# Patient Record
Sex: FEMALE | Race: BLACK OR AFRICAN AMERICAN | NOT HISPANIC OR LATINO | URBAN - METROPOLITAN AREA
[De-identification: names, ages, dates, MRNs, and addresses within clinical notes are randomized per-mention and may not be internally consistent; named-entity substitution may affect disease eponyms.]

---

## 2017-02-15 ENCOUNTER — EMERGENCY (EMERGENCY)
Facility: HOSPITAL | Age: 82
LOS: 0 days | Discharge: HOME | End: 2017-02-15
Attending: EMERGENCY MEDICINE
Payer: MEDICARE

## 2017-02-15 VITALS
OXYGEN SATURATION: 100 % | DIASTOLIC BLOOD PRESSURE: 74 MMHG | TEMPERATURE: 98 F | SYSTOLIC BLOOD PRESSURE: 120 MMHG | HEIGHT: 66 IN | HEART RATE: 53 BPM | WEIGHT: 126.1 LBS | RESPIRATION RATE: 16 BRPM

## 2017-02-15 VITALS
HEART RATE: 53 BPM | TEMPERATURE: 99 F | OXYGEN SATURATION: 96 % | SYSTOLIC BLOOD PRESSURE: 103 MMHG | RESPIRATION RATE: 17 BRPM | DIASTOLIC BLOOD PRESSURE: 60 MMHG

## 2017-02-15 DIAGNOSIS — M54.5 LOW BACK PAIN: ICD-10-CM

## 2017-02-15 DIAGNOSIS — Z79.82 LONG TERM (CURRENT) USE OF ASPIRIN: ICD-10-CM

## 2017-02-15 DIAGNOSIS — S70.01XA CONTUSION OF RIGHT HIP, INITIAL ENCOUNTER: ICD-10-CM

## 2017-02-15 DIAGNOSIS — G89.29 OTHER CHRONIC PAIN: ICD-10-CM
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
## 2017-02-15 DIAGNOSIS — Z96.659 PRESENCE OF UNSPECIFIED ARTIFICIAL KNEE JOINT: Chronic | ICD-10-CM

## 2017-02-15 DIAGNOSIS — Z95.0 PRESENCE OF CARDIAC PACEMAKER: Chronic | ICD-10-CM

## 2017-02-15 DIAGNOSIS — B02.9 ZOSTER WITHOUT COMPLICATIONS: ICD-10-CM

## 2017-02-15 DIAGNOSIS — E78.00 PURE HYPERCHOLESTEROLEMIA, UNSPECIFIED: ICD-10-CM

## 2017-02-15 DIAGNOSIS — M15.9 POLYOSTEOARTHRITIS, UNSPECIFIED: ICD-10-CM
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
## 2017-02-15 DIAGNOSIS — I10 ESSENTIAL (PRIMARY) HYPERTENSION: ICD-10-CM

## 2017-02-15 DIAGNOSIS — E03.9 HYPOTHYROIDISM, UNSPECIFIED: ICD-10-CM

## 2017-02-15 DIAGNOSIS — I48.91 UNSPECIFIED ATRIAL FIBRILLATION: ICD-10-CM

## 2017-02-15 LAB
ALBUMIN SERPL ELPH-MCNC: 3.7 G/DL — SIGNIFICANT CHANGE UP (ref 3.3–5)
ALP SERPL-CCNC: 65 U/L — SIGNIFICANT CHANGE UP (ref 40–120)
ALT FLD-CCNC: 25 U/L — SIGNIFICANT CHANGE UP (ref 12–78)
ANION GAP SERPL CALC-SCNC: 10 MMOL/L — SIGNIFICANT CHANGE UP (ref 5–17)
APTT BLD: 33.8 SEC — SIGNIFICANT CHANGE UP (ref 27.5–37.4)
AST SERPL-CCNC: 31 U/L — SIGNIFICANT CHANGE UP (ref 15–37)
BASOPHILS # BLD AUTO: 0 K/UL — SIGNIFICANT CHANGE UP (ref 0–0.2)
BASOPHILS NFR BLD AUTO: 0.3 % — SIGNIFICANT CHANGE UP (ref 0–2)
BILIRUB SERPL-MCNC: 2.1 MG/DL — HIGH (ref 0.2–1.2)
BUN SERPL-MCNC: 13 MG/DL — SIGNIFICANT CHANGE UP (ref 7–23)
CALCIUM SERPL-MCNC: 8.6 MG/DL — SIGNIFICANT CHANGE UP (ref 8.5–10.1)
CHLORIDE SERPL-SCNC: 105 MMOL/L — SIGNIFICANT CHANGE UP (ref 96–108)
CO2 SERPL-SCNC: 27 MMOL/L — SIGNIFICANT CHANGE UP (ref 22–31)
CREAT SERPL-MCNC: 0.56 MG/DL — SIGNIFICANT CHANGE UP (ref 0.5–1.3)
EOSINOPHIL # BLD AUTO: 0 K/UL — SIGNIFICANT CHANGE UP (ref 0–0.5)
EOSINOPHIL NFR BLD AUTO: 0.1 % — SIGNIFICANT CHANGE UP (ref 0–6)
GLUCOSE SERPL-MCNC: 85 MG/DL — SIGNIFICANT CHANGE UP (ref 70–99)
HCT VFR BLD CALC: 38.6 % — SIGNIFICANT CHANGE UP (ref 34.5–45)
HGB BLD-MCNC: 13.7 G/DL — SIGNIFICANT CHANGE UP (ref 11.5–15.5)
INR BLD: 1.13 RATIO — SIGNIFICANT CHANGE UP (ref 0.88–1.16)
LYMPHOCYTES # BLD AUTO: 0.8 K/UL — LOW (ref 1–3.3)
LYMPHOCYTES # BLD AUTO: 10 % — LOW (ref 13–44)
MCHC RBC-ENTMCNC: 33.4 PG — SIGNIFICANT CHANGE UP (ref 27–34)
MCHC RBC-ENTMCNC: 35.6 GM/DL — SIGNIFICANT CHANGE UP (ref 32–36)
MCV RBC AUTO: 94 FL — SIGNIFICANT CHANGE UP (ref 80–100)
MONOCYTES # BLD AUTO: 0.5 K/UL — SIGNIFICANT CHANGE UP (ref 0–0.9)
MONOCYTES NFR BLD AUTO: 5.8 % — SIGNIFICANT CHANGE UP (ref 2–14)
NEUTROPHILS # BLD AUTO: 6.8 K/UL — SIGNIFICANT CHANGE UP (ref 1.8–7.4)
NEUTROPHILS NFR BLD AUTO: 83.8 % — HIGH (ref 43–77)
PLATELET # BLD AUTO: 129 K/UL — LOW (ref 150–400)
POTASSIUM SERPL-MCNC: 3.7 MMOL/L — SIGNIFICANT CHANGE UP (ref 3.5–5.3)
POTASSIUM SERPL-SCNC: 3.7 MMOL/L — SIGNIFICANT CHANGE UP (ref 3.5–5.3)
PROT SERPL-MCNC: 7.2 GM/DL — SIGNIFICANT CHANGE UP (ref 6–8.3)
PROTHROM AB SERPL-ACNC: 12.7 SEC — SIGNIFICANT CHANGE UP (ref 10–13.1)
RBC # BLD: 4.11 M/UL — SIGNIFICANT CHANGE UP (ref 3.8–5.2)
RBC # FLD: 12.7 % — SIGNIFICANT CHANGE UP (ref 11–15)
SODIUM SERPL-SCNC: 142 MMOL/L — SIGNIFICANT CHANGE UP (ref 135–145)
WBC # BLD: 8.1 K/UL — SIGNIFICANT CHANGE UP (ref 3.8–10.5)
WBC # FLD AUTO: 8.1 K/UL — SIGNIFICANT CHANGE UP (ref 3.8–10.5)

## 2017-02-15 PROCEDURE — 73502 X-RAY EXAM HIP UNI 2-3 VIEWS: CPT | Mod: 26,RT

## 2017-02-15 PROCEDURE — 99284 EMERGENCY DEPT VISIT MOD MDM: CPT

## 2017-02-15 PROCEDURE — 72100 X-RAY EXAM L-S SPINE 2/3 VWS: CPT | Mod: 26

## 2017-02-15 RX ORDER — IBUPROFEN 200 MG
1 TABLET ORAL
Qty: 9 | Refills: 0 | OUTPATIENT
Start: 2017-02-15 | End: 2017-02-18

## 2017-02-15 NOTE — ED PROVIDER NOTE - PROGRESS NOTE DETAILS
pt is alert and oriented x 3 smiling, pt's daughter is notified about all test reports and sts she is coming to pick pt. Pt is able to bear weight with assists.

## 2017-02-15 NOTE — ED PROVIDER NOTE - CONSTITUTIONAL, MLM
normal... Well appearing, well nourished, awake, alert, oriented to person, place, time/situation and in no apparent distress. Speaking in clear full sentences appears very comfortable while sitting up in the stretcher in a bright light room

## 2017-02-15 NOTE — ED PROVIDER NOTE - OBJECTIVE STATEMENT
87 years old female by ems c/o right lower back pain right buttock pain 6/10 radiated to her right hip after she bend fore berg and left a pop. Pt sts she did not fall. Pt denies headache, dizziness, loc, neck pain, blurred visions, light sensitivities, focal/distal weakness or numbness, cough, sob, chest pain, nausea, vomiting, fever, chills, abd pain, dysuria or irregular bowel movements.

## 2017-02-15 NOTE — ED PROVIDER NOTE - PMH
Atrial fibrillation    Chronic Back Pain    Generalized Osteoarthritis    Herpes zoster without complication  2015  HTN (hypertension)    Hypercholesteremia    Hypothyroidism    Lumbar Vertebral Fracture

## 2017-03-06 ENCOUNTER — APPOINTMENT (OUTPATIENT)
Dept: ELECTROPHYSIOLOGY | Facility: CLINIC | Age: 82
End: 2017-03-06

## 2017-05-16 ENCOUNTER — EMERGENCY (EMERGENCY)
Facility: HOSPITAL | Age: 82
LOS: 0 days | Discharge: HOME | End: 2017-05-16
Attending: EMERGENCY MEDICINE
Payer: MEDICARE

## 2017-05-16 VITALS
HEART RATE: 60 BPM | RESPIRATION RATE: 18 BRPM | OXYGEN SATURATION: 98 % | SYSTOLIC BLOOD PRESSURE: 116 MMHG | TEMPERATURE: 98 F | DIASTOLIC BLOOD PRESSURE: 62 MMHG

## 2017-05-16 VITALS
HEART RATE: 54 BPM | WEIGHT: 130.07 LBS | OXYGEN SATURATION: 96 % | SYSTOLIC BLOOD PRESSURE: 100 MMHG | TEMPERATURE: 98 F | HEIGHT: 66 IN | DIASTOLIC BLOOD PRESSURE: 56 MMHG | RESPIRATION RATE: 17 BRPM

## 2017-05-16 DIAGNOSIS — Z95.0 PRESENCE OF CARDIAC PACEMAKER: Chronic | ICD-10-CM

## 2017-05-16 DIAGNOSIS — Z96.659 PRESENCE OF UNSPECIFIED ARTIFICIAL KNEE JOINT: Chronic | ICD-10-CM

## 2017-05-16 LAB
ALBUMIN SERPL ELPH-MCNC: 3.7 G/DL — SIGNIFICANT CHANGE UP (ref 3.3–5)
ALP SERPL-CCNC: 68 U/L — SIGNIFICANT CHANGE UP (ref 40–120)
ALT FLD-CCNC: 21 U/L — SIGNIFICANT CHANGE UP (ref 12–78)
ANION GAP SERPL CALC-SCNC: 11 MMOL/L — SIGNIFICANT CHANGE UP (ref 5–17)
APPEARANCE UR: CLEAR — SIGNIFICANT CHANGE UP
AST SERPL-CCNC: 27 U/L — SIGNIFICANT CHANGE UP (ref 15–37)
BACTERIA # UR AUTO: ABNORMAL
BASOPHILS # BLD AUTO: 0 K/UL — SIGNIFICANT CHANGE UP (ref 0–0.2)
BASOPHILS NFR BLD AUTO: 0.8 % — SIGNIFICANT CHANGE UP (ref 0–2)
BILIRUB SERPL-MCNC: 1.6 MG/DL — HIGH (ref 0.2–1.2)
BILIRUB UR-MCNC: NEGATIVE — SIGNIFICANT CHANGE UP
BUN SERPL-MCNC: 11 MG/DL — SIGNIFICANT CHANGE UP (ref 7–23)
CALCIUM SERPL-MCNC: 9.2 MG/DL — SIGNIFICANT CHANGE UP (ref 8.5–10.1)
CHLORIDE SERPL-SCNC: 101 MMOL/L — SIGNIFICANT CHANGE UP (ref 96–108)
CO2 SERPL-SCNC: 27 MMOL/L — SIGNIFICANT CHANGE UP (ref 22–31)
COLOR SPEC: YELLOW — SIGNIFICANT CHANGE UP
CREAT SERPL-MCNC: 0.69 MG/DL — SIGNIFICANT CHANGE UP (ref 0.5–1.3)
DIFF PNL FLD: NEGATIVE — SIGNIFICANT CHANGE UP
EOSINOPHIL # BLD AUTO: 0 K/UL — SIGNIFICANT CHANGE UP (ref 0–0.5)
EOSINOPHIL NFR BLD AUTO: 0.1 % — SIGNIFICANT CHANGE UP (ref 0–6)
EPI CELLS # UR: SIGNIFICANT CHANGE UP
GLUCOSE SERPL-MCNC: 91 MG/DL — SIGNIFICANT CHANGE UP (ref 70–99)
GLUCOSE UR QL: NEGATIVE MG/DL — SIGNIFICANT CHANGE UP
HCT VFR BLD CALC: 44.9 % — SIGNIFICANT CHANGE UP (ref 34.5–45)
HGB BLD-MCNC: 15 G/DL — SIGNIFICANT CHANGE UP (ref 11.5–15.5)
KETONES UR-MCNC: NEGATIVE — SIGNIFICANT CHANGE UP
LACTATE SERPL-SCNC: 1.5 MMOL/L — SIGNIFICANT CHANGE UP (ref 0.7–2)
LEUKOCYTE ESTERASE UR-ACNC: NEGATIVE — SIGNIFICANT CHANGE UP
LYMPHOCYTES # BLD AUTO: 0.8 K/UL — LOW (ref 1–3.3)
LYMPHOCYTES # BLD AUTO: 13.9 % — SIGNIFICANT CHANGE UP (ref 13–44)
MCHC RBC-ENTMCNC: 32.3 PG — SIGNIFICANT CHANGE UP (ref 27–34)
MCHC RBC-ENTMCNC: 33.5 GM/DL — SIGNIFICANT CHANGE UP (ref 32–36)
MCV RBC AUTO: 96.3 FL — SIGNIFICANT CHANGE UP (ref 80–100)
MONOCYTES # BLD AUTO: 0.3 K/UL — SIGNIFICANT CHANGE UP (ref 0–0.9)
MONOCYTES NFR BLD AUTO: 5.2 % — SIGNIFICANT CHANGE UP (ref 2–14)
NEUTROPHILS # BLD AUTO: 4.8 K/UL — SIGNIFICANT CHANGE UP (ref 1.8–7.4)
NEUTROPHILS NFR BLD AUTO: 80.1 % — HIGH (ref 43–77)
NITRITE UR-MCNC: NEGATIVE — SIGNIFICANT CHANGE UP
PH UR: 6.5 — SIGNIFICANT CHANGE UP (ref 5–8)
PLATELET # BLD AUTO: 127 K/UL — LOW (ref 150–400)
POTASSIUM SERPL-MCNC: 4.5 MMOL/L — SIGNIFICANT CHANGE UP (ref 3.5–5.3)
POTASSIUM SERPL-SCNC: 4.5 MMOL/L — SIGNIFICANT CHANGE UP (ref 3.5–5.3)
PROT SERPL-MCNC: 7.7 GM/DL — SIGNIFICANT CHANGE UP (ref 6–8.3)
PROT UR-MCNC: 15 MG/DL
RBC # BLD: 4.66 M/UL — SIGNIFICANT CHANGE UP (ref 3.8–5.2)
RBC # FLD: 12.9 % — SIGNIFICANT CHANGE UP (ref 11–15)
RBC CASTS # UR COMP ASSIST: SIGNIFICANT CHANGE UP /HPF (ref 0–4)
SODIUM SERPL-SCNC: 139 MMOL/L — SIGNIFICANT CHANGE UP (ref 135–145)
SP GR SPEC: 1.01 — SIGNIFICANT CHANGE UP (ref 1.01–1.02)
UROBILINOGEN FLD QL: NEGATIVE MG/DL — SIGNIFICANT CHANGE UP
WBC # BLD: 6 K/UL — SIGNIFICANT CHANGE UP (ref 3.8–10.5)
WBC # FLD AUTO: 6 K/UL — SIGNIFICANT CHANGE UP (ref 3.8–10.5)
WBC UR QL: SIGNIFICANT CHANGE UP

## 2017-05-16 PROCEDURE — 99284 EMERGENCY DEPT VISIT MOD MDM: CPT

## 2017-05-16 PROCEDURE — 74176 CT ABD & PELVIS W/O CONTRAST: CPT | Mod: 26

## 2017-05-16 RX ORDER — ACETAMINOPHEN 500 MG
2 TABLET ORAL
Qty: 40 | Refills: 0 | OUTPATIENT
Start: 2017-05-16 | End: 2017-05-21

## 2017-05-16 RX ORDER — ONDANSETRON 8 MG/1
4 TABLET, FILM COATED ORAL ONCE
Qty: 0 | Refills: 0 | Status: COMPLETED | OUTPATIENT
Start: 2017-05-16 | End: 2017-05-16

## 2017-05-16 RX ORDER — MORPHINE SULFATE 50 MG/1
2 CAPSULE, EXTENDED RELEASE ORAL ONCE
Qty: 0 | Refills: 0 | Status: DISCONTINUED | OUTPATIENT
Start: 2017-05-16 | End: 2017-05-16

## 2017-05-16 RX ORDER — TRAMADOL HYDROCHLORIDE 50 MG/1
1 TABLET ORAL
Qty: 12 | Refills: 0 | OUTPATIENT
Start: 2017-05-16 | End: 2017-05-19

## 2017-05-16 RX ORDER — METHOCARBAMOL 500 MG/1
1 TABLET, FILM COATED ORAL
Qty: 15 | Refills: 0 | OUTPATIENT
Start: 2017-05-16 | End: 2017-05-21

## 2017-05-16 RX ADMIN — MORPHINE SULFATE 2 MILLIGRAM(S): 50 CAPSULE, EXTENDED RELEASE ORAL at 18:06

## 2017-05-16 RX ADMIN — ONDANSETRON 4 MILLIGRAM(S): 8 TABLET, FILM COATED ORAL at 18:06

## 2017-05-16 RX ADMIN — MORPHINE SULFATE 2 MILLIGRAM(S): 50 CAPSULE, EXTENDED RELEASE ORAL at 21:29

## 2017-05-16 NOTE — ED PROVIDER NOTE - MEDICAL DECISION MAKING DETAILS
pt with left flank pain without noted kidney stone, likely pain on left flank related to chronic back pain to DC with follow up with PMD in 2-3 days.

## 2017-05-16 NOTE — ED PROVIDER NOTE - OBJECTIVE STATEMENT
87 year old female with PMH of Hypothyroid, HTN, afib, frequent UTIs presenting to ED due to left sided flank pain since Sunday - 2 days ago, states has had UTI in past but this pain is worse than usual UTI pain. Denies any fever/chills, pain is on and off

## 2017-05-16 NOTE — ED PROVIDER NOTE - PSH
H/O cardiac pacemaker  [employer] placed 12/10  S/P Colonoscopic Polypectomy  15 years ago per pt  S/P TKR (total knee replacement)  Right TKR in 5/16

## 2017-05-16 NOTE — ED ADULT NURSE NOTE - OBJECTIVE STATEMENT
Pt c/o left flank pain for a few days with nausea. Pt c/o frequent UTI's, states pain being experience is different

## 2017-05-17 DIAGNOSIS — Z79.1 LONG TERM (CURRENT) USE OF NON-STEROIDAL ANTI-INFLAMMATORIES (NSAID): ICD-10-CM

## 2017-05-17 DIAGNOSIS — E78.00 PURE HYPERCHOLESTEROLEMIA, UNSPECIFIED: ICD-10-CM

## 2017-05-17 DIAGNOSIS — M19.90 UNSPECIFIED OSTEOARTHRITIS, UNSPECIFIED SITE: ICD-10-CM

## 2017-05-17 DIAGNOSIS — Z88.8 ALLERGY STATUS TO OTHER DRUGS, MEDICAMENTS AND BIOLOGICAL SUBSTANCES STATUS: ICD-10-CM

## 2017-05-17 DIAGNOSIS — S39.011A STRAIN OF MUSCLE, FASCIA AND TENDON OF ABDOMEN, INITIAL ENCOUNTER: ICD-10-CM

## 2017-05-17 DIAGNOSIS — E03.9 HYPOTHYROIDISM, UNSPECIFIED: ICD-10-CM

## 2017-05-17 DIAGNOSIS — B02.9 ZOSTER WITHOUT COMPLICATIONS: ICD-10-CM

## 2017-05-17 DIAGNOSIS — R10.9 UNSPECIFIED ABDOMINAL PAIN: ICD-10-CM

## 2017-05-17 DIAGNOSIS — I48.91 UNSPECIFIED ATRIAL FIBRILLATION: ICD-10-CM

## 2017-05-17 DIAGNOSIS — I10 ESSENTIAL (PRIMARY) HYPERTENSION: ICD-10-CM

## 2017-05-17 DIAGNOSIS — Z79.82 LONG TERM (CURRENT) USE OF ASPIRIN: ICD-10-CM

## 2017-05-17 DIAGNOSIS — M54.9 DORSALGIA, UNSPECIFIED: ICD-10-CM

## 2017-05-17 LAB
CULTURE RESULTS: NO GROWTH — SIGNIFICANT CHANGE UP
SPECIMEN SOURCE: SIGNIFICANT CHANGE UP

## 2017-05-20 ENCOUNTER — EMERGENCY (EMERGENCY)
Facility: HOSPITAL | Age: 82
LOS: 0 days | Discharge: HOME | End: 2017-05-20
Attending: EMERGENCY MEDICINE
Payer: MEDICARE

## 2017-05-20 VITALS
SYSTOLIC BLOOD PRESSURE: 110 MMHG | DIASTOLIC BLOOD PRESSURE: 73 MMHG | RESPIRATION RATE: 16 BRPM | HEART RATE: 52 BPM | WEIGHT: 130.07 LBS | HEIGHT: 65 IN | OXYGEN SATURATION: 98 %

## 2017-05-20 DIAGNOSIS — I48.91 UNSPECIFIED ATRIAL FIBRILLATION: ICD-10-CM

## 2017-05-20 DIAGNOSIS — Z79.82 LONG TERM (CURRENT) USE OF ASPIRIN: ICD-10-CM

## 2017-05-20 DIAGNOSIS — S32.000D WEDGE COMPRESSION FRACTURE OF UNSPECIFIED LUMBAR VERTEBRA, SUBSEQUENT ENCOUNTER FOR FRACTURE WITH ROUTINE HEALING: ICD-10-CM

## 2017-05-20 DIAGNOSIS — Z95.0 PRESENCE OF CARDIAC PACEMAKER: ICD-10-CM

## 2017-05-20 DIAGNOSIS — E03.9 HYPOTHYROIDISM, UNSPECIFIED: ICD-10-CM

## 2017-05-20 DIAGNOSIS — M15.9 POLYOSTEOARTHRITIS, UNSPECIFIED: ICD-10-CM

## 2017-05-20 DIAGNOSIS — Z95.0 PRESENCE OF CARDIAC PACEMAKER: Chronic | ICD-10-CM

## 2017-05-20 DIAGNOSIS — M54.5 LOW BACK PAIN: ICD-10-CM

## 2017-05-20 DIAGNOSIS — Z96.659 PRESENCE OF UNSPECIFIED ARTIFICIAL KNEE JOINT: Chronic | ICD-10-CM

## 2017-05-20 DIAGNOSIS — Z96.651 PRESENCE OF RIGHT ARTIFICIAL KNEE JOINT: ICD-10-CM

## 2017-05-20 DIAGNOSIS — B02.9 ZOSTER WITHOUT COMPLICATIONS: ICD-10-CM

## 2017-05-20 DIAGNOSIS — Z79.1 LONG TERM (CURRENT) USE OF NON-STEROIDAL ANTI-INFLAMMATORIES (NSAID): ICD-10-CM

## 2017-05-20 LAB
ALBUMIN SERPL ELPH-MCNC: 3.6 G/DL — SIGNIFICANT CHANGE UP (ref 3.3–5)
ALP SERPL-CCNC: 61 U/L — SIGNIFICANT CHANGE UP (ref 40–120)
ALT FLD-CCNC: 16 U/L — SIGNIFICANT CHANGE UP (ref 12–78)
ANION GAP SERPL CALC-SCNC: 6 MMOL/L — SIGNIFICANT CHANGE UP (ref 5–17)
APPEARANCE UR: CLEAR — SIGNIFICANT CHANGE UP
AST SERPL-CCNC: 23 U/L — SIGNIFICANT CHANGE UP (ref 15–37)
BACTERIA # UR AUTO: ABNORMAL
BASOPHILS # BLD AUTO: 0.1 K/UL — SIGNIFICANT CHANGE UP (ref 0–0.2)
BASOPHILS NFR BLD AUTO: 0.9 % — SIGNIFICANT CHANGE UP (ref 0–2)
BILIRUB SERPL-MCNC: 1.4 MG/DL — HIGH (ref 0.2–1.2)
BILIRUB UR-MCNC: ABNORMAL
BUN SERPL-MCNC: 17 MG/DL — SIGNIFICANT CHANGE UP (ref 7–23)
CALCIUM SERPL-MCNC: 8.7 MG/DL — SIGNIFICANT CHANGE UP (ref 8.5–10.1)
CHLORIDE SERPL-SCNC: 103 MMOL/L — SIGNIFICANT CHANGE UP (ref 96–108)
CO2 SERPL-SCNC: 31 MMOL/L — SIGNIFICANT CHANGE UP (ref 22–31)
COLOR SPEC: YELLOW — SIGNIFICANT CHANGE UP
CREAT SERPL-MCNC: 0.72 MG/DL — SIGNIFICANT CHANGE UP (ref 0.5–1.3)
CRP SERPL-MCNC: <0.2 MG/DL — SIGNIFICANT CHANGE UP (ref 0–0.4)
DIFF PNL FLD: ABNORMAL
EOSINOPHIL # BLD AUTO: 0 K/UL — SIGNIFICANT CHANGE UP (ref 0–0.5)
EOSINOPHIL NFR BLD AUTO: 0.1 % — SIGNIFICANT CHANGE UP (ref 0–6)
EPI CELLS # UR: SIGNIFICANT CHANGE UP
ERYTHROCYTE [SEDIMENTATION RATE] IN BLOOD: 10 MM/HR — SIGNIFICANT CHANGE UP (ref 0–20)
GLUCOSE SERPL-MCNC: 112 MG/DL — HIGH (ref 70–99)
GLUCOSE UR QL: NEGATIVE MG/DL — SIGNIFICANT CHANGE UP
HCT VFR BLD CALC: 40.5 % — SIGNIFICANT CHANGE UP (ref 34.5–45)
HGB BLD-MCNC: 13.8 G/DL — SIGNIFICANT CHANGE UP (ref 11.5–15.5)
KETONES UR-MCNC: ABNORMAL
LEUKOCYTE ESTERASE UR-ACNC: ABNORMAL
LYMPHOCYTES # BLD AUTO: 0.8 K/UL — LOW (ref 1–3.3)
LYMPHOCYTES # BLD AUTO: 12.7 % — LOW (ref 13–44)
MCHC RBC-ENTMCNC: 32.7 PG — SIGNIFICANT CHANGE UP (ref 27–34)
MCHC RBC-ENTMCNC: 34 GM/DL — SIGNIFICANT CHANGE UP (ref 32–36)
MCV RBC AUTO: 96.2 FL — SIGNIFICANT CHANGE UP (ref 80–100)
MONOCYTES # BLD AUTO: 0.3 K/UL — SIGNIFICANT CHANGE UP (ref 0–0.9)
MONOCYTES NFR BLD AUTO: 5.2 % — SIGNIFICANT CHANGE UP (ref 2–14)
NEUTROPHILS # BLD AUTO: 4.9 K/UL — SIGNIFICANT CHANGE UP (ref 1.8–7.4)
NEUTROPHILS NFR BLD AUTO: 81.1 % — HIGH (ref 43–77)
NITRITE UR-MCNC: NEGATIVE — SIGNIFICANT CHANGE UP
PH UR: 6.5 — SIGNIFICANT CHANGE UP (ref 5–8)
PLATELET # BLD AUTO: 151 K/UL — SIGNIFICANT CHANGE UP (ref 150–400)
POTASSIUM SERPL-MCNC: 3.8 MMOL/L — SIGNIFICANT CHANGE UP (ref 3.5–5.3)
POTASSIUM SERPL-SCNC: 3.8 MMOL/L — SIGNIFICANT CHANGE UP (ref 3.5–5.3)
PROT SERPL-MCNC: 7.7 GM/DL — SIGNIFICANT CHANGE UP (ref 6–8.3)
PROT UR-MCNC: 100 MG/DL
RBC # BLD: 4.21 M/UL — SIGNIFICANT CHANGE UP (ref 3.8–5.2)
RBC # FLD: 12.9 % — SIGNIFICANT CHANGE UP (ref 11–15)
RBC CASTS # UR COMP ASSIST: ABNORMAL /HPF (ref 0–4)
SODIUM SERPL-SCNC: 140 MMOL/L — SIGNIFICANT CHANGE UP (ref 135–145)
SP GR SPEC: 1.01 — SIGNIFICANT CHANGE UP (ref 1.01–1.02)
UROBILINOGEN FLD QL: 4 MG/DL
WBC # BLD: 6.1 K/UL — SIGNIFICANT CHANGE UP (ref 3.8–10.5)
WBC # FLD AUTO: 6.1 K/UL — SIGNIFICANT CHANGE UP (ref 3.8–10.5)
WBC UR QL: ABNORMAL

## 2017-05-20 PROCEDURE — 99284 EMERGENCY DEPT VISIT MOD MDM: CPT

## 2017-05-20 PROCEDURE — 72110 X-RAY EXAM L-2 SPINE 4/>VWS: CPT | Mod: 26

## 2017-05-20 RX ORDER — DOCUSATE SODIUM 100 MG
1 CAPSULE ORAL
Qty: 3 | Refills: 0 | OUTPATIENT
Start: 2017-05-20 | End: 2017-05-23

## 2017-05-20 RX ORDER — OXYCODONE HYDROCHLORIDE 5 MG/1
1 TABLET ORAL
Qty: 12 | Refills: 0 | OUTPATIENT
Start: 2017-05-20 | End: 2017-05-23

## 2017-05-20 NOTE — ED PROVIDER NOTE - CARE PLAN
Principal Discharge DX:	Midline low back pain without sciatica, unspecified chronicity  Secondary Diagnosis:	Compression fracture of lumbosacral spine, with routine healing, subsequent encounter

## 2017-05-20 NOTE — ED PROVIDER NOTE - OBJECTIVE STATEMENT
86yo female with back pain x 1 week without recent trauma or falls; no urinary or abd complaints; no numbness/weakness/tingling to ext.  Pt states that she was seen at [facility] 1 week ago for similar symptoms with unknown etiology.  Pt did have a CT at that time with L5 endplate fx. Pt reports knowledge of vertebral fx x 25 years but has never had pain due to the fx.  Pt denies incontinence; no numbness/weakness/tingling to ext; no increased difficulty in ambulating with cane from baseline.  Pt states that she took her Tramadol and states pain is improved

## 2017-05-20 NOTE — ED PROVIDER NOTE - PSH
H/O cardiac pacemaker  [employer] placed 12/10  S/P Colonoscopic Polypectomy  15 years ago per pt  S/P TKR (total knee replacement)  Right TKR in 5/16

## 2017-05-20 NOTE — ED PROVIDER NOTE - CHPI ED SYMPTOMS NEG
no constipation/no anorexia/no motor function loss/no neck tenderness/no numbness/no bladder dysfunction/no tingling/no fatigue/no difficulty bearing weight/no bowel dysfunction

## 2017-05-20 NOTE — ED PROVIDER NOTE - PROGRESS NOTE DETAILS
Pt resting comfortably without complaints of worsening pain.  Pt on prior evaluation states she tooke her home pain meds and was improving pt c/o back pain and inability to get off of the stretcher without pain; Percocet 5/325mg given Pt reported improvement with pain meds; is able to get out of the bed without assistance.  Rx sent for pain meds.  Will discharge Pt resting comfortably without complaints of worsening pain.  Pt on prior evaluation states she took her home pain meds and was improving

## 2017-05-20 NOTE — ED PROVIDER NOTE - MEDICAL DECISION MAKING DETAILS
88yo female with frequent evaluations for back pain with multiple lumbar end plate and compression fractures with pain to the back toady; no acute findings on xray.  Pt had labs and CT 1 week ago on evaluations for similar symptoms

## 2017-06-15 ENCOUNTER — APPOINTMENT (OUTPATIENT)
Dept: ELECTROPHYSIOLOGY | Facility: CLINIC | Age: 82
End: 2017-06-15

## 2017-09-18 ENCOUNTER — APPOINTMENT (OUTPATIENT)
Dept: ELECTROPHYSIOLOGY | Facility: CLINIC | Age: 82
End: 2017-09-18
Payer: MEDICARE

## 2017-09-18 VITALS — DIASTOLIC BLOOD PRESSURE: 81 MMHG | SYSTOLIC BLOOD PRESSURE: 129 MMHG | HEART RATE: 60 BPM

## 2017-09-18 PROCEDURE — 93283 PRGRMG EVAL IMPLANTABLE DFB: CPT

## 2017-11-27 ENCOUNTER — OUTPATIENT (OUTPATIENT)
Dept: OUTPATIENT SERVICES | Facility: HOSPITAL | Age: 82
LOS: 1 days | Discharge: HOME | End: 2017-11-27

## 2017-11-27 DIAGNOSIS — E83.111 HEMOCHROMATOSIS DUE TO REPEATED RED BLOOD CELL TRANSFUSIONS: ICD-10-CM

## 2017-11-27 DIAGNOSIS — Z95.0 PRESENCE OF CARDIAC PACEMAKER: Chronic | ICD-10-CM

## 2017-11-27 DIAGNOSIS — Z96.659 PRESENCE OF UNSPECIFIED ARTIFICIAL KNEE JOINT: Chronic | ICD-10-CM

## 2017-12-01 ENCOUNTER — RESULT REVIEW (OUTPATIENT)
Age: 82
End: 2017-12-01

## 2017-12-01 ENCOUNTER — APPOINTMENT (OUTPATIENT)
Age: 82
End: 2017-12-01
Payer: MEDICARE

## 2017-12-01 VITALS
OXYGEN SATURATION: 98 % | TEMPERATURE: 98.1 F | DIASTOLIC BLOOD PRESSURE: 72 MMHG | HEART RATE: 115 BPM | RESPIRATION RATE: 17 BRPM | SYSTOLIC BLOOD PRESSURE: 101 MMHG | BODY MASS INDEX: 19.76 KG/M2 | WEIGHT: 118.61 LBS | HEIGHT: 64.96 IN

## 2017-12-01 DIAGNOSIS — D69.6 THROMBOCYTOPENIA, UNSPECIFIED: ICD-10-CM

## 2017-12-01 DIAGNOSIS — K59.00 CONSTIPATION, UNSPECIFIED: ICD-10-CM

## 2017-12-01 DIAGNOSIS — D75.89 OTHER SPECIFIED DISEASES OF BLOOD AND BLOOD-FORMING ORGANS: ICD-10-CM

## 2017-12-01 DIAGNOSIS — E83.19 OTHER DISORDERS OF IRON METABOLISM: ICD-10-CM

## 2017-12-01 DIAGNOSIS — Z63.4 DISAPPEARANCE AND DEATH OF FAMILY MEMBER: ICD-10-CM

## 2017-12-01 DIAGNOSIS — R14.0 ABDOMINAL DISTENSION (GASEOUS): ICD-10-CM

## 2017-12-01 LAB
BASOPHILS # BLD AUTO: 0 K/UL — SIGNIFICANT CHANGE UP (ref 0–0.2)
BASOPHILS NFR BLD AUTO: 0.4 % — SIGNIFICANT CHANGE UP (ref 0–2)
EOSINOPHIL # BLD AUTO: 0 K/UL — SIGNIFICANT CHANGE UP (ref 0–0.5)
EOSINOPHIL NFR BLD AUTO: 0.4 % — SIGNIFICANT CHANGE UP (ref 0–6)
HCT VFR BLD CALC: 41.6 % — SIGNIFICANT CHANGE UP (ref 34.5–45)
HGB BLD-MCNC: 14 G/DL — SIGNIFICANT CHANGE UP (ref 11.5–15.5)
LYMPHOCYTES # BLD AUTO: 1.1 K/UL — SIGNIFICANT CHANGE UP (ref 1–3.3)
LYMPHOCYTES # BLD AUTO: 22.2 % — SIGNIFICANT CHANGE UP (ref 13–44)
MCHC RBC-ENTMCNC: 33.6 G/DL — SIGNIFICANT CHANGE UP (ref 32–36)
MCHC RBC-ENTMCNC: 34.3 PG — HIGH (ref 27–34)
MCV RBC AUTO: 102 FL — HIGH (ref 80–100)
MONOCYTES # BLD AUTO: 0.3 K/UL — SIGNIFICANT CHANGE UP (ref 0–0.9)
MONOCYTES NFR BLD AUTO: 6.3 % — SIGNIFICANT CHANGE UP (ref 2–14)
NEUTROPHILS # BLD AUTO: 3.5 K/UL — SIGNIFICANT CHANGE UP (ref 1.8–7.4)
NEUTROPHILS NFR BLD AUTO: 70.8 % — SIGNIFICANT CHANGE UP (ref 43–77)
PLATELET # BLD AUTO: 96 K/UL — LOW (ref 150–400)
RBC # BLD: 4.07 M/UL — SIGNIFICANT CHANGE UP (ref 3.8–5.2)
RBC # FLD: 13 % — SIGNIFICANT CHANGE UP (ref 10.3–14.5)
WBC # BLD: 5 K/UL — SIGNIFICANT CHANGE UP (ref 3.8–10.5)
WBC # FLD AUTO: 5 K/UL — SIGNIFICANT CHANGE UP (ref 3.8–10.5)

## 2017-12-01 PROCEDURE — 99205 OFFICE O/P NEW HI 60 MIN: CPT

## 2017-12-01 SDOH — SOCIAL STABILITY - SOCIAL INSECURITY: DISSAPEARANCE AND DEATH OF FAMILY MEMBER: Z63.4

## 2017-12-03 LAB
FERRITIN SERPL-MCNC: 24 NG/ML
IRON SATN MFR SERPL: 36 %
IRON SERPL-MCNC: 109 UG/DL
TIBC SERPL-MCNC: 301 UG/DL
TRANSFERRIN SERPL-MCNC: 248 MG/DL
UIBC SERPL-MCNC: 192 UG/DL

## 2017-12-04 DIAGNOSIS — E83.19 OTHER DISORDERS OF IRON METABOLISM: ICD-10-CM

## 2017-12-12 ENCOUNTER — EMERGENCY (EMERGENCY)
Facility: HOSPITAL | Age: 82
LOS: 0 days | Discharge: HOME | End: 2017-12-12
Attending: EMERGENCY MEDICINE
Payer: MEDICARE

## 2017-12-12 ENCOUNTER — INPATIENT (INPATIENT)
Facility: HOSPITAL | Age: 82
LOS: 2 days | Discharge: HOME | End: 2017-12-15
Attending: INTERNAL MEDICINE | Admitting: INTERNAL MEDICINE
Payer: MEDICARE

## 2017-12-12 VITALS
HEIGHT: 65 IN | DIASTOLIC BLOOD PRESSURE: 67 MMHG | SYSTOLIC BLOOD PRESSURE: 155 MMHG | HEART RATE: 60 BPM | WEIGHT: 115.96 LBS | TEMPERATURE: 98 F | RESPIRATION RATE: 21 BRPM | OXYGEN SATURATION: 95 %

## 2017-12-12 VITALS
DIASTOLIC BLOOD PRESSURE: 68 MMHG | TEMPERATURE: 99 F | HEART RATE: 60 BPM | OXYGEN SATURATION: 98 % | SYSTOLIC BLOOD PRESSURE: 103 MMHG | RESPIRATION RATE: 18 BRPM

## 2017-12-12 VITALS
SYSTOLIC BLOOD PRESSURE: 124 MMHG | TEMPERATURE: 98 F | DIASTOLIC BLOOD PRESSURE: 80 MMHG | HEART RATE: 63 BPM | HEIGHT: 65 IN | OXYGEN SATURATION: 97 % | WEIGHT: 115.96 LBS | RESPIRATION RATE: 15 BRPM

## 2017-12-12 DIAGNOSIS — Z96.659 PRESENCE OF UNSPECIFIED ARTIFICIAL KNEE JOINT: Chronic | ICD-10-CM

## 2017-12-12 DIAGNOSIS — E03.9 HYPOTHYROIDISM, UNSPECIFIED: ICD-10-CM

## 2017-12-12 DIAGNOSIS — E78.00 PURE HYPERCHOLESTEROLEMIA, UNSPECIFIED: ICD-10-CM

## 2017-12-12 DIAGNOSIS — M15.9 POLYOSTEOARTHRITIS, UNSPECIFIED: ICD-10-CM

## 2017-12-12 DIAGNOSIS — Z96.651 PRESENCE OF RIGHT ARTIFICIAL KNEE JOINT: ICD-10-CM

## 2017-12-12 DIAGNOSIS — R05 COUGH: ICD-10-CM

## 2017-12-12 DIAGNOSIS — I48.91 UNSPECIFIED ATRIAL FIBRILLATION: ICD-10-CM

## 2017-12-12 DIAGNOSIS — R10.9 UNSPECIFIED ABDOMINAL PAIN: ICD-10-CM

## 2017-12-12 DIAGNOSIS — M54.9 DORSALGIA, UNSPECIFIED: ICD-10-CM

## 2017-12-12 DIAGNOSIS — S32.008S: ICD-10-CM

## 2017-12-12 DIAGNOSIS — Z95.0 PRESENCE OF CARDIAC PACEMAKER: Chronic | ICD-10-CM

## 2017-12-12 DIAGNOSIS — I10 ESSENTIAL (PRIMARY) HYPERTENSION: ICD-10-CM

## 2017-12-12 DIAGNOSIS — Z79.82 LONG TERM (CURRENT) USE OF ASPIRIN: ICD-10-CM

## 2017-12-12 DIAGNOSIS — Z95.0 PRESENCE OF CARDIAC PACEMAKER: ICD-10-CM

## 2017-12-12 DIAGNOSIS — Z79.1 LONG TERM (CURRENT) USE OF NON-STEROIDAL ANTI-INFLAMMATORIES (NSAID): ICD-10-CM

## 2017-12-12 DIAGNOSIS — G89.29 OTHER CHRONIC PAIN: ICD-10-CM

## 2017-12-12 DIAGNOSIS — B02.9 ZOSTER WITHOUT COMPLICATIONS: ICD-10-CM

## 2017-12-12 LAB
ALBUMIN SERPL ELPH-MCNC: 3.3 G/DL — SIGNIFICANT CHANGE UP (ref 3.3–5)
ALP SERPL-CCNC: 47 U/L — SIGNIFICANT CHANGE UP (ref 40–120)
ALT FLD-CCNC: 20 U/L — SIGNIFICANT CHANGE UP (ref 12–78)
ANION GAP SERPL CALC-SCNC: 10 MMOL/L — SIGNIFICANT CHANGE UP (ref 5–17)
APPEARANCE UR: CLEAR — SIGNIFICANT CHANGE UP
APTT BLD: 34 SEC — SIGNIFICANT CHANGE UP (ref 27.5–37.4)
AST SERPL-CCNC: 28 U/L — SIGNIFICANT CHANGE UP (ref 15–37)
BASOPHILS # BLD AUTO: 0.1 K/UL — SIGNIFICANT CHANGE UP (ref 0–0.2)
BASOPHILS NFR BLD AUTO: 1 % — SIGNIFICANT CHANGE UP (ref 0–2)
BILIRUB SERPL-MCNC: 1.5 MG/DL — HIGH (ref 0.2–1.2)
BILIRUB UR-MCNC: NEGATIVE — SIGNIFICANT CHANGE UP
BUN SERPL-MCNC: 11 MG/DL — SIGNIFICANT CHANGE UP (ref 7–23)
CALCIUM SERPL-MCNC: 8.6 MG/DL — SIGNIFICANT CHANGE UP (ref 8.5–10.1)
CHLORIDE SERPL-SCNC: 103 MMOL/L — SIGNIFICANT CHANGE UP (ref 96–108)
CK MB BLD-MCNC: 1.6 % — SIGNIFICANT CHANGE UP (ref 0–3.5)
CK MB BLD-MCNC: 2 % — SIGNIFICANT CHANGE UP (ref 0–3.5)
CK MB CFR SERPL CALC: 1.5 NG/ML — SIGNIFICANT CHANGE UP (ref 0.5–3.6)
CK MB CFR SERPL CALC: 1.6 NG/ML — SIGNIFICANT CHANGE UP (ref 0.5–3.6)
CK SERPL-CCNC: 79 U/L — SIGNIFICANT CHANGE UP (ref 26–192)
CK SERPL-CCNC: 91 U/L — SIGNIFICANT CHANGE UP (ref 26–192)
CO2 SERPL-SCNC: 27 MMOL/L — SIGNIFICANT CHANGE UP (ref 22–31)
COLOR SPEC: YELLOW — SIGNIFICANT CHANGE UP
CREAT SERPL-MCNC: 0.66 MG/DL — SIGNIFICANT CHANGE UP (ref 0.5–1.3)
DIFF PNL FLD: NEGATIVE — SIGNIFICANT CHANGE UP
EOSINOPHIL # BLD AUTO: 0 K/UL — SIGNIFICANT CHANGE UP (ref 0–0.5)
EOSINOPHIL NFR BLD AUTO: 0.1 % — SIGNIFICANT CHANGE UP (ref 0–6)
GLUCOSE SERPL-MCNC: 97 MG/DL — SIGNIFICANT CHANGE UP (ref 70–99)
GLUCOSE UR QL: NEGATIVE MG/DL — SIGNIFICANT CHANGE UP
HCT VFR BLD CALC: 40.1 % — SIGNIFICANT CHANGE UP (ref 34.5–45)
HGB BLD-MCNC: 13.6 G/DL — SIGNIFICANT CHANGE UP (ref 11.5–15.5)
INR BLD: 1.17 RATIO — HIGH (ref 0.88–1.16)
KETONES UR-MCNC: NEGATIVE — SIGNIFICANT CHANGE UP
LACTATE SERPL-SCNC: 1.1 MMOL/L — SIGNIFICANT CHANGE UP (ref 0.7–2)
LEUKOCYTE ESTERASE UR-ACNC: NEGATIVE — SIGNIFICANT CHANGE UP
LIDOCAIN IGE QN: 111 U/L — SIGNIFICANT CHANGE UP (ref 73–393)
LYMPHOCYTES # BLD AUTO: 1 K/UL — SIGNIFICANT CHANGE UP (ref 1–3.3)
LYMPHOCYTES # BLD AUTO: 18 % — SIGNIFICANT CHANGE UP (ref 13–44)
MCHC RBC-ENTMCNC: 34 GM/DL — SIGNIFICANT CHANGE UP (ref 32–36)
MCHC RBC-ENTMCNC: 34 PG — SIGNIFICANT CHANGE UP (ref 27–34)
MCV RBC AUTO: 100.2 FL — HIGH (ref 80–100)
MONOCYTES # BLD AUTO: 0.4 K/UL — SIGNIFICANT CHANGE UP (ref 0–0.9)
MONOCYTES NFR BLD AUTO: 6.8 % — SIGNIFICANT CHANGE UP (ref 2–14)
NEUTROPHILS # BLD AUTO: 4.3 K/UL — SIGNIFICANT CHANGE UP (ref 1.8–7.4)
NEUTROPHILS NFR BLD AUTO: 74.1 % — SIGNIFICANT CHANGE UP (ref 43–77)
NITRITE UR-MCNC: NEGATIVE — SIGNIFICANT CHANGE UP
PH UR: 7 — SIGNIFICANT CHANGE UP (ref 5–8)
PLATELET # BLD AUTO: 124 K/UL — LOW (ref 150–400)
POTASSIUM SERPL-MCNC: 4.6 MMOL/L — SIGNIFICANT CHANGE UP (ref 3.5–5.3)
POTASSIUM SERPL-SCNC: 4.6 MMOL/L — SIGNIFICANT CHANGE UP (ref 3.5–5.3)
PROT SERPL-MCNC: 7.1 GM/DL — SIGNIFICANT CHANGE UP (ref 6–8.3)
PROT UR-MCNC: NEGATIVE MG/DL — SIGNIFICANT CHANGE UP
PROTHROM AB SERPL-ACNC: 12.8 SEC — HIGH (ref 9.8–12.7)
RBC # BLD: 4.01 M/UL — SIGNIFICANT CHANGE UP (ref 3.8–5.2)
RBC # FLD: 12.6 % — SIGNIFICANT CHANGE UP (ref 11–15)
SODIUM SERPL-SCNC: 140 MMOL/L — SIGNIFICANT CHANGE UP (ref 135–145)
SP GR SPEC: 1 — LOW (ref 1.01–1.02)
TROPONIN I SERPL-MCNC: <.015 NG/ML — SIGNIFICANT CHANGE UP (ref 0.01–0.04)
TROPONIN I SERPL-MCNC: <.015 NG/ML — SIGNIFICANT CHANGE UP (ref 0.01–0.04)
UROBILINOGEN FLD QL: NEGATIVE MG/DL — SIGNIFICANT CHANGE UP
WBC # BLD: 5.8 K/UL — SIGNIFICANT CHANGE UP (ref 3.8–10.5)
WBC # FLD AUTO: 5.8 K/UL — SIGNIFICANT CHANGE UP (ref 3.8–10.5)

## 2017-12-12 PROCEDURE — 99285 EMERGENCY DEPT VISIT HI MDM: CPT

## 2017-12-12 PROCEDURE — 74176 CT ABD & PELVIS W/O CONTRAST: CPT | Mod: 26

## 2017-12-12 PROCEDURE — 71010: CPT | Mod: 26

## 2017-12-12 PROCEDURE — 99285 EMERGENCY DEPT VISIT HI MDM: CPT | Mod: 25

## 2017-12-12 RX ORDER — CYCLOBENZAPRINE HYDROCHLORIDE 10 MG/1
10 TABLET, FILM COATED ORAL ONCE
Qty: 0 | Refills: 0 | Status: COMPLETED | OUTPATIENT
Start: 2017-12-12 | End: 2017-12-12

## 2017-12-12 RX ORDER — METOPROLOL TARTRATE 50 MG
25 TABLET ORAL DAILY
Qty: 0 | Refills: 0 | Status: DISCONTINUED | OUTPATIENT
Start: 2017-12-12 | End: 2017-12-15

## 2017-12-12 RX ORDER — LEVOTHYROXINE SODIUM 125 MCG
50 TABLET ORAL DAILY
Qty: 0 | Refills: 0 | Status: DISCONTINUED | OUTPATIENT
Start: 2017-12-12 | End: 2017-12-15

## 2017-12-12 RX ORDER — ACETAMINOPHEN 500 MG
650 TABLET ORAL EVERY 6 HOURS
Qty: 0 | Refills: 0 | Status: DISCONTINUED | OUTPATIENT
Start: 2017-12-12 | End: 2017-12-15

## 2017-12-12 RX ORDER — OXYCODONE AND ACETAMINOPHEN 5; 325 MG/1; MG/1
1 TABLET ORAL EVERY 6 HOURS
Qty: 0 | Refills: 0 | Status: DISCONTINUED | OUTPATIENT
Start: 2017-12-12 | End: 2017-12-12

## 2017-12-12 RX ORDER — ACETAMINOPHEN 500 MG
500 TABLET ORAL EVERY 6 HOURS
Qty: 0 | Refills: 0 | Status: DISCONTINUED | OUTPATIENT
Start: 2017-12-12 | End: 2017-12-12

## 2017-12-12 RX ORDER — SIMVASTATIN 20 MG/1
20 TABLET, FILM COATED ORAL AT BEDTIME
Qty: 0 | Refills: 0 | Status: DISCONTINUED | OUTPATIENT
Start: 2017-12-12 | End: 2017-12-12

## 2017-12-12 RX ORDER — ENOXAPARIN SODIUM 100 MG/ML
40 INJECTION SUBCUTANEOUS EVERY 24 HOURS
Qty: 0 | Refills: 0 | Status: DISCONTINUED | OUTPATIENT
Start: 2017-12-12 | End: 2017-12-15

## 2017-12-12 RX ORDER — OXYCODONE AND ACETAMINOPHEN 5; 325 MG/1; MG/1
1 TABLET ORAL ONCE
Qty: 0 | Refills: 0 | Status: DISCONTINUED | OUTPATIENT
Start: 2017-12-12 | End: 2017-12-12

## 2017-12-12 RX ORDER — MORPHINE SULFATE 50 MG/1
2 CAPSULE, EXTENDED RELEASE ORAL EVERY 4 HOURS
Qty: 0 | Refills: 0 | Status: DISCONTINUED | OUTPATIENT
Start: 2017-12-12 | End: 2017-12-14

## 2017-12-12 RX ORDER — MORPHINE SULFATE 50 MG/1
2 CAPSULE, EXTENDED RELEASE ORAL ONCE
Qty: 0 | Refills: 0 | Status: DISCONTINUED | OUTPATIENT
Start: 2017-12-12 | End: 2017-12-12

## 2017-12-12 RX ORDER — SODIUM CHLORIDE 9 MG/ML
3 INJECTION INTRAMUSCULAR; INTRAVENOUS; SUBCUTANEOUS ONCE
Qty: 0 | Refills: 0 | Status: COMPLETED | OUTPATIENT
Start: 2017-12-12 | End: 2017-12-12

## 2017-12-12 RX ORDER — ASPIRIN/CALCIUM CARB/MAGNESIUM 324 MG
81 TABLET ORAL DAILY
Qty: 0 | Refills: 0 | Status: DISCONTINUED | OUTPATIENT
Start: 2017-12-12 | End: 2017-12-15

## 2017-12-12 RX ORDER — SIMVASTATIN 20 MG/1
20 TABLET, FILM COATED ORAL AT BEDTIME
Qty: 0 | Refills: 0 | Status: DISCONTINUED | OUTPATIENT
Start: 2017-12-13 | End: 2017-12-15

## 2017-12-12 RX ORDER — DOCUSATE SODIUM 100 MG
100 CAPSULE ORAL
Qty: 0 | Refills: 0 | Status: DISCONTINUED | OUTPATIENT
Start: 2017-12-12 | End: 2017-12-15

## 2017-12-12 RX ORDER — MORPHINE SULFATE 50 MG/1
4 CAPSULE, EXTENDED RELEASE ORAL ONCE
Qty: 0 | Refills: 0 | Status: DISCONTINUED | OUTPATIENT
Start: 2017-12-12 | End: 2017-12-12

## 2017-12-12 RX ORDER — AMIODARONE HYDROCHLORIDE 400 MG/1
200 TABLET ORAL DAILY
Qty: 0 | Refills: 0 | Status: DISCONTINUED | OUTPATIENT
Start: 2017-12-12 | End: 2017-12-15

## 2017-12-12 RX ORDER — SODIUM CHLORIDE 9 MG/ML
1000 INJECTION INTRAMUSCULAR; INTRAVENOUS; SUBCUTANEOUS ONCE
Qty: 0 | Refills: 0 | Status: COMPLETED | OUTPATIENT
Start: 2017-12-12 | End: 2017-12-12

## 2017-12-12 RX ORDER — SODIUM CHLORIDE 9 MG/ML
500 INJECTION INTRAMUSCULAR; INTRAVENOUS; SUBCUTANEOUS ONCE
Qty: 0 | Refills: 0 | Status: COMPLETED | OUTPATIENT
Start: 2017-12-12 | End: 2017-12-12

## 2017-12-12 RX ORDER — PANTOPRAZOLE SODIUM 20 MG/1
40 TABLET, DELAYED RELEASE ORAL
Qty: 0 | Refills: 0 | Status: DISCONTINUED | OUTPATIENT
Start: 2017-12-12 | End: 2017-12-15

## 2017-12-12 RX ORDER — METHOCARBAMOL 500 MG/1
750 TABLET, FILM COATED ORAL THREE TIMES A DAY
Qty: 0 | Refills: 0 | Status: DISCONTINUED | OUTPATIENT
Start: 2017-12-12 | End: 2017-12-15

## 2017-12-12 RX ADMIN — CYCLOBENZAPRINE HYDROCHLORIDE 10 MILLIGRAM(S): 10 TABLET, FILM COATED ORAL at 16:10

## 2017-12-12 RX ADMIN — MORPHINE SULFATE 4 MILLIGRAM(S): 50 CAPSULE, EXTENDED RELEASE ORAL at 21:35

## 2017-12-12 RX ADMIN — SODIUM CHLORIDE 1000 MILLILITER(S): 9 INJECTION INTRAMUSCULAR; INTRAVENOUS; SUBCUTANEOUS at 06:07

## 2017-12-12 RX ADMIN — MORPHINE SULFATE 2 MILLIGRAM(S): 50 CAPSULE, EXTENDED RELEASE ORAL at 07:34

## 2017-12-12 RX ADMIN — ENOXAPARIN SODIUM 40 MILLIGRAM(S): 100 INJECTION SUBCUTANEOUS at 21:49

## 2017-12-12 RX ADMIN — SODIUM CHLORIDE 3 MILLILITER(S): 9 INJECTION INTRAMUSCULAR; INTRAVENOUS; SUBCUTANEOUS at 06:07

## 2017-12-12 RX ADMIN — SIMVASTATIN 20 MILLIGRAM(S): 20 TABLET, FILM COATED ORAL at 21:49

## 2017-12-12 RX ADMIN — OXYCODONE AND ACETAMINOPHEN 1 TABLET(S): 5; 325 TABLET ORAL at 16:10

## 2017-12-12 RX ADMIN — SODIUM CHLORIDE 9999 MILLILITER(S): 9 INJECTION INTRAMUSCULAR; INTRAVENOUS; SUBCUTANEOUS at 08:44

## 2017-12-12 RX ADMIN — OXYCODONE AND ACETAMINOPHEN 1 TABLET(S): 5; 325 TABLET ORAL at 17:10

## 2017-12-12 RX ADMIN — MORPHINE SULFATE 2 MILLIGRAM(S): 50 CAPSULE, EXTENDED RELEASE ORAL at 06:07

## 2017-12-12 NOTE — ED ADULT TRIAGE NOTE - CHIEF COMPLAINT QUOTE
Back pains ongoing seen today in the emergency department. Treated for the pain and discharge. When she got home the pain returned

## 2017-12-12 NOTE — ED PROVIDER NOTE - PHYSICAL EXAMINATION
Gen: Alert, Well appearing. NAD    Head: NC, AT, PERRL, EOMI, normal lids/conjunctiva   ENT: Bilateral TM WNL, normal hearing, patent oropharynx without erythema/exudate, uvula midline  Neck: supple, no tenderness/meningismus/JVD   Pulm: Bilateral clear BS, normal resp effort, no wheeze/stridor/retractions  CV: RRR, no M/R/G, +dist pulses   Abd: soft, NT/ND, +BS, no guarding/rebound tenderness  Mskel: + left flank/lat dorsi tender with even superficial touch. no edema/erythema/cyanosis   Skin: no rash   Neuro: AAOx3, no sensory/motor deficits, CN 2-12 intact

## 2017-12-12 NOTE — ED ADULT TRIAGE NOTE - CHIEF COMPLAINT QUOTE
BIBA,  c/o mid-low back pain.  Hx herinated discs and arthritis.  per EMS, pt was ambulatory on scene

## 2017-12-12 NOTE — ED PROVIDER NOTE - PSH
H/O cardiac pacemaker  [employer] placed 12/10  S/P Colonoscopic Polypectomy  15 years ago per pt  S/P TKR (total knee replacement)  Right TKR in 5/16

## 2017-12-12 NOTE — H&P ADULT - PSH
H/O cardiac pacemaker  [employer] placed 12/10  S/P Colonoscopic Polypectomy  15 years ago per pt  S/P TKR (total knee replacement)  Right TKR in 5/16

## 2017-12-12 NOTE — ED ADULT NURSE REASSESSMENT NOTE - NS ED NURSE REASSESS COMMENT FT1
pt received at the bedside.  Left forearm IV, 1 liter NS infusing. Urine sample was taken, pending results. Pt denies any pain. Full range of motion noted. Plan of care explained, family member contacted. Side rails up.

## 2017-12-12 NOTE — ED PROVIDER NOTE - OBJECTIVE STATEMENT
87yo female with pmh afib, HTN, chronic back pain, presents with left flank pain. Pt seen earlier this morning (labs, CE, CT, UA) and dc. Pt did feel well, but when she went home pain came back . Pt did not take her tramadol because she was afraid it would interact with what she had here. Pt has h/o similar pain and was last admitted for it last year.     ROS: No fever/chills. No photophobia/eye pain/changes in vision, No ear pain/sore throat/dysphagia, No chest pain/palpitations. No SOB/cough/stridor. No abdominal pain, N/V/D, no black/bloody bm. No dysuria/frequency/discharge, No headache. No Dizziness.  No rash.  No numbness/tingling/weakness.

## 2017-12-12 NOTE — ED ADULT NURSE NOTE - OBJECTIVE STATEMENT
Patient presents in ED complaining of backpain radiating from lumbar region to coccyx x 2 days. She has HX of herniated discs and arthritis.

## 2017-12-12 NOTE — ED PROVIDER NOTE - PHYSICAL EXAMINATION
General:     NAD, well-nourished, well-appearing  Head:     NC/AT, EOMI, oral mucosa moist  Neck:     trachea midline  Lungs:     CTA b/l, no w/r/r  CVS:     S1S2, RRR, no m/g/r  Abd:     +BS, s/nt/nd, no organomegaly, mild left flank tenderness  Ext:    2+ radial and pedal pulses, no c/c/e  Neuro: AAOx3, no sensory/motor deficits

## 2017-12-12 NOTE — ED PROVIDER NOTE - OBJECTIVE STATEMENT
88yoF; with pmh signif for chronic back pain; now p/w back pain--left flank pain, intermittent, worse with movement. denies n/v/d. denies dysuria, hematuria, frequency, urgency.  denies cp/sob/palp.   PMH: chronic back pain  SOCIAL: No EtOH/tobacco/illicit substance use  ROS: denies fever, chills, sweats; denies visual changes or eye pain or discharge; denies sore throat, rhinorrhea, tinnitus, ear pain, hearing changes; denies abd pain, n/v/d; denies cp/palp; denies sob/cough/sputum production; denies neck pain, muscle aches; denies dysuria, hematuria, frequency, urgency; denies headache; denies numbness/tingling/weakness; denies changes in neurologic status/function; denies rashes  +back pain

## 2017-12-12 NOTE — ED ADULT NURSE NOTE - PSH
H/O cardiac pacemaker  [employer] placed 12/10  S/P Colonoscopic Polypectomy  15 years ago per pt  S/P TKR (total knee replacement)  Right TKR in 5/16

## 2017-12-12 NOTE — ED ADULT NURSE NOTE - PSH
H/O cardiac pacemaker  [employer] placed 12/10  S/P Colonoscopic Polypectomy  15 years ago per pt  S/P TKR (total knee replacement)  Right TKR in 5/16

## 2017-12-12 NOTE — H&P ADULT - NSHPLABSRESULTS_GEN_ALL_CORE
13.6   5.8   )-----------( 124      ( 12 Dec 2017 06:11 )             40.1         140  |  103  |  11  ----------------------------<  97  4.6   |  27  |  0.66    Ca    8.6      12 Dec 2017 06:11    TPro  7.1  /  Alb  3.3  /  TBili  1.5<H>  /  DBili  x   /  AST  28  /  ALT  20  /  AlkPhos  47  12    PT/INR - ( 12 Dec 2017 06:11 )   PT: 12.8 sec;   INR: 1.17 ratio         PTT - ( 12 Dec 2017 06:11 )  PTT:34.0 sec  Urinalysis Basic - ( 12 Dec 2017 07:40 )    Color: Yellow / Appearance: Clear / S.005 / pH: x  Gluc: x / Ketone: Negative  / Bili: Negative / Urobili: Negative mg/dL   Blood: x / Protein: Negative mg/dL / Nitrite: Negative   Leuk Esterase: Negative / RBC: x / WBC x   Sq Epi: x / Non Sq Epi: x / Bacteria: x

## 2017-12-12 NOTE — ED PROVIDER NOTE - PROGRESS NOTE DETAILS
endorsed by dr. esposito pending reassessment and CE for weakness. PT feeeling much better. CE neg. EKG without acute pathology.  Pt has tramadol at home for when she has same pain, but has only taken tylenol so far. Discussed results and outcome of testing with the patient.  Patient given copy of available results. Patient advised to please follow up with their PMD within the next 24 hours and return to the Emergency Department for worsening symptoms or any other concerns.

## 2017-12-12 NOTE — ED ADULT NURSE REASSESSMENT NOTE - NS ED NURSE REASSESS COMMENT FT1
Patient c/o mid back pain, house MD paged
sent to xray
Recived patient AAOX3, patient states I had mid back pain x4 years, pain increases when sitting for long periods of time 4/10 on pain scale

## 2017-12-13 RX ADMIN — Medication 81 MILLIGRAM(S): at 13:32

## 2017-12-13 RX ADMIN — MORPHINE SULFATE 2 MILLIGRAM(S): 50 CAPSULE, EXTENDED RELEASE ORAL at 02:41

## 2017-12-13 RX ADMIN — MORPHINE SULFATE 2 MILLIGRAM(S): 50 CAPSULE, EXTENDED RELEASE ORAL at 14:30

## 2017-12-13 RX ADMIN — ENOXAPARIN SODIUM 40 MILLIGRAM(S): 100 INJECTION SUBCUTANEOUS at 22:00

## 2017-12-13 RX ADMIN — SIMVASTATIN 20 MILLIGRAM(S): 20 TABLET, FILM COATED ORAL at 22:01

## 2017-12-13 RX ADMIN — MORPHINE SULFATE 2 MILLIGRAM(S): 50 CAPSULE, EXTENDED RELEASE ORAL at 13:34

## 2017-12-13 RX ADMIN — MORPHINE SULFATE 2 MILLIGRAM(S): 50 CAPSULE, EXTENDED RELEASE ORAL at 02:24

## 2017-12-13 RX ADMIN — METHOCARBAMOL 750 MILLIGRAM(S): 500 TABLET, FILM COATED ORAL at 22:00

## 2017-12-13 RX ADMIN — METHOCARBAMOL 750 MILLIGRAM(S): 500 TABLET, FILM COATED ORAL at 13:34

## 2017-12-13 RX ADMIN — MORPHINE SULFATE 2 MILLIGRAM(S): 50 CAPSULE, EXTENDED RELEASE ORAL at 22:09

## 2017-12-13 RX ADMIN — MORPHINE SULFATE 2 MILLIGRAM(S): 50 CAPSULE, EXTENDED RELEASE ORAL at 21:56

## 2017-12-13 RX ADMIN — METHOCARBAMOL 750 MILLIGRAM(S): 500 TABLET, FILM COATED ORAL at 01:27

## 2017-12-13 NOTE — PROGRESS NOTE ADULT - SUBJECTIVE AND OBJECTIVE BOX
Patient is a 88y old  Female who presents with a chief complaint of low back (12 Dec 2017 21:31)      INTERVAL HPI/OVERNIGHT EVENTS:  pt is having intractable pain but little better today  MEDICATIONS  (STANDING):  amiodarone    Tablet 200 milliGRAM(s) Oral daily  aspirin enteric coated 81 milliGRAM(s) Oral daily  docusate sodium 100 milliGRAM(s) Oral two times a day  enoxaparin Injectable 40 milliGRAM(s) SubCutaneous every 24 hours  levothyroxine 50 MICROGram(s) Oral daily  methocarbamol 750 milliGRAM(s) Oral three times a day  metoprolol succinate ER 25 milliGRAM(s) Oral daily  pantoprazole    Tablet 40 milliGRAM(s) Oral before breakfast  simvastatin 20 milliGRAM(s) Oral at bedtime    MEDICATIONS  (PRN):  acetaminophen   Tablet. 650 milliGRAM(s) Oral every 6 hours PRN Moderate Pain (4 - 6)  morphine  - Injectable 2 milliGRAM(s) IV Push every 4 hours PRN Severe Pain (7 - 10)      Allergies    lidocaine (Urticaria)    Intolerances        REVIEW OF SYSTEMS:  CONSTITUTIONAL: No fever, weight loss, or fatigue  EYES: No eye pain, visual disturbances, or discharge  ENMT:  No difficulty hearing, tinnitus, vertigo; No sinus or throat pain  NECK: No pain or stiffness  BREASTS: No pain, masses, or nipple discharge  RESPIRATORY: No cough, wheezing, chills or hemoptysis; No shortness of breath  CARDIOVASCULAR: No chest pain, palpitations, dizziness, or leg swelling  GASTROINTESTINAL: No abdominal or epigastric pain. No nausea, vomiting, or hematemesis; No diarrhea or constipation. No melena or hematochezia.  GENITOURINARY: No dysuria, frequency, hematuria, or incontinence  NEUROLOGICAL: No headaches, memory loss, loss of strength, numbness, or tremors  SKIN: No itching, burning, rashes, or lesions   LYMPH NODES: No enlarged glands  ENDOCRINE: No heat or cold intolerance; No hair loss  MUSCULOSKELETAL: No joint pain or swelling; No muscle, back, or extremity pain  PSYCHIATRIC: No depression, anxiety, mood swings, or difficulty sleeping  HEME/LYMPH: No easy bruising, or bleeding gums  ALLERGY AND IMMUNOLOGIC: No hives or eczema    Vital Signs Last 24 Hrs  T(C): 36.1 (13 Dec 2017 10:47), Max: 36.8 (12 Dec 2017 19:12)  T(F): 97 (13 Dec 2017 10:47), Max: 98.2 (12 Dec 2017 19:12)  HR: 89 (13 Dec 2017 10:47) (50 - 89)  BP: 100/80 (13 Dec 2017 10:47) (100/62 - 146/76)  BP(mean): --  RR: 18 (13 Dec 2017 10:47) (15 - 18)  SpO2: 97% (13 Dec 2017 10:47) (95% - 97%)    PHYSICAL EXAM:  GENERAL: NAD, well-groomed, well-developed  HEAD:  Atraumatic, Normocephalic  EYES: EOMI, PERRLA, conjunctiva and sclera clear  ENMT: No tonsillar erythema, exudates, or enlargement; Moist mucous membranes, Good dentition, No lesions  NECK: Supple, No JVD, Normal thyroid  NERVOUS SYSTEM:  Alert & Oriented X3, Good concentration; Motor Strength 5/5 B/L upper and lower extremities; DTRs 2+ intact and symmetric  CHEST/LUNG: Clear to percussion bilaterally; No rales, rhonchi, wheezing, or rubs  HEART: Regular rate and rhythm; No murmurs, rubs, or gallops  ABDOMEN: Soft, Nontender, Nondistended; Bowel sounds present  EXTREMITIES:  2+ Peripheral Pulses, No clubbing, cyanosis, or edema  LYMPH: No lymphadenopathy noted  SKIN: No rashes or lesions    LABS:                        13.6   5.8   )-----------( 124      ( 12 Dec 2017 06:11 )             40.1     12-    140  |  103  |  11  ----------------------------<  97  4.6   |  27  |  0.66    Ca    8.6      12 Dec 2017 06:11    TPro  7.1  /  Alb  3.3  /  TBili  1.5<H>  /  DBili  x   /  AST  28  /  ALT  20  /  AlkPhos  47  12-12    PT/INR - ( 12 Dec 2017 06:11 )   PT: 12.8 sec;   INR: 1.17 ratio         PTT - ( 12 Dec 2017 06:11 )  PTT:34.0 sec  Urinalysis Basic - ( 12 Dec 2017 07:40 )    Color: Yellow / Appearance: Clear / S.005 / pH: x  Gluc: x / Ketone: Negative  / Bili: Negative / Urobili: Negative mg/dL   Blood: x / Protein: Negative mg/dL / Nitrite: Negative   Leuk Esterase: Negative / RBC: x / WBC x   Sq Epi: x / Non Sq Epi: x / Bacteria: x      CAPILLARY BLOOD GLUCOSE        CULTURES:    HEMOGLOBIN A1C:    CHOLESTEROL:        RADIOLOGY & ADDITIONAL TESTS:

## 2017-12-14 RX ORDER — TRAMADOL HYDROCHLORIDE 50 MG/1
50 TABLET ORAL ONCE
Qty: 0 | Refills: 0 | Status: DISCONTINUED | OUTPATIENT
Start: 2017-12-14 | End: 2017-12-14

## 2017-12-14 RX ORDER — TRAMADOL HYDROCHLORIDE 50 MG/1
50 TABLET ORAL EVERY 6 HOURS
Qty: 0 | Refills: 0 | Status: DISCONTINUED | OUTPATIENT
Start: 2017-12-14 | End: 2017-12-15

## 2017-12-14 RX ADMIN — TRAMADOL HYDROCHLORIDE 50 MILLIGRAM(S): 50 TABLET ORAL at 03:26

## 2017-12-14 RX ADMIN — TRAMADOL HYDROCHLORIDE 50 MILLIGRAM(S): 50 TABLET ORAL at 15:00

## 2017-12-14 RX ADMIN — Medication 100 MILLIGRAM(S): at 06:25

## 2017-12-14 RX ADMIN — METHOCARBAMOL 750 MILLIGRAM(S): 500 TABLET, FILM COATED ORAL at 21:54

## 2017-12-14 RX ADMIN — METHOCARBAMOL 750 MILLIGRAM(S): 500 TABLET, FILM COATED ORAL at 14:17

## 2017-12-14 RX ADMIN — Medication 81 MILLIGRAM(S): at 11:51

## 2017-12-14 RX ADMIN — TRAMADOL HYDROCHLORIDE 50 MILLIGRAM(S): 50 TABLET ORAL at 04:26

## 2017-12-14 RX ADMIN — AMIODARONE HYDROCHLORIDE 200 MILLIGRAM(S): 400 TABLET ORAL at 11:51

## 2017-12-14 RX ADMIN — PANTOPRAZOLE SODIUM 40 MILLIGRAM(S): 20 TABLET, DELAYED RELEASE ORAL at 06:24

## 2017-12-14 RX ADMIN — TRAMADOL HYDROCHLORIDE 50 MILLIGRAM(S): 50 TABLET ORAL at 14:17

## 2017-12-14 RX ADMIN — Medication 100 MILLIGRAM(S): at 17:47

## 2017-12-14 RX ADMIN — ENOXAPARIN SODIUM 40 MILLIGRAM(S): 100 INJECTION SUBCUTANEOUS at 21:55

## 2017-12-14 RX ADMIN — Medication 50 MICROGRAM(S): at 06:24

## 2017-12-14 RX ADMIN — METHOCARBAMOL 750 MILLIGRAM(S): 500 TABLET, FILM COATED ORAL at 06:24

## 2017-12-14 RX ADMIN — SIMVASTATIN 20 MILLIGRAM(S): 20 TABLET, FILM COATED ORAL at 21:53

## 2017-12-14 RX ADMIN — TRAMADOL HYDROCHLORIDE 50 MILLIGRAM(S): 50 TABLET ORAL at 23:33

## 2017-12-14 NOTE — DIETITIAN INITIAL EVALUATION ADULT. - ENERGY NEEDS
Height (cm): 165.1 (12-12), 165.1 (12-12)  Weight (kg): 45.6 (12-12), 52.6 (12-12)  BMI (kg/m2): 16.7 (12-12), 19.3 (12-12)  IBW: 57kg (125#)  % IBW: 95%

## 2017-12-14 NOTE — DIETITIAN INITIAL EVALUATION ADULT. - NS AS NUTRI INTERV MEDICAL AND FOOD SUPPLEMENTS
Recommend Ensure Enlive 3x/day (provides 1050kcal & 60g protein)/Commercial beverage Commercial beverage/Recommend Ensure Enlive 2x/day (provides 700kcal & 40g protein)

## 2017-12-14 NOTE — DIETITIAN INITIAL EVALUATION ADULT. - OTHER INFO
Pt seen for BMI<18. Pt lives at home, has a HHA 4x/week who helps shop & cook, also has a supportive son who lives close by. Pt reports persistent loss of appetite in past 2 years; reports #. Pt instructed to take Ensure Enlive supplement at home if discharged Pt seen for BMI<18. Pt lives at home, has a HHA 4x/week who helps shop & cook, also has a supportive son who lives close by. Pt reports persistent loss of appetite in past 2 years; Pt denies chewing/swallowing problems, denies n/v, constipation, diarrhea. Pt instructed to take Ensure Enlive supplement at home when discharged

## 2017-12-14 NOTE — PROGRESS NOTE ADULT - SUBJECTIVE AND OBJECTIVE BOX
Patient is a 88y old  Female who presents with a chief complaint of low back (12 Dec 2017 21:31)      INTERVAL HPI/OVERNIGHT EVENTS:  pt is still having back pain  MEDICATIONS  (STANDING):  amiodarone    Tablet 200 milliGRAM(s) Oral daily  aspirin enteric coated 81 milliGRAM(s) Oral daily  docusate sodium 100 milliGRAM(s) Oral two times a day  enoxaparin Injectable 40 milliGRAM(s) SubCutaneous every 24 hours  levothyroxine 50 MICROGram(s) Oral daily  methocarbamol 750 milliGRAM(s) Oral three times a day  metoprolol succinate ER 25 milliGRAM(s) Oral daily  pantoprazole    Tablet 40 milliGRAM(s) Oral before breakfast  simvastatin 20 milliGRAM(s) Oral at bedtime    MEDICATIONS  (PRN):  acetaminophen   Tablet. 650 milliGRAM(s) Oral every 6 hours PRN Moderate Pain (4 - 6)  morphine  - Injectable 2 milliGRAM(s) IV Push every 4 hours PRN Severe Pain (7 - 10)      Allergies    lidocaine (Urticaria)    Intolerances        REVIEW OF SYSTEMS:  CONSTITUTIONAL: No fever, weight loss, or fatigue  EYES: No eye pain, visual disturbances, or discharge  ENMT:  No difficulty hearing, tinnitus, vertigo; No sinus or throat pain  NECK: No pain or stiffness  BREASTS: No pain, masses, or nipple discharge  RESPIRATORY: No cough, wheezing, chills or hemoptysis; No shortness of breath  CARDIOVASCULAR: No chest pain, palpitations, dizziness, or leg swelling  GASTROINTESTINAL: No abdominal or epigastric pain. No nausea, vomiting, or hematemesis; No diarrhea or constipation. No melena or hematochezia.  GENITOURINARY: No dysuria, frequency, hematuria, or incontinence  NEUROLOGICAL: No headaches, memory loss, loss of strength, numbness, or tremors  SKIN: No itching, burning, rashes, or lesions   LYMPH NODES: No enlarged glands  ENDOCRINE: No heat or cold intolerance; No hair loss  MUSCULOSKELETAL: No joint pain or swelling; No muscle, back, or extremity pain  PSYCHIATRIC: No depression, anxiety, mood swings, or difficulty sleeping  HEME/LYMPH: No easy bruising, or bleeding gums  ALLERGY AND IMMUNOLOGIC: No hives or eczema    Vital Signs Last 24 Hrs  T(C): 36.7 (14 Dec 2017 05:28), Max: 36.8 (13 Dec 2017 16:49)  T(F): 98 (14 Dec 2017 05:28), Max: 98.2 (13 Dec 2017 16:49)  HR: 54 (14 Dec 2017 05:28) (54 - 89)  BP: 118/68 (14 Dec 2017 05:28) (96/57 - 118/68)  BP(mean): --  RR: 18 (14 Dec 2017 05:28) (17 - 18)  SpO2: 95% (14 Dec 2017 05:28) (94% - 97%)    PHYSICAL EXAM:  GENERAL: NAD, well-groomed, well-developed  HEAD:  Atraumatic, Normocephalic  EYES: EOMI, PERRLA, conjunctiva and sclera clear  ENMT: No tonsillar erythema, exudates, or enlargement; Moist mucous membranes, Good dentition, No lesions  NECK: Supple, No JVD, Normal thyroid  NERVOUS SYSTEM:  Alert & Oriented X3, Good concentration; Motor Strength 5/5 B/L upper and lower extremities; DTRs 2+ intact and symmetric  CHEST/LUNG: Clear to percussion bilaterally; No rales, rhonchi, wheezing, or rubs  HEART: Regular rate and rhythm; No murmurs, rubs, or gallops  ABDOMEN: Soft, Nontender, Nondistended; Bowel sounds present  EXTREMITIES:  2+ Peripheral Pulses, No clubbing, cyanosis, or edema  LYMPH: No lymphadenopathy noted  SKIN: No rashes or lesions    LABS:              CAPILLARY BLOOD GLUCOSE        CULTURES:    HEMOGLOBIN A1C:    CHOLESTEROL:        RADIOLOGY & ADDITIONAL TESTS:

## 2017-12-14 NOTE — DIETITIAN INITIAL EVALUATION ADULT. - PHYSICAL APPEARANCE
BMI=19.7; no edema noted; Nutrition focused physical exam conducted ; found signs of malnutrition [ ]absent [ x]present.  Subcutaneous fat loss: [severe ] Orbital fat pads region, [WDL ]Buccal fat region, [ moderate]Triceps region,  [moderate ]Ribs region.  Muscle wasting: [severe ]Temples region, [severe ]Clavicle region, [severe ]Shoulder region, [ unable]Scapula region, [moderate ]Interosseous region,  [unable ]thigh region, [moderate ]Calf region/underweight

## 2017-12-14 NOTE — CHART NOTE - NSCHARTNOTEFT_GEN_A_CORE
Upon Nutritional Assessment by the Registered Dietitian your patient was determined to meet criteria / has evidence of the following diagnosis/diagnoses:          [ ]  Mild Protein Calorie Malnutrition        [ ]  Moderate Protein Calorie Malnutrition        [X ] Severe Protein Calorie Malnutrition        [ ] Unspecified Protein Calorie Malnutrition        [ ] Underweight / BMI <19        [ ] Morbid Obesity / BMI > 40      Findings as based on:  •  Comprehensive nutrition assessment and consultation  •  Calorie counts (nutrient intake analysis)  •  Food acceptance and intake status from observations by staff  •  Follow up  •  Patient education  •  Intervention secondary to interdisciplinary rounds  •   concerns      Treatment:    The following diet has been recommended:  Regular diet c Ensure Enlive x 3/day (provides 1050 kcal, 60 g protein) for additional nutrition support      PROVIDER Section:     By signing this assessment you are acknowledging and agree with the diagnosis/diagnoses assigned by the Registered Dietitian    Comments:

## 2017-12-15 ENCOUNTER — TRANSCRIPTION ENCOUNTER (OUTPATIENT)
Age: 82
End: 2017-12-15

## 2017-12-15 VITALS
HEART RATE: 51 BPM | DIASTOLIC BLOOD PRESSURE: 74 MMHG | OXYGEN SATURATION: 97 % | RESPIRATION RATE: 17 BRPM | SYSTOLIC BLOOD PRESSURE: 125 MMHG | TEMPERATURE: 98 F

## 2017-12-15 RX ORDER — TRAMADOL HYDROCHLORIDE 50 MG/1
1 TABLET ORAL
Qty: 30 | Refills: 0
Start: 2017-12-15 | End: 2017-12-24

## 2017-12-15 RX ORDER — METHOCARBAMOL 500 MG/1
1 TABLET, FILM COATED ORAL
Qty: 30 | Refills: 0 | OUTPATIENT
Start: 2017-12-15 | End: 2017-12-24

## 2017-12-15 RX ADMIN — Medication 100 MILLIGRAM(S): at 05:51

## 2017-12-15 RX ADMIN — TRAMADOL HYDROCHLORIDE 50 MILLIGRAM(S): 50 TABLET ORAL at 07:30

## 2017-12-15 RX ADMIN — PANTOPRAZOLE SODIUM 40 MILLIGRAM(S): 20 TABLET, DELAYED RELEASE ORAL at 05:52

## 2017-12-15 RX ADMIN — TRAMADOL HYDROCHLORIDE 50 MILLIGRAM(S): 50 TABLET ORAL at 17:04

## 2017-12-15 RX ADMIN — METHOCARBAMOL 750 MILLIGRAM(S): 500 TABLET, FILM COATED ORAL at 05:51

## 2017-12-15 RX ADMIN — METHOCARBAMOL 750 MILLIGRAM(S): 500 TABLET, FILM COATED ORAL at 13:56

## 2017-12-15 RX ADMIN — TRAMADOL HYDROCHLORIDE 50 MILLIGRAM(S): 50 TABLET ORAL at 00:05

## 2017-12-15 RX ADMIN — Medication 81 MILLIGRAM(S): at 11:52

## 2017-12-15 RX ADMIN — Medication 100 MILLIGRAM(S): at 17:05

## 2017-12-15 RX ADMIN — AMIODARONE HYDROCHLORIDE 200 MILLIGRAM(S): 400 TABLET ORAL at 05:51

## 2017-12-15 RX ADMIN — Medication 25 MILLIGRAM(S): at 05:51

## 2017-12-15 RX ADMIN — Medication 50 MICROGRAM(S): at 05:51

## 2017-12-15 RX ADMIN — TRAMADOL HYDROCHLORIDE 50 MILLIGRAM(S): 50 TABLET ORAL at 06:31

## 2017-12-15 NOTE — PHYSICAL THERAPY INITIAL EVALUATION ADULT - ADDITIONAL COMMENTS
Patient lives in a private house with 7 steps to enter with B handrails which are close enough to use simultaneously. Patient lives with her daughter and has private aides come to her home daily, but patient does not remember how many hours per day.

## 2017-12-15 NOTE — DISCHARGE NOTE ADULT - PATIENT PORTAL LINK FT
“You can access the [facility] Patient Portal, offered by [facility], by registering with the following website: [email or web address]”

## 2017-12-15 NOTE — DISCHARGE NOTE ADULT - CARE PLAN
Principal Discharge DX:	Other closed fracture of lumbar vertebra, sequela  Goal:	pain control  Instructions for follow-up, activity and diet:	follow up with pmd in 1week  Secondary Diagnosis:	Hypothyroidism, unspecified type  Secondary Diagnosis:	Hypertension, unspecified type  Secondary Diagnosis:	Intractable back pain

## 2017-12-15 NOTE — DISCHARGE NOTE ADULT - MEDICATION SUMMARY - MEDICATIONS TO TAKE
I will START or STAY ON the medications listed below when I get home from the hospital:    aspirin 81 mg oral tablet  -- 1 tab(s) by mouth once a day  -- Indication: For Heart     mg oral tablet  -- 1 tab(s) by mouth every 8 hours as needed for pain  -- Do not take this drug if you are pregnant.  It is very important that you take or use this exactly as directed.  Do not skip doses or discontinue unless directed by your doctor.  May cause drowsiness or dizziness.  Obtain medical advice before taking any non-prescription drugs as some may affect the action of this medication.  Take with food or milk.    -- Indication: For Intractable back pain    traMADol 50 mg oral tablet  -- 1 tab(s) by mouth every 8 hours, As Needed  - for moderate pain MDD:3  -- Indication: For Other closed fracture of lumbar vertebra, sequela    amiodarone 200 mg oral tablet  -- 1 tab(s) by mouth once a day  -- Indication: For Heart    simvastatin 20 mg oral tablet  -- 1 tab(s) by mouth once a day (at bedtime)  -- Indication: For HLD    Metoprolol Succinate ER 25 mg oral tablet, extended release  -- 1 tab(s) by mouth once a day  -- Indication: For Hypertension, unspecified type    Colace sodium 100 mg oral capsule  -- 1 cap(s) by mouth 2 times a day -for constipation  -- Medication should be taken with plenty of water.    -- Indication: For constipation    methocarbamol 750 mg oral tablet  -- 1 tab(s) by mouth 3 times a day  -- May cause drowsiness.  Alcohol may intensify this effect.  Use care when operating dangerous machinery.    -- Indication: For Intractable back pain    esomeprazole 40 mg oral delayed release capsule  -- 1 cap(s) by mouth once a day  -- It is very important that you take or use this exactly as directed.  Do not skip doses or discontinue unless directed by your doctor.  Obtain medical advice before taking any non-prescription drugs as some may affect the action of this medication.  Swallow whole.  Do not crush.  Take medication on an empty stomach 1 hour before or 2 to 3 hours after a meal unless otherwise directed by your doctor.    -- Indication: For gerd    levothyroxine 50 mcg (0.05 mg) oral tablet  -- 1 tab(s) by mouth once a day  -- Indication: For Hypothyroidism, unspecified type

## 2017-12-15 NOTE — PHYSICAL THERAPY INITIAL EVALUATION ADULT - MODIFIED CLINICAL TEST OF SENSORY INTEGRATION IN BALANCE TEST
Barthel Index: Feeding Score _10/10__, Bathing Score __5/5_, Grooming Score _5/5__, Dressing Score _10/10__, Bowels Score _10/10__, Bladder Score _10/10__, Toilet Score _10/10__, Transfers Score _15/15__, Mobility Score _15/15__, Stairs Score _10/10__,     Total Score _100/100__

## 2017-12-15 NOTE — PHYSICAL THERAPY INITIAL EVALUATION ADULT - PERTINENT HX OF CURRENT PROBLEM, REHAB EVAL
Patient admitted to [facility] due to complaints of lower back pain limiting patient's functional mobility.

## 2017-12-20 DIAGNOSIS — Z95.0 PRESENCE OF CARDIAC PACEMAKER: ICD-10-CM

## 2017-12-20 DIAGNOSIS — I48.91 UNSPECIFIED ATRIAL FIBRILLATION: ICD-10-CM
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
## 2017-12-20 DIAGNOSIS — E78.5 HYPERLIPIDEMIA, UNSPECIFIED: ICD-10-CM

## 2017-12-20 DIAGNOSIS — E03.9 HYPOTHYROIDISM, UNSPECIFIED: ICD-10-CM

## 2017-12-20 DIAGNOSIS — S32.009S UNSPECIFIED FRACTURE OF UNSPECIFIED LUMBAR VERTEBRA, SEQUELA: ICD-10-CM

## 2017-12-20 DIAGNOSIS — Z96.651 PRESENCE OF RIGHT ARTIFICIAL KNEE JOINT: ICD-10-CM

## 2017-12-20 DIAGNOSIS — M54.5 LOW BACK PAIN: ICD-10-CM

## 2017-12-20 DIAGNOSIS — E43 UNSPECIFIED SEVERE PROTEIN-CALORIE MALNUTRITION: ICD-10-CM

## 2017-12-20 DIAGNOSIS — M15.9 POLYOSTEOARTHRITIS, UNSPECIFIED: ICD-10-CM

## 2017-12-20 DIAGNOSIS — Z88.8 ALLERGY STATUS TO OTHER DRUGS, MEDICAMENTS AND BIOLOGICAL SUBSTANCES STATUS: ICD-10-CM

## 2017-12-21 ENCOUNTER — EMERGENCY (EMERGENCY)
Facility: HOSPITAL | Age: 82
LOS: 0 days | Discharge: HOME | End: 2017-12-21
Attending: EMERGENCY MEDICINE
Payer: MEDICARE

## 2017-12-21 VITALS
RESPIRATION RATE: 16 BRPM | HEART RATE: 70 BPM | TEMPERATURE: 98 F | HEIGHT: 65 IN | WEIGHT: 108.91 LBS | SYSTOLIC BLOOD PRESSURE: 96 MMHG | OXYGEN SATURATION: 94 % | DIASTOLIC BLOOD PRESSURE: 63 MMHG

## 2017-12-21 VITALS
HEART RATE: 80 BPM | RESPIRATION RATE: 18 BRPM | SYSTOLIC BLOOD PRESSURE: 124 MMHG | DIASTOLIC BLOOD PRESSURE: 72 MMHG | TEMPERATURE: 99 F | OXYGEN SATURATION: 99 %

## 2017-12-21 DIAGNOSIS — Z96.659 PRESENCE OF UNSPECIFIED ARTIFICIAL KNEE JOINT: Chronic | ICD-10-CM

## 2017-12-21 DIAGNOSIS — Z95.0 PRESENCE OF CARDIAC PACEMAKER: Chronic | ICD-10-CM

## 2017-12-21 LAB
ALBUMIN SERPL ELPH-MCNC: 3.5 G/DL — SIGNIFICANT CHANGE UP (ref 3.3–5)
ALP SERPL-CCNC: 51 U/L — SIGNIFICANT CHANGE UP (ref 40–120)
ALT FLD-CCNC: 30 U/L — SIGNIFICANT CHANGE UP (ref 12–78)
ANION GAP SERPL CALC-SCNC: 8 MMOL/L — SIGNIFICANT CHANGE UP (ref 5–17)
AST SERPL-CCNC: 29 U/L — SIGNIFICANT CHANGE UP (ref 15–37)
BASOPHILS # BLD AUTO: 0 K/UL — SIGNIFICANT CHANGE UP (ref 0–0.2)
BASOPHILS NFR BLD AUTO: 0.8 % — SIGNIFICANT CHANGE UP (ref 0–2)
BILIRUB SERPL-MCNC: 2.4 MG/DL — HIGH (ref 0.2–1.2)
BUN SERPL-MCNC: 15 MG/DL — SIGNIFICANT CHANGE UP (ref 7–23)
CALCIUM SERPL-MCNC: 8.8 MG/DL — SIGNIFICANT CHANGE UP (ref 8.5–10.1)
CHLORIDE SERPL-SCNC: 102 MMOL/L — SIGNIFICANT CHANGE UP (ref 96–108)
CO2 SERPL-SCNC: 28 MMOL/L — SIGNIFICANT CHANGE UP (ref 22–31)
CREAT SERPL-MCNC: 0.82 MG/DL — SIGNIFICANT CHANGE UP (ref 0.5–1.3)
EOSINOPHIL # BLD AUTO: 0 K/UL — SIGNIFICANT CHANGE UP (ref 0–0.5)
EOSINOPHIL NFR BLD AUTO: 0.1 % — SIGNIFICANT CHANGE UP (ref 0–6)
GLUCOSE SERPL-MCNC: 164 MG/DL — HIGH (ref 70–99)
HCT VFR BLD CALC: 42.1 % — SIGNIFICANT CHANGE UP (ref 34.5–45)
HGB BLD-MCNC: 14.3 G/DL — SIGNIFICANT CHANGE UP (ref 11.5–15.5)
LYMPHOCYTES # BLD AUTO: 0.4 K/UL — LOW (ref 1–3.3)
LYMPHOCYTES # BLD AUTO: 8.2 % — LOW (ref 13–44)
MCHC RBC-ENTMCNC: 33.8 PG — SIGNIFICANT CHANGE UP (ref 27–34)
MCHC RBC-ENTMCNC: 34 GM/DL — SIGNIFICANT CHANGE UP (ref 32–36)
MCV RBC AUTO: 99.4 FL — SIGNIFICANT CHANGE UP (ref 80–100)
MONOCYTES # BLD AUTO: 0.2 K/UL — SIGNIFICANT CHANGE UP (ref 0–0.9)
MONOCYTES NFR BLD AUTO: 4.2 % — SIGNIFICANT CHANGE UP (ref 2–14)
NEUTROPHILS # BLD AUTO: 4.3 K/UL — SIGNIFICANT CHANGE UP (ref 1.8–7.4)
NEUTROPHILS NFR BLD AUTO: 86.7 % — HIGH (ref 43–77)
PLATELET # BLD AUTO: 149 K/UL — LOW (ref 150–400)
POTASSIUM SERPL-MCNC: 3.7 MMOL/L — SIGNIFICANT CHANGE UP (ref 3.5–5.3)
POTASSIUM SERPL-SCNC: 3.7 MMOL/L — SIGNIFICANT CHANGE UP (ref 3.5–5.3)
PROT SERPL-MCNC: 7.3 GM/DL — SIGNIFICANT CHANGE UP (ref 6–8.3)
RBC # BLD: 4.23 M/UL — SIGNIFICANT CHANGE UP (ref 3.8–5.2)
RBC # FLD: 13 % — SIGNIFICANT CHANGE UP (ref 11–15)
SODIUM SERPL-SCNC: 138 MMOL/L — SIGNIFICANT CHANGE UP (ref 135–145)
WBC # BLD: 5 K/UL — SIGNIFICANT CHANGE UP (ref 3.8–10.5)
WBC # FLD AUTO: 5 K/UL — SIGNIFICANT CHANGE UP (ref 3.8–10.5)

## 2017-12-21 PROCEDURE — 99284 EMERGENCY DEPT VISIT MOD MDM: CPT

## 2017-12-21 PROCEDURE — 72074 X-RAY EXAM THORAC SPINE4/>VW: CPT | Mod: 26

## 2017-12-21 PROCEDURE — 72100 X-RAY EXAM L-S SPINE 2/3 VWS: CPT | Mod: 26

## 2017-12-21 RX ORDER — ACETAMINOPHEN 500 MG
650 TABLET ORAL ONCE
Qty: 0 | Refills: 0 | Status: COMPLETED | OUTPATIENT
Start: 2017-12-21 | End: 2017-12-21

## 2017-12-21 RX ORDER — OXYCODONE AND ACETAMINOPHEN 5; 325 MG/1; MG/1
1 TABLET ORAL ONCE
Qty: 0 | Refills: 0 | Status: DISCONTINUED | OUTPATIENT
Start: 2017-12-21 | End: 2017-12-21

## 2017-12-21 RX ORDER — KETOROLAC TROMETHAMINE 30 MG/ML
15 SYRINGE (ML) INJECTION ONCE
Qty: 0 | Refills: 0 | Status: DISCONTINUED | OUTPATIENT
Start: 2017-12-21 | End: 2017-12-21

## 2017-12-21 RX ORDER — SODIUM CHLORIDE 9 MG/ML
250 INJECTION INTRAMUSCULAR; INTRAVENOUS; SUBCUTANEOUS ONCE
Qty: 0 | Refills: 0 | Status: COMPLETED | OUTPATIENT
Start: 2017-12-21 | End: 2017-12-21

## 2017-12-21 RX ADMIN — OXYCODONE AND ACETAMINOPHEN 1 TABLET(S): 5; 325 TABLET ORAL at 14:58

## 2017-12-21 RX ADMIN — Medication 15 MILLIGRAM(S): at 14:00

## 2017-12-21 RX ADMIN — Medication 650 MILLIGRAM(S): at 10:13

## 2017-12-21 RX ADMIN — Medication 650 MILLIGRAM(S): at 11:10

## 2017-12-21 RX ADMIN — Medication 15 MILLIGRAM(S): at 13:22

## 2017-12-21 RX ADMIN — SODIUM CHLORIDE 750 MILLILITER(S): 9 INJECTION INTRAMUSCULAR; INTRAVENOUS; SUBCUTANEOUS at 11:10

## 2017-12-21 NOTE — ED PROVIDER NOTE - OBJECTIVE STATEMENT
89 y/o female hx t12 fracture with chronic back pain on tramadol (recent admission for pain control), afib (no a/c ?paroxysmal), htn, hld, c/o her typical back pain. Doesn't always need tramadol, took one 1 hour PTA but still with pain. no retention/incontinence, no paresthesias or weakness. Pt ambulates with cane.    ROS: No fever/chills. No eye pain/changes in vision, No ear pain/sore throat/dysphagia, No chest pain/palpitations. No SOB/cough/. No abdominal pain, N/V/D, no black/bloody bm. No dysuria/frequency/discharge, No headache. No Dizziness.    No rashes or breaks in skin. No numbness/tingling/weakness.

## 2017-12-21 NOTE — ED PROVIDER NOTE - PROGRESS NOTE DETAILS
Faviola: pt started to feel better before any pain meds (tramadol from home likely kicking in), given some tylenol, feels significant improvement in pain. feels comfortable going home, no red flags. will continue tramadol with tylenol prn. f/u pcp. Faviola: pt initial bp 96/62, on prior charts usually ~100/60 so normal, repeat near discharge was ~82/55, asymptomatic, only 50kg total so likely about normal, will give 250ml, reassess. likely still to d/c. pt feeling well, bp 98/68, no symptoms, back pain improved, spoke to pt's daughter in law, she or her  will  pt, they live with her, stable for d/c. Faviola: pt feeling well, bp 98/68, no symptoms, back pain improved, spoke to pt's daughter in law, she or her  will  pt, they live with her, stable for d/c. Faviola: pt awaiting , pain started to come back, will give percocet. Faviola: pt awaiting , pain started to come back, will give percocet. pt stating that her pain is the same but she is having higher pain/ttp in thoracic region, will get xray as well. if fracture likely nothing to do. Faviola: pt with multiple likely chronic/subacute compression fractures, no neuro symptoms, pain improved, bp normal and no symptoms. stable for d/c with continued pain control and f/u pcp.

## 2017-12-21 NOTE — ED PROVIDER NOTE - PSH
H/O cardiac pacemaker  [employer] placed 12/10  S/P Colonoscopic Polypectomy  15 years ago per pt  S/P TKR (total knee replacement)  Right TKR in 5/16

## 2017-12-21 NOTE — ED PROVIDER NOTE - MEDICAL DECISION MAKING DETAILS
likely chronic lower back pain, no acute trauma/changes. labs, pain control, reassess. chronic lower back pain, no acute trauma/changes. neuro intact. no urinary symptoms. labs, pain control, reassess. likely f/u pcp for continued pain management, already on tramadol and robaxin at home

## 2017-12-21 NOTE — ED ADULT NURSE REASSESSMENT NOTE - NS ED NURSE REASSESS COMMENT FT1
pt awake, alert no c/o at this time, bp 82/57, hr- 60, md made aware, ns 250ml started, will reassess

## 2017-12-21 NOTE — ED ADULT NURSE NOTE - NS ED NURSE DC INFO COMPLEXITY
Simple: Patient demonstrates quick and easy understanding/Verbalized Understanding Verbalized Understanding/Patient asked questions/Simple: Patient demonstrates quick and easy understanding

## 2017-12-21 NOTE — ED ADULT NURSE NOTE - PSH
H/O cardiac pacemaker  [employer] placed 12/10  S/P Colonoscopic Polypectomy  15 years ago per pt  S/P TKR (total knee replacement)  Right TKR in 5/16

## 2017-12-22 DIAGNOSIS — Z95.0 PRESENCE OF CARDIAC PACEMAKER: ICD-10-CM

## 2017-12-22 DIAGNOSIS — E03.9 HYPOTHYROIDISM, UNSPECIFIED: ICD-10-CM

## 2017-12-22 DIAGNOSIS — M15.9 POLYOSTEOARTHRITIS, UNSPECIFIED: ICD-10-CM

## 2017-12-22 DIAGNOSIS — M54.9 DORSALGIA, UNSPECIFIED: ICD-10-CM

## 2017-12-22 DIAGNOSIS — Z79.1 LONG TERM (CURRENT) USE OF NON-STEROIDAL ANTI-INFLAMMATORIES (NSAID): ICD-10-CM

## 2017-12-22 DIAGNOSIS — Z96.651 PRESENCE OF RIGHT ARTIFICIAL KNEE JOINT: ICD-10-CM

## 2017-12-22 DIAGNOSIS — Z88.9 ALLERGY STATUS TO UNSPECIFIED DRUGS, MEDICAMENTS AND BIOLOGICAL SUBSTANCES: ICD-10-CM

## 2017-12-22 DIAGNOSIS — I48.91 UNSPECIFIED ATRIAL FIBRILLATION: ICD-10-CM

## 2017-12-22 DIAGNOSIS — Z79.82 LONG TERM (CURRENT) USE OF ASPIRIN: ICD-10-CM

## 2017-12-22 DIAGNOSIS — I10 ESSENTIAL (PRIMARY) HYPERTENSION: ICD-10-CM

## 2017-12-22 DIAGNOSIS — B02.9 ZOSTER WITHOUT COMPLICATIONS: ICD-10-CM

## 2017-12-22 DIAGNOSIS — E78.00 PURE HYPERCHOLESTEROLEMIA, UNSPECIFIED: ICD-10-CM

## 2017-12-25 ENCOUNTER — INPATIENT (INPATIENT)
Facility: HOSPITAL | Age: 82
LOS: 2 days | Discharge: HOME HEALTH | End: 2017-12-28
Attending: INTERNAL MEDICINE | Admitting: INTERNAL MEDICINE
Payer: MEDICARE

## 2017-12-25 VITALS
HEART RATE: 111 BPM | RESPIRATION RATE: 20 BRPM | DIASTOLIC BLOOD PRESSURE: 94 MMHG | TEMPERATURE: 98 F | SYSTOLIC BLOOD PRESSURE: 145 MMHG | WEIGHT: 108.91 LBS | HEIGHT: 65 IN | OXYGEN SATURATION: 97 %

## 2017-12-25 DIAGNOSIS — E03.9 HYPOTHYROIDISM, UNSPECIFIED: ICD-10-CM

## 2017-12-25 DIAGNOSIS — M54.42 LUMBAGO WITH SCIATICA, LEFT SIDE: ICD-10-CM

## 2017-12-25 DIAGNOSIS — Z96.659 PRESENCE OF UNSPECIFIED ARTIFICIAL KNEE JOINT: Chronic | ICD-10-CM

## 2017-12-25 DIAGNOSIS — I10 ESSENTIAL (PRIMARY) HYPERTENSION: ICD-10-CM

## 2017-12-25 DIAGNOSIS — Z95.0 PRESENCE OF CARDIAC PACEMAKER: Chronic | ICD-10-CM

## 2017-12-25 DIAGNOSIS — S22.000G WEDGE COMPRESSION FRACTURE OF UNSPECIFIED THORACIC VERTEBRA, SUBSEQUENT ENCOUNTER FOR FRACTURE WITH DELAYED HEALING: ICD-10-CM

## 2017-12-25 LAB
ALBUMIN SERPL ELPH-MCNC: 3.1 G/DL — LOW (ref 3.3–5)
ALP SERPL-CCNC: 40 U/L — SIGNIFICANT CHANGE UP (ref 40–120)
ALT FLD-CCNC: 23 U/L — SIGNIFICANT CHANGE UP (ref 12–78)
ANION GAP SERPL CALC-SCNC: 9 MMOL/L — SIGNIFICANT CHANGE UP (ref 5–17)
AST SERPL-CCNC: 24 U/L — SIGNIFICANT CHANGE UP (ref 15–37)
BASOPHILS # BLD AUTO: 0 K/UL — SIGNIFICANT CHANGE UP (ref 0–0.2)
BASOPHILS NFR BLD AUTO: 0.7 % — SIGNIFICANT CHANGE UP (ref 0–2)
BILIRUB SERPL-MCNC: 1.7 MG/DL — HIGH (ref 0.2–1.2)
BUN SERPL-MCNC: 12 MG/DL — SIGNIFICANT CHANGE UP (ref 7–23)
CALCIUM SERPL-MCNC: 8.1 MG/DL — LOW (ref 8.5–10.1)
CHLORIDE SERPL-SCNC: 104 MMOL/L — SIGNIFICANT CHANGE UP (ref 96–108)
CO2 SERPL-SCNC: 27 MMOL/L — SIGNIFICANT CHANGE UP (ref 22–31)
CREAT SERPL-MCNC: 0.48 MG/DL — LOW (ref 0.5–1.3)
EOSINOPHIL # BLD AUTO: 0 K/UL — SIGNIFICANT CHANGE UP (ref 0–0.5)
EOSINOPHIL NFR BLD AUTO: 0.1 % — SIGNIFICANT CHANGE UP (ref 0–6)
GLUCOSE SERPL-MCNC: 90 MG/DL — SIGNIFICANT CHANGE UP (ref 70–99)
HCT VFR BLD CALC: 40.5 % — SIGNIFICANT CHANGE UP (ref 34.5–45)
HGB BLD-MCNC: 13.7 G/DL — SIGNIFICANT CHANGE UP (ref 11.5–15.5)
LYMPHOCYTES # BLD AUTO: 0.9 K/UL — LOW (ref 1–3.3)
LYMPHOCYTES # BLD AUTO: 13.4 % — SIGNIFICANT CHANGE UP (ref 13–44)
MCHC RBC-ENTMCNC: 33.9 GM/DL — SIGNIFICANT CHANGE UP (ref 32–36)
MCHC RBC-ENTMCNC: 34.1 PG — HIGH (ref 27–34)
MCV RBC AUTO: 100.6 FL — HIGH (ref 80–100)
MONOCYTES # BLD AUTO: 0.3 K/UL — SIGNIFICANT CHANGE UP (ref 0–0.9)
MONOCYTES NFR BLD AUTO: 4.7 % — SIGNIFICANT CHANGE UP (ref 2–14)
NEUTROPHILS # BLD AUTO: 5.6 K/UL — SIGNIFICANT CHANGE UP (ref 1.8–7.4)
NEUTROPHILS NFR BLD AUTO: 81 % — HIGH (ref 43–77)
PLATELET # BLD AUTO: 140 K/UL — LOW (ref 150–400)
POTASSIUM SERPL-MCNC: 3.9 MMOL/L — SIGNIFICANT CHANGE UP (ref 3.5–5.3)
POTASSIUM SERPL-SCNC: 3.9 MMOL/L — SIGNIFICANT CHANGE UP (ref 3.5–5.3)
PROT SERPL-MCNC: 6.1 GM/DL — SIGNIFICANT CHANGE UP (ref 6–8.3)
RBC # BLD: 4.02 M/UL — SIGNIFICANT CHANGE UP (ref 3.8–5.2)
RBC # FLD: 13.3 % — SIGNIFICANT CHANGE UP (ref 11–15)
SODIUM SERPL-SCNC: 140 MMOL/L — SIGNIFICANT CHANGE UP (ref 135–145)
WBC # BLD: 6.9 K/UL — SIGNIFICANT CHANGE UP (ref 3.8–10.5)
WBC # FLD AUTO: 6.9 K/UL — SIGNIFICANT CHANGE UP (ref 3.8–10.5)

## 2017-12-25 PROCEDURE — 70450 CT HEAD/BRAIN W/O DYE: CPT | Mod: 26

## 2017-12-25 PROCEDURE — 72131 CT LUMBAR SPINE W/O DYE: CPT | Mod: 26

## 2017-12-25 PROCEDURE — 76377 3D RENDER W/INTRP POSTPROCES: CPT | Mod: 26

## 2017-12-25 PROCEDURE — 72125 CT NECK SPINE W/O DYE: CPT | Mod: 26

## 2017-12-25 PROCEDURE — 99285 EMERGENCY DEPT VISIT HI MDM: CPT

## 2017-12-25 PROCEDURE — 72170 X-RAY EXAM OF PELVIS: CPT | Mod: 26

## 2017-12-25 RX ORDER — MORPHINE SULFATE 50 MG/1
4 CAPSULE, EXTENDED RELEASE ORAL ONCE
Qty: 0 | Refills: 0 | Status: DISCONTINUED | OUTPATIENT
Start: 2017-12-25 | End: 2017-12-25

## 2017-12-25 RX ORDER — METOPROLOL TARTRATE 50 MG
25 TABLET ORAL DAILY
Qty: 0 | Refills: 0 | Status: DISCONTINUED | OUTPATIENT
Start: 2017-12-25 | End: 2017-12-28

## 2017-12-25 RX ORDER — SIMVASTATIN 20 MG/1
20 TABLET, FILM COATED ORAL AT BEDTIME
Qty: 0 | Refills: 0 | Status: DISCONTINUED | OUTPATIENT
Start: 2017-12-25 | End: 2017-12-28

## 2017-12-25 RX ORDER — DOCUSATE SODIUM 100 MG
100 CAPSULE ORAL
Qty: 0 | Refills: 0 | Status: DISCONTINUED | OUTPATIENT
Start: 2017-12-25 | End: 2017-12-28

## 2017-12-25 RX ORDER — CYCLOBENZAPRINE HYDROCHLORIDE 10 MG/1
10 TABLET, FILM COATED ORAL ONCE
Qty: 0 | Refills: 0 | Status: COMPLETED | OUTPATIENT
Start: 2017-12-25 | End: 2017-12-25

## 2017-12-25 RX ORDER — DIAZEPAM 5 MG
5 TABLET ORAL ONCE
Qty: 0 | Refills: 0 | Status: DISCONTINUED | OUTPATIENT
Start: 2017-12-25 | End: 2017-12-25

## 2017-12-25 RX ORDER — ASPIRIN/CALCIUM CARB/MAGNESIUM 324 MG
81 TABLET ORAL DAILY
Qty: 0 | Refills: 0 | Status: DISCONTINUED | OUTPATIENT
Start: 2017-12-25 | End: 2017-12-28

## 2017-12-25 RX ORDER — OXYCODONE AND ACETAMINOPHEN 5; 325 MG/1; MG/1
1 TABLET ORAL ONCE
Qty: 0 | Refills: 0 | Status: DISCONTINUED | OUTPATIENT
Start: 2017-12-25 | End: 2017-12-25

## 2017-12-25 RX ORDER — CYCLOBENZAPRINE HYDROCHLORIDE 10 MG/1
10 TABLET, FILM COATED ORAL THREE TIMES A DAY
Qty: 0 | Refills: 0 | Status: DISCONTINUED | OUTPATIENT
Start: 2017-12-25 | End: 2017-12-28

## 2017-12-25 RX ORDER — MORPHINE SULFATE 50 MG/1
2 CAPSULE, EXTENDED RELEASE ORAL EVERY 4 HOURS
Qty: 0 | Refills: 0 | Status: DISCONTINUED | OUTPATIENT
Start: 2017-12-25 | End: 2017-12-27

## 2017-12-25 RX ORDER — AMIODARONE HYDROCHLORIDE 400 MG/1
200 TABLET ORAL DAILY
Qty: 0 | Refills: 0 | Status: DISCONTINUED | OUTPATIENT
Start: 2017-12-25 | End: 2017-12-28

## 2017-12-25 RX ORDER — KETOROLAC TROMETHAMINE 30 MG/ML
15 SYRINGE (ML) INJECTION
Qty: 0 | Refills: 0 | Status: DISCONTINUED | OUTPATIENT
Start: 2017-12-25 | End: 2017-12-28

## 2017-12-25 RX ORDER — LEVOTHYROXINE SODIUM 125 MCG
50 TABLET ORAL DAILY
Qty: 0 | Refills: 0 | Status: DISCONTINUED | OUTPATIENT
Start: 2017-12-25 | End: 2017-12-28

## 2017-12-25 RX ORDER — PANTOPRAZOLE SODIUM 20 MG/1
40 TABLET, DELAYED RELEASE ORAL
Qty: 0 | Refills: 0 | Status: DISCONTINUED | OUTPATIENT
Start: 2017-12-25 | End: 2017-12-28

## 2017-12-25 RX ADMIN — OXYCODONE AND ACETAMINOPHEN 1 TABLET(S): 5; 325 TABLET ORAL at 03:09

## 2017-12-25 RX ADMIN — AMIODARONE HYDROCHLORIDE 200 MILLIGRAM(S): 400 TABLET ORAL at 14:23

## 2017-12-25 RX ADMIN — CYCLOBENZAPRINE HYDROCHLORIDE 10 MILLIGRAM(S): 10 TABLET, FILM COATED ORAL at 14:23

## 2017-12-25 RX ADMIN — OXYCODONE AND ACETAMINOPHEN 1 TABLET(S): 5; 325 TABLET ORAL at 11:23

## 2017-12-25 RX ADMIN — MORPHINE SULFATE 4 MILLIGRAM(S): 50 CAPSULE, EXTENDED RELEASE ORAL at 05:21

## 2017-12-25 RX ADMIN — SIMVASTATIN 20 MILLIGRAM(S): 20 TABLET, FILM COATED ORAL at 22:41

## 2017-12-25 RX ADMIN — OXYCODONE AND ACETAMINOPHEN 1 TABLET(S): 5; 325 TABLET ORAL at 10:23

## 2017-12-25 RX ADMIN — MORPHINE SULFATE 4 MILLIGRAM(S): 50 CAPSULE, EXTENDED RELEASE ORAL at 04:04

## 2017-12-25 RX ADMIN — CYCLOBENZAPRINE HYDROCHLORIDE 10 MILLIGRAM(S): 10 TABLET, FILM COATED ORAL at 01:27

## 2017-12-25 RX ADMIN — CYCLOBENZAPRINE HYDROCHLORIDE 10 MILLIGRAM(S): 10 TABLET, FILM COATED ORAL at 22:41

## 2017-12-25 RX ADMIN — Medication 81 MILLIGRAM(S): at 14:24

## 2017-12-25 RX ADMIN — Medication 25 MILLIGRAM(S): at 14:23

## 2017-12-25 RX ADMIN — Medication 100 MILLIGRAM(S): at 17:30

## 2017-12-25 RX ADMIN — Medication 15 MILLIGRAM(S): at 17:30

## 2017-12-25 RX ADMIN — MORPHINE SULFATE 2 MILLIGRAM(S): 50 CAPSULE, EXTENDED RELEASE ORAL at 16:49

## 2017-12-25 RX ADMIN — Medication 15 MILLIGRAM(S): at 17:45

## 2017-12-25 RX ADMIN — OXYCODONE AND ACETAMINOPHEN 1 TABLET(S): 5; 325 TABLET ORAL at 01:27

## 2017-12-25 RX ADMIN — MORPHINE SULFATE 2 MILLIGRAM(S): 50 CAPSULE, EXTENDED RELEASE ORAL at 08:45

## 2017-12-25 RX ADMIN — MORPHINE SULFATE 2 MILLIGRAM(S): 50 CAPSULE, EXTENDED RELEASE ORAL at 08:59

## 2017-12-25 RX ADMIN — MORPHINE SULFATE 4 MILLIGRAM(S): 50 CAPSULE, EXTENDED RELEASE ORAL at 05:58

## 2017-12-25 NOTE — ED ADULT NURSE NOTE - PSH
H/O cardiac pacemaker  [employer] placed 12/10  S/P Colonoscopic Polypectomy  15 years ago per pt  S/P TKR (total knee replacement)  Right TKR in 5/16

## 2017-12-25 NOTE — H&P ADULT - NSHPLABSRESULTS_GEN_ALL_CORE
13.7   6.9   )-----------( 140      ( 25 Dec 2017 04:13 )             40.5     12-25    140  |  104  |  12  ----------------------------<  90  3.9   |  27  |  0.48<L>    Ca    8.1<L>      25 Dec 2017 04:35    TPro  6.1  /  Alb  3.1<L>  /  TBili  1.7<H>  /  DBili  x   /  AST  24  /  ALT  23  /  AlkPhos  40  12-25

## 2017-12-25 NOTE — ED PROVIDER NOTE - PSH
H/O cardiac pacemaker  [employer] placed 12/10  S/P Colonoscopic Polypectomy  15 years ago per pt  S/P TKR (total knee replacement)  Right TKR in 5/16

## 2017-12-25 NOTE — ED ADULT NURSE NOTE - OBJECTIVE STATEMENT
Assumed care of patient at this time with complaint of fall at home today, pt sts she didn't fall to the floor, denies LOC or hitting head, pt on aspirin

## 2017-12-25 NOTE — H&P ADULT - HISTORY OF PRESENT ILLNESS
Pt is a 89 yo lady with a pmhx of HTN, HL, Afib, chronic back pain on tramadol who presents to the ED with back pain after fall.

## 2017-12-25 NOTE — H&P ADULT - PSH
H/O cardiac pacemaker  [employer] placed 12/10  S/P Colonoscopic Polypectomy  15 years ago per pt  S/P TKR (total knee replacement)  Right TKR in 5/16

## 2017-12-25 NOTE — ED PROVIDER NOTE - OBJECTIVE STATEMENT
Pt is a 87 yo lady with a pmhx of HTN, HL, Afib, chronic back pain on tramadol who presents to the ED with back pain after fall. She says she fell out of a chair and hit her head, no loc. Has diffuse back pain, no loss of sensation, no weakness. Has had prior admissions for pain control in the past. No hip pain, no shortened leg.

## 2017-12-26 ENCOUNTER — TRANSCRIPTION ENCOUNTER (OUTPATIENT)
Age: 82
End: 2017-12-26

## 2017-12-26 RX ADMIN — SIMVASTATIN 20 MILLIGRAM(S): 20 TABLET, FILM COATED ORAL at 21:02

## 2017-12-26 RX ADMIN — MORPHINE SULFATE 2 MILLIGRAM(S): 50 CAPSULE, EXTENDED RELEASE ORAL at 11:47

## 2017-12-26 RX ADMIN — AMIODARONE HYDROCHLORIDE 200 MILLIGRAM(S): 400 TABLET ORAL at 05:08

## 2017-12-26 RX ADMIN — Medication 15 MILLIGRAM(S): at 17:27

## 2017-12-26 RX ADMIN — MORPHINE SULFATE 2 MILLIGRAM(S): 50 CAPSULE, EXTENDED RELEASE ORAL at 12:08

## 2017-12-26 RX ADMIN — Medication 100 MILLIGRAM(S): at 17:09

## 2017-12-26 RX ADMIN — Medication 15 MILLIGRAM(S): at 06:08

## 2017-12-26 RX ADMIN — CYCLOBENZAPRINE HYDROCHLORIDE 10 MILLIGRAM(S): 10 TABLET, FILM COATED ORAL at 05:07

## 2017-12-26 RX ADMIN — Medication 15 MILLIGRAM(S): at 17:09

## 2017-12-26 RX ADMIN — Medication 81 MILLIGRAM(S): at 11:09

## 2017-12-26 RX ADMIN — CYCLOBENZAPRINE HYDROCHLORIDE 10 MILLIGRAM(S): 10 TABLET, FILM COATED ORAL at 21:02

## 2017-12-26 RX ADMIN — PANTOPRAZOLE SODIUM 40 MILLIGRAM(S): 20 TABLET, DELAYED RELEASE ORAL at 07:43

## 2017-12-26 RX ADMIN — Medication 25 MILLIGRAM(S): at 05:07

## 2017-12-26 RX ADMIN — Medication 15 MILLIGRAM(S): at 05:08

## 2017-12-26 RX ADMIN — CYCLOBENZAPRINE HYDROCHLORIDE 10 MILLIGRAM(S): 10 TABLET, FILM COATED ORAL at 13:16

## 2017-12-26 RX ADMIN — Medication 50 MICROGRAM(S): at 05:08

## 2017-12-26 NOTE — PHYSICAL THERAPY INITIAL EVALUATION ADULT - MODIFIED CLINICAL TEST OF SENSORY INTEGRATION IN BALANCE TEST
Barthel Index: Feeding Score _10/10__, Bathing Score _0/5__, Grooming Score _0/5__, Dressing Score _5/10__, Bowels Score __10/10_, Bladder Score _10/10__, Toilet Score _5/10__, Transfers Score _10/15__, Mobility Score __0/15_, Stairs Score _0/10__,     Total Score ___50/100

## 2017-12-26 NOTE — DISCHARGE NOTE ADULT - HOSPITAL COURSE
Pt is a 87 yo lady with a pmhx of HTN, HL, Afib, chronic back pain on tramadol who presents to the ED with back pain after fall.    Pt stable for discharge to [facility] w/ outpt follow up.

## 2017-12-26 NOTE — DISCHARGE NOTE ADULT - CARE PLAN
Principal Discharge DX:	Chronic bilateral low back pain with bilateral sciatica  Goal:	Continue pain management  Instructions for follow-up, activity and diet:	For CATY  Secondary Diagnosis:	Essential hypertension  Instructions for follow-up, activity and diet:	Continue home blood pressure medications  Follow up with PCP  Low-sodium diet  AHA Recipes - [email or web address]  Secondary Diagnosis:	Closed compression fracture of thoracic vertebra with delayed healing, subsequent encounter  Instructions for follow-up, activity and diet:	For CATY  Secondary Diagnosis:	Hypothyroidism, unspecified type  Instructions for follow-up, activity and diet:	Continue home meds

## 2017-12-26 NOTE — PHYSICAL THERAPY INITIAL EVALUATION ADULT - IMPAIRMENTS FOUND, PT EVAL
muscle strength/poor safety awareness/aerobic capacity/endurance/bed mobility, transfers/gait, locomotion, and balance

## 2017-12-26 NOTE — PHYSICAL THERAPY INITIAL EVALUATION ADULT - PERTINENT HX OF CURRENT PROBLEM, REHAB EVAL
Pt is an 89yo F admitted s/p fall with back pain. Imaging revealed + diffuse osteopenia & multi-level compression fracture deformities of the L/S. All other imaging negative.

## 2017-12-26 NOTE — DISCHARGE NOTE ADULT - CARE PROVIDER_API CALL
Guido Sanderson (DO), Internal Medicine  [address] [place] [address]  Phone: [phone]  Fax: [phone]

## 2017-12-26 NOTE — PROGRESS NOTE ADULT - SUBJECTIVE AND OBJECTIVE BOX
INTERVAL HPI/OVERNIGHT EVENTS:  admitted yesterday with back pain following a fall    Vital Signs Last 24 Hrs  T(C): 36.6 (26 Dec 2017 17:19), Max: 36.6 (26 Dec 2017 05:00)  T(F): 97.8 (26 Dec 2017 17:19), Max: 97.9 (26 Dec 2017 05:00)  HR: 63 (26 Dec 2017 17:19) (60 - 63)  BP: 112/75 (26 Dec 2017 17:19) (88/57 - 118/54)  BP(mean): --  RR: 17 (26 Dec 2017 17:19) (16 - 18)  SpO2: 98% (26 Dec 2017 17:19) (94% - 98%)        PHYSICAL EXAM:  GEN:         Awake, responsive and comfortable.  HEENT:    Normal.    RESP:             clear  CVS:             Regular rate and rhythm.   ABD:         Soft, non-tender, non-distended;   :             No costovertebral angle tenderness  EXTR:            No clubbing, cyanosis or edema  CNS:              Intact sensory and motor function.        MEDICATIONS  (STANDING):  amiodarone    Tablet 200 milliGRAM(s) Oral daily  aspirin enteric coated 81 milliGRAM(s) Oral daily  cyclobenzaprine 10 milliGRAM(s) Oral three times a day  docusate sodium 100 milliGRAM(s) Oral two times a day  ketorolac   Injectable 15 milliGRAM(s) IV Push two times a day  levothyroxine 50 MICROGram(s) Oral daily  metoprolol succinate ER 25 milliGRAM(s) Oral daily  pantoprazole    Tablet 40 milliGRAM(s) Oral before breakfast  simvastatin 20 milliGRAM(s) Oral at bedtime    MEDICATIONS  (PRN):  morphine  - Injectable 2 milliGRAM(s) IV Push every 4 hours PRN Moderate Pain (4 - 6)        LABS:                        13.7   6.9   )-----------( 140      ( 25 Dec 2017 04:13 )             40.5     12-25    140  |  104  |  12  ----------------------------<  90  3.9   |  27  |  0.48<L>    Ca    8.1<L>      25 Dec 2017 04:35    TPro  6.1  /  Alb  3.1<L>  /  TBili  1.7<H>  /  DBili  x   /  AST  24  /  ALT  23  /  AlkPhos  40  12-25              RADIOLOGY & ADDITIONAL STUDIES: radiology studies noted    ASSESSMENT AND PLAN: discharge planning

## 2017-12-26 NOTE — PHYSICAL THERAPY INITIAL EVALUATION ADULT - TRANSFER SAFETY CONCERNS NOTED: SIT/STAND, REHAB EVAL
decreased safety awareness/decreased sequencing ability/decreased weight-shifting ability/decreased step length

## 2017-12-26 NOTE — DISCHARGE NOTE ADULT - PATIENT PORTAL LINK FT
“You can access the [employer] Patient Portal, offered by [facility], by registering with the following website: [email or web address]”

## 2017-12-26 NOTE — DISCHARGE NOTE ADULT - PLAN OF CARE
Continue pain management For CATY Continue home blood pressure medications  Follow up with PCP  Low-sodium diet  AHA Recipes - [email or web address] Continue home meds

## 2017-12-26 NOTE — DISCHARGE NOTE ADULT - MEDICATION SUMMARY - MEDICATIONS TO TAKE
I will START or STAY ON the medications listed below when I get home from the hospital:     mg oral tablet  -- 1 tab(s) by mouth every 8 hours as needed for pain  -- Do not take this drug if you are pregnant.  It is very important that you take or use this exactly as directed.  Do not skip doses or discontinue unless directed by your doctor.  May cause drowsiness or dizziness.  Obtain medical advice before taking any non-prescription drugs as some may affect the action of this medication.  Take with food or milk.    -- Indication: For Pain    traMADol 50 mg oral tablet  -- 1 tab(s) by mouth every 8 hours, As Needed  - for moderate pain MDD:3  -- Indication: For Pain    aspirin 81 mg oral delayed release tablet  -- 1 tab(s) by mouth once a day  -- Indication: For Preventative    oxyCODONE-acetaminophen 5 mg-325 mg oral tablet  -- 1 tab(s) by mouth every 6 hours, As needed, Severe Pain (7 - 10)  -- Indication: For Pain    amiodarone 200 mg oral tablet  -- 1 tab(s) by mouth once a day  -- Indication: For Dysrrhythmia    simvastatin 20 mg oral tablet  -- 1 tab(s) by mouth once a day (at bedtime)  -- Indication: For HLD    metoprolol succinate 25 mg oral tablet, extended release  -- 1 tab(s) by mouth once a day  -- Indication: For HTN    docusate sodium 100 mg oral capsule  -- 1 cap(s) by mouth 2 times a day  -- Indication: For Constipation    methocarbamol 750 mg oral tablet  -- 1 tab(s) by mouth 3 times a day  -- May cause drowsiness.  Alcohol may intensify this effect.  Use care when operating dangerous machinery.    -- Indication: For Muscle relaxant    esomeprazole 40 mg oral delayed release capsule  -- 1 cap(s) by mouth once a day  -- It is very important that you take or use this exactly as directed.  Do not skip doses or discontinue unless directed by your doctor.  Obtain medical advice before taking any non-prescription drugs as some may affect the action of this medication.  Swallow whole.  Do not crush.  Take medication on an empty stomach 1 hour before or 2 to 3 hours after a meal unless otherwise directed by your doctor.    -- Indication: For GERD    levothyroxine 50 mcg (0.05 mg) oral tablet  -- 1 tab(s) by mouth once a day  -- Indication: For Hypothyroidism, unspecified type I will START or STAY ON the medications listed below when I get home from the hospital:     mg oral tablet  -- 1 tab(s) by mouth every 8 hours as needed for pain  -- Do not take this drug if you are pregnant.  It is very important that you take or use this exactly as directed.  Do not skip doses or discontinue unless directed by your doctor.  May cause drowsiness or dizziness.  Obtain medical advice before taking any non-prescription drugs as some may affect the action of this medication.  Take with food or milk.    -- Indication: For Pain    traMADol 50 mg oral tablet  -- 1 tab(s) by mouth every 8 hours, As Needed  - for moderate pain MDD:3  -- Indication: For Pain    oxyCODONE-acetaminophen 5 mg-325 mg oral tablet  -- 1 tab(s) by mouth every 6 hours, As needed, Severe Pain (7 - 10) MDD:4  -- Indication: For Pain    aspirin 81 mg oral delayed release tablet  -- 1 tab(s) by mouth once a day  -- Indication: For Preventative    amiodarone 200 mg oral tablet  -- 1 tab(s) by mouth once a day  -- Indication: For Dysrrhythmia    simvastatin 20 mg oral tablet  -- 1 tab(s) by mouth once a day (at bedtime)  -- Indication: For HLD    metoprolol succinate 25 mg oral tablet, extended release  -- 1 tab(s) by mouth once a day  -- Indication: For HTN    docusate sodium 100 mg oral capsule  -- 1 cap(s) by mouth 2 times a day  -- Indication: For Constipation    methocarbamol 750 mg oral tablet  -- 1 tab(s) by mouth 3 times a day  -- May cause drowsiness.  Alcohol may intensify this effect.  Use care when operating dangerous machinery.    -- Indication: For Muscle relaxant    esomeprazole 40 mg oral delayed release capsule  -- 1 cap(s) by mouth once a day  -- It is very important that you take or use this exactly as directed.  Do not skip doses or discontinue unless directed by your doctor.  Obtain medical advice before taking any non-prescription drugs as some may affect the action of this medication.  Swallow whole.  Do not crush.  Take medication on an empty stomach 1 hour before or 2 to 3 hours after a meal unless otherwise directed by your doctor.    -- Indication: For GERD    levothyroxine 50 mcg (0.05 mg) oral tablet  -- 1 tab(s) by mouth once a day  -- Indication: For Hypothyroidism, unspecified type

## 2017-12-26 NOTE — PHYSICAL THERAPY INITIAL EVALUATION ADULT - ADDITIONAL COMMENTS
PT attempted to contact pt's daughter Tracy ([phone]) in order to obtain social history/prior level of function since pt is confused (no answer). Per pt report she lives alone in a private home, + stairs to negotiate (unsure of #, + railing). Pt states prior to admission she was independent with all functional mobility including household ambulation with straight cane. Pt denies owning a rolling walker. Pt states she is right hand dominant & goal of therapy is to manage pain.

## 2017-12-26 NOTE — DISCHARGE NOTE ADULT - SECONDARY DIAGNOSIS.
Essential hypertension Closed compression fracture of thoracic vertebra with delayed healing, subsequent encounter Hypothyroidism, unspecified type

## 2017-12-27 RX ORDER — OXYCODONE AND ACETAMINOPHEN 5; 325 MG/1; MG/1
1 TABLET ORAL EVERY 6 HOURS
Qty: 0 | Refills: 0 | Status: DISCONTINUED | OUTPATIENT
Start: 2017-12-27 | End: 2017-12-28

## 2017-12-27 RX ADMIN — Medication 25 MILLIGRAM(S): at 06:23

## 2017-12-27 RX ADMIN — Medication 50 MICROGRAM(S): at 06:23

## 2017-12-27 RX ADMIN — Medication 81 MILLIGRAM(S): at 12:20

## 2017-12-27 RX ADMIN — MORPHINE SULFATE 2 MILLIGRAM(S): 50 CAPSULE, EXTENDED RELEASE ORAL at 03:21

## 2017-12-27 RX ADMIN — MORPHINE SULFATE 2 MILLIGRAM(S): 50 CAPSULE, EXTENDED RELEASE ORAL at 03:06

## 2017-12-27 RX ADMIN — Medication 100 MILLIGRAM(S): at 06:23

## 2017-12-27 RX ADMIN — SIMVASTATIN 20 MILLIGRAM(S): 20 TABLET, FILM COATED ORAL at 21:36

## 2017-12-27 RX ADMIN — AMIODARONE HYDROCHLORIDE 200 MILLIGRAM(S): 400 TABLET ORAL at 06:23

## 2017-12-27 RX ADMIN — Medication 15 MILLIGRAM(S): at 06:24

## 2017-12-27 RX ADMIN — CYCLOBENZAPRINE HYDROCHLORIDE 10 MILLIGRAM(S): 10 TABLET, FILM COATED ORAL at 13:23

## 2017-12-27 RX ADMIN — CYCLOBENZAPRINE HYDROCHLORIDE 10 MILLIGRAM(S): 10 TABLET, FILM COATED ORAL at 06:23

## 2017-12-27 RX ADMIN — CYCLOBENZAPRINE HYDROCHLORIDE 10 MILLIGRAM(S): 10 TABLET, FILM COATED ORAL at 21:36

## 2017-12-27 RX ADMIN — Medication 15 MILLIGRAM(S): at 18:08

## 2017-12-27 RX ADMIN — Medication 15 MILLIGRAM(S): at 06:39

## 2017-12-27 RX ADMIN — Medication 100 MILLIGRAM(S): at 18:08

## 2017-12-27 RX ADMIN — PANTOPRAZOLE SODIUM 40 MILLIGRAM(S): 20 TABLET, DELAYED RELEASE ORAL at 08:10

## 2017-12-27 NOTE — PROGRESS NOTE ADULT - SUBJECTIVE AND OBJECTIVE BOX
INTERVAL HPI/OVERNIGHT EVENTS:  still has some back pain. family thinks morphine is causing confusion.      Vital Signs Last 24 Hrs  T(C): 36.1 (27 Dec 2017 11:57), Max: 36.6 (26 Dec 2017 17:19)  T(F): 97 (27 Dec 2017 11:57), Max: 97.8 (26 Dec 2017 17:19)  HR: 72 (27 Dec 2017 14:35) (60 - 86)  BP: 100/70 (27 Dec 2017 14:35) (93/64 - 134/57)  BP(mean): --  RR: 18 (27 Dec 2017 14:35) (17 - 18)  SpO2: 92% (27 Dec 2017 14:35) (91% - 98%)        PHYSICAL EXAM:  GEN:         Awake, responsive and comfortable.  HEENT:    Normal.    RESP:   CVS:             Regular rate and rhythm.   ABD:         Soft, non-tender, non-distended;   :             No costovertebral angle tenderness  EXTR:            No clubbing, cyanosis or edema  CNS:              Intact sensory and motor function.        MEDICATIONS  (STANDING):  amiodarone    Tablet 200 milliGRAM(s) Oral daily  aspirin enteric coated 81 milliGRAM(s) Oral daily  cyclobenzaprine 10 milliGRAM(s) Oral three times a day  docusate sodium 100 milliGRAM(s) Oral two times a day  ketorolac   Injectable 15 milliGRAM(s) IV Push two times a day  levothyroxine 50 MICROGram(s) Oral daily  metoprolol succinate ER 25 milliGRAM(s) Oral daily  pantoprazole    Tablet 40 milliGRAM(s) Oral before breakfast  simvastatin 20 milliGRAM(s) Oral at bedtime    MEDICATIONS  (PRN):  oxyCODONE    5 mG/acetaminophen 325 mG 1 Tablet(s) Oral every 6 hours PRN Severe Pain (7 - 10)        LABS:                    RADIOLOGY & ADDITIONAL STUDIES:    ASSESSMENT AND PLAN: low back pain. to change to percoset 5/325 one q6h prn

## 2017-12-28 VITALS
TEMPERATURE: 97 F | SYSTOLIC BLOOD PRESSURE: 103 MMHG | HEART RATE: 61 BPM | OXYGEN SATURATION: 96 % | DIASTOLIC BLOOD PRESSURE: 65 MMHG | RESPIRATION RATE: 17 BRPM

## 2017-12-28 RX ORDER — METOPROLOL TARTRATE 50 MG
1 TABLET ORAL
Qty: 30 | Refills: 0
Start: 2017-12-28 | End: 2018-01-26

## 2017-12-28 RX ORDER — METHOCARBAMOL 500 MG/1
1 TABLET, FILM COATED ORAL
Qty: 30 | Refills: 0 | OUTPATIENT
Start: 2017-12-28 | End: 2018-01-06

## 2017-12-28 RX ORDER — DOCUSATE SODIUM 100 MG
1 CAPSULE ORAL
Qty: 60 | Refills: 0
Start: 2017-12-28 | End: 2018-01-26

## 2017-12-28 RX ORDER — SIMVASTATIN 20 MG/1
1 TABLET, FILM COATED ORAL
Qty: 30 | Refills: 0
Start: 2017-12-28 | End: 2018-01-26

## 2017-12-28 RX ORDER — ASPIRIN/CALCIUM CARB/MAGNESIUM 324 MG
1 TABLET ORAL
Qty: 30 | Refills: 0
Start: 2017-12-28 | End: 2018-01-26

## 2017-12-28 RX ORDER — ESOMEPRAZOLE MAGNESIUM 40 MG/1
1 CAPSULE, DELAYED RELEASE ORAL
Qty: 30 | Refills: 0
Start: 2017-12-28 | End: 2018-01-26

## 2017-12-28 RX ORDER — SIMVASTATIN 20 MG/1
1 TABLET, FILM COATED ORAL
Qty: 0 | Refills: 0 | COMMUNITY
Start: 2017-12-28

## 2017-12-28 RX ORDER — ASPIRIN/CALCIUM CARB/MAGNESIUM 324 MG
1 TABLET ORAL
Qty: 0 | Refills: 0 | COMMUNITY
Start: 2017-12-28

## 2017-12-28 RX ORDER — DOCUSATE SODIUM 100 MG
1 CAPSULE ORAL
Qty: 0 | Refills: 0 | COMMUNITY
Start: 2017-12-28

## 2017-12-28 RX ORDER — METOPROLOL TARTRATE 50 MG
1 TABLET ORAL
Qty: 0 | Refills: 0 | COMMUNITY
Start: 2017-12-28

## 2017-12-28 RX ORDER — KETOROLAC TROMETHAMINE 30 MG/ML
15 SYRINGE (ML) INJECTION ONCE
Qty: 0 | Refills: 0 | Status: DISCONTINUED | OUTPATIENT
Start: 2017-12-28 | End: 2017-12-28

## 2017-12-28 RX ORDER — AMIODARONE HYDROCHLORIDE 400 MG/1
1 TABLET ORAL
Qty: 30 | Refills: 0
Start: 2017-12-28 | End: 2018-01-26

## 2017-12-28 RX ORDER — LEVOTHYROXINE SODIUM 125 MCG
1 TABLET ORAL
Qty: 0 | Refills: 0 | DISCHARGE
Start: 2017-12-28

## 2017-12-28 RX ADMIN — Medication 100 MILLIGRAM(S): at 17:10

## 2017-12-28 RX ADMIN — Medication 15 MILLIGRAM(S): at 17:09

## 2017-12-28 RX ADMIN — CYCLOBENZAPRINE HYDROCHLORIDE 10 MILLIGRAM(S): 10 TABLET, FILM COATED ORAL at 13:17

## 2017-12-28 RX ADMIN — Medication 50 MICROGRAM(S): at 05:49

## 2017-12-28 RX ADMIN — CYCLOBENZAPRINE HYDROCHLORIDE 10 MILLIGRAM(S): 10 TABLET, FILM COATED ORAL at 05:49

## 2017-12-28 RX ADMIN — Medication 15 MILLIGRAM(S): at 04:37

## 2017-12-28 RX ADMIN — Medication 15 MILLIGRAM(S): at 04:22

## 2017-12-28 RX ADMIN — Medication 25 MILLIGRAM(S): at 05:50

## 2017-12-28 RX ADMIN — OXYCODONE AND ACETAMINOPHEN 1 TABLET(S): 5; 325 TABLET ORAL at 13:47

## 2017-12-28 RX ADMIN — OXYCODONE AND ACETAMINOPHEN 1 TABLET(S): 5; 325 TABLET ORAL at 13:17

## 2017-12-28 RX ADMIN — Medication 100 MILLIGRAM(S): at 05:49

## 2017-12-28 RX ADMIN — AMIODARONE HYDROCHLORIDE 200 MILLIGRAM(S): 400 TABLET ORAL at 05:50

## 2017-12-31 DIAGNOSIS — Z96.651 PRESENCE OF RIGHT ARTIFICIAL KNEE JOINT: ICD-10-CM

## 2017-12-31 DIAGNOSIS — S12.000G: ICD-10-CM

## 2017-12-31 DIAGNOSIS — Z88.4 ALLERGY STATUS TO ANESTHETIC AGENT: ICD-10-CM

## 2017-12-31 DIAGNOSIS — Z95.0 PRESENCE OF CARDIAC PACEMAKER: ICD-10-CM

## 2017-12-31 DIAGNOSIS — Z79.1 LONG TERM (CURRENT) USE OF NON-STEROIDAL ANTI-INFLAMMATORIES (NSAID): ICD-10-CM

## 2017-12-31 DIAGNOSIS — M54.9 DORSALGIA, UNSPECIFIED: ICD-10-CM

## 2017-12-31 DIAGNOSIS — Z79.891 LONG TERM (CURRENT) USE OF OPIATE ANALGESIC: ICD-10-CM

## 2017-12-31 DIAGNOSIS — I48.91 UNSPECIFIED ATRIAL FIBRILLATION: ICD-10-CM

## 2017-12-31 DIAGNOSIS — S12.400G: ICD-10-CM

## 2017-12-31 DIAGNOSIS — E03.9 HYPOTHYROIDISM, UNSPECIFIED: ICD-10-CM

## 2017-12-31 DIAGNOSIS — Z79.82 LONG TERM (CURRENT) USE OF ASPIRIN: ICD-10-CM

## 2017-12-31 DIAGNOSIS — S12.100G: ICD-10-CM

## 2017-12-31 DIAGNOSIS — I10 ESSENTIAL (PRIMARY) HYPERTENSION: ICD-10-CM

## 2017-12-31 DIAGNOSIS — M19.90 UNSPECIFIED OSTEOARTHRITIS, UNSPECIFIED SITE: ICD-10-CM

## 2017-12-31 DIAGNOSIS — M54.41 LUMBAGO WITH SCIATICA, RIGHT SIDE: ICD-10-CM

## 2017-12-31 DIAGNOSIS — E78.00 PURE HYPERCHOLESTEROLEMIA, UNSPECIFIED: ICD-10-CM

## 2017-12-31 DIAGNOSIS — M54.42 LUMBAGO WITH SCIATICA, LEFT SIDE: ICD-10-CM

## 2018-01-07 ENCOUNTER — INPATIENT (INPATIENT)
Facility: HOSPITAL | Age: 83
LOS: 4 days | Discharge: NURSING, REHAB OR LONG-TERM CARE | End: 2018-01-12
Attending: FAMILY MEDICINE | Admitting: FAMILY MEDICINE
Payer: MEDICARE

## 2018-01-07 VITALS
HEART RATE: 46 BPM | OXYGEN SATURATION: 95 % | HEIGHT: 65 IN | DIASTOLIC BLOOD PRESSURE: 85 MMHG | SYSTOLIC BLOOD PRESSURE: 141 MMHG | RESPIRATION RATE: 16 BRPM | WEIGHT: 108.91 LBS | TEMPERATURE: 98 F

## 2018-01-07 DIAGNOSIS — Z95.0 PRESENCE OF CARDIAC PACEMAKER: Chronic | ICD-10-CM

## 2018-01-07 DIAGNOSIS — Z96.659 PRESENCE OF UNSPECIFIED ARTIFICIAL KNEE JOINT: Chronic | ICD-10-CM

## 2018-01-07 LAB
ALBUMIN SERPL ELPH-MCNC: 3.2 G/DL — LOW (ref 3.3–5)
ALP SERPL-CCNC: 77 U/L — SIGNIFICANT CHANGE UP (ref 40–120)
ALT FLD-CCNC: 28 U/L — SIGNIFICANT CHANGE UP (ref 12–78)
ANION GAP SERPL CALC-SCNC: 8 MMOL/L — SIGNIFICANT CHANGE UP (ref 5–17)
APTT BLD: 36.8 SEC — SIGNIFICANT CHANGE UP (ref 27.5–37.4)
AST SERPL-CCNC: 24 U/L — SIGNIFICANT CHANGE UP (ref 15–37)
BASOPHILS # BLD AUTO: 0.1 K/UL — SIGNIFICANT CHANGE UP (ref 0–0.2)
BASOPHILS NFR BLD AUTO: 0.6 % — SIGNIFICANT CHANGE UP (ref 0–2)
BILIRUB SERPL-MCNC: 0.9 MG/DL — SIGNIFICANT CHANGE UP (ref 0.2–1.2)
BUN SERPL-MCNC: 10 MG/DL — SIGNIFICANT CHANGE UP (ref 7–23)
CALCIUM SERPL-MCNC: 8.4 MG/DL — LOW (ref 8.5–10.1)
CHLORIDE SERPL-SCNC: 103 MMOL/L — SIGNIFICANT CHANGE UP (ref 96–108)
CO2 SERPL-SCNC: 29 MMOL/L — SIGNIFICANT CHANGE UP (ref 22–31)
CREAT SERPL-MCNC: 0.57 MG/DL — SIGNIFICANT CHANGE UP (ref 0.5–1.3)
EOSINOPHIL # BLD AUTO: 0 K/UL — SIGNIFICANT CHANGE UP (ref 0–0.5)
EOSINOPHIL NFR BLD AUTO: 0.1 % — SIGNIFICANT CHANGE UP (ref 0–6)
GLUCOSE SERPL-MCNC: 108 MG/DL — HIGH (ref 70–99)
HCT VFR BLD CALC: 47.4 % — HIGH (ref 34.5–45)
HGB BLD-MCNC: 15.8 G/DL — HIGH (ref 11.5–15.5)
INR BLD: 1.23 RATIO — HIGH (ref 0.88–1.16)
LIDOCAIN IGE QN: 61 U/L — LOW (ref 73–393)
LYMPHOCYTES # BLD AUTO: 0.8 K/UL — LOW (ref 1–3.3)
LYMPHOCYTES # BLD AUTO: 8.8 % — LOW (ref 13–44)
MCHC RBC-ENTMCNC: 33.5 GM/DL — SIGNIFICANT CHANGE UP (ref 32–36)
MCHC RBC-ENTMCNC: 33.6 PG — SIGNIFICANT CHANGE UP (ref 27–34)
MCV RBC AUTO: 100.3 FL — HIGH (ref 80–100)
MONOCYTES # BLD AUTO: 0.4 K/UL — SIGNIFICANT CHANGE UP (ref 0–0.9)
MONOCYTES NFR BLD AUTO: 5.1 % — SIGNIFICANT CHANGE UP (ref 2–14)
NEUTROPHILS # BLD AUTO: 7.4 K/UL — SIGNIFICANT CHANGE UP (ref 1.8–7.4)
NEUTROPHILS NFR BLD AUTO: 85.3 % — HIGH (ref 43–77)
PLATELET # BLD AUTO: 175 K/UL — SIGNIFICANT CHANGE UP (ref 150–400)
POTASSIUM SERPL-MCNC: 4.2 MMOL/L — SIGNIFICANT CHANGE UP (ref 3.5–5.3)
POTASSIUM SERPL-SCNC: 4.2 MMOL/L — SIGNIFICANT CHANGE UP (ref 3.5–5.3)
PROT SERPL-MCNC: 7 GM/DL — SIGNIFICANT CHANGE UP (ref 6–8.3)
PROTHROM AB SERPL-ACNC: 13.5 SEC — HIGH (ref 9.8–12.7)
RBC # BLD: 4.72 M/UL — SIGNIFICANT CHANGE UP (ref 3.8–5.2)
RBC # FLD: 13.1 % — SIGNIFICANT CHANGE UP (ref 11–15)
SODIUM SERPL-SCNC: 140 MMOL/L — SIGNIFICANT CHANGE UP (ref 135–145)
TROPONIN I SERPL-MCNC: <.015 NG/ML — SIGNIFICANT CHANGE UP (ref 0.01–0.04)
TSH SERPL-MCNC: 8.51 UIU/ML — HIGH (ref 0.36–3.74)
WBC # BLD: 8.7 K/UL — SIGNIFICANT CHANGE UP (ref 3.8–10.5)
WBC # FLD AUTO: 8.7 K/UL — SIGNIFICANT CHANGE UP (ref 3.8–10.5)

## 2018-01-07 PROCEDURE — 71045 X-RAY EXAM CHEST 1 VIEW: CPT | Mod: 26

## 2018-01-07 PROCEDURE — 99285 EMERGENCY DEPT VISIT HI MDM: CPT

## 2018-01-07 PROCEDURE — 93010 ELECTROCARDIOGRAM REPORT: CPT

## 2018-01-07 PROCEDURE — 71275 CT ANGIOGRAPHY CHEST: CPT | Mod: 26

## 2018-01-07 PROCEDURE — 99223 1ST HOSP IP/OBS HIGH 75: CPT | Mod: AI

## 2018-01-07 RX ORDER — OXYCODONE AND ACETAMINOPHEN 5; 325 MG/1; MG/1
1 TABLET ORAL ONCE
Qty: 0 | Refills: 0 | Status: DISCONTINUED | OUTPATIENT
Start: 2018-01-07 | End: 2018-01-07

## 2018-01-07 RX ORDER — TRAMADOL HYDROCHLORIDE 50 MG/1
50 TABLET ORAL ONCE
Qty: 0 | Refills: 0 | Status: DISCONTINUED | OUTPATIENT
Start: 2018-01-07 | End: 2018-01-07

## 2018-01-07 RX ORDER — METOPROLOL TARTRATE 50 MG
25 TABLET ORAL ONCE
Qty: 0 | Refills: 0 | Status: DISCONTINUED | OUTPATIENT
Start: 2018-01-07 | End: 2018-01-07

## 2018-01-07 RX ORDER — TRAMADOL HYDROCHLORIDE 50 MG/1
50 TABLET ORAL ONCE
Qty: 0 | Refills: 0 | Status: DISCONTINUED | OUTPATIENT
Start: 2018-01-07 | End: 2018-01-08

## 2018-01-07 RX ORDER — SODIUM CHLORIDE 9 MG/ML
500 INJECTION INTRAMUSCULAR; INTRAVENOUS; SUBCUTANEOUS ONCE
Qty: 0 | Refills: 0 | Status: COMPLETED | OUTPATIENT
Start: 2018-01-07 | End: 2018-01-07

## 2018-01-07 RX ORDER — ASPIRIN/CALCIUM CARB/MAGNESIUM 324 MG
325 TABLET ORAL ONCE
Qty: 0 | Refills: 0 | Status: COMPLETED | OUTPATIENT
Start: 2018-01-07 | End: 2018-01-07

## 2018-01-07 RX ADMIN — SODIUM CHLORIDE 1000 MILLILITER(S): 9 INJECTION INTRAMUSCULAR; INTRAVENOUS; SUBCUTANEOUS at 16:43

## 2018-01-07 RX ADMIN — Medication 325 MILLIGRAM(S): at 18:38

## 2018-01-07 RX ADMIN — TRAMADOL HYDROCHLORIDE 50 MILLIGRAM(S): 50 TABLET ORAL at 18:47

## 2018-01-07 RX ADMIN — TRAMADOL HYDROCHLORIDE 50 MILLIGRAM(S): 50 TABLET ORAL at 19:24

## 2018-01-07 NOTE — H&P ADULT - HISTORY OF PRESENT ILLNESS
Pt is a 87 y/o female  w/pmhx of   afib, htn, hld, ppm, falls in past w/rib fx presents w chest pressure x 3 days; states at times felt racing heart has been compliant w/meds.  pt denies and fever ,chills;sob, n/v/d/c  was recently admitted for a fall. her generalized body pains are w/o change. In ed was in rapid afib

## 2018-01-07 NOTE — H&P ADULT - PSH
H/O cardiac pacemaker  [employer] placed 12/10  S/P Colonoscopic Polypectomy  15 years ago per pt  S/P TKR (total knee replacement)  Right TKR in 5/16

## 2018-01-07 NOTE — H&P ADULT - PROBLEM SELECTOR PROBLEM 3
Acquired hypothyroidism Closed fracture of third lumbar vertebra, unspecified fracture morphology, initial encounter

## 2018-01-07 NOTE — H&P ADULT - NSHPPHYSICALEXAM_GEN_ALL_CORE
Vital Signs Last 24 Hrs  T(C): 35.6 (08 Jan 2018 02:08), Max: 37.1 (08 Jan 2018 00:51)  T(F): 96 (08 Jan 2018 02:08), Max: 98.8 (08 Jan 2018 00:51)  HR: 99 (08 Jan 2018 02:08) (46 - 104)  BP: 124/60 (08 Jan 2018 02:08) (100/76 - 141/85)  BP(mean): --  RR: 16 (08 Jan 2018 02:08) (16 - 20)  SpO2: 98% (08 Jan 2018 02:08) (95% - 98%)    PHYSICAL EXAM:    GENERAL: thin elederly female  HEAD:  Atraumatic, Normocephalic  EYES: EOMI, PERRLA, conjunctiva and sclera clear  ENMT: No tonsillar erythema, exudates, or enlargement; Moist mucous membranes, No lesions  NECK: Supple, No JVD, Normal thyroid  NERVOUS SYSTEM:  Alert & Oriented X3, Good concentration; Motor Strength 5/5 B/L upper and lower extremities; DTRs 2+ intact and symmetric  CHEST/LUNG: Clear to percussion bilaterally; No rales, rhonchi, wheezing, or rubs, +point tenderness throughout back  HEART: Regular rate and rhythm; No rubs, or gallops, +S1,S2  ABDOMEN: Soft, Nontender, Nondistended; Bowel sounds present  EXTREMITIES:  2+ Peripheral Pulses, No clubbing, cyanosis, or edema  LYMPH: No cervical adenopathy  RECTAL: deferred  BREAST: No palpatble masses, skin no lesions   : deferred  SKIN: No rashes or lesions    IMPROVE VTE Individual Risk Assessment          RISK                                                          Points  [  ] Previous VTE                                                3  [  ] Thrombophilia                                             2  [  ] Lower limb paralysis                                    2        (unable to hold up >15 seconds)    [  ] Current Cancer                                             2         (within 6 months)  [ x ] Immobilization > 24 hrs                              1  [  ] ICU/CCU stay > 24 hours                            1  [  x] Age > 60                                                    1  IMPROVE VTE Score ____2____

## 2018-01-07 NOTE — ED PROVIDER NOTE - MEDICAL DECISION MAKING DETAILS
A/P: 89 y/o F w hx of a.fib, htn, hld, presents w chest; initially in rapid a.fib, converted to sinus rhythm without intervention; will get labs, CXR, CT angio to r/o PE, and likely admit

## 2018-01-07 NOTE — ED PROVIDER NOTE - PHYSICAL EXAMINATION
Gen: no acute distress  HEENT: clear orophaynx; EOMI; PERRL ~ 2mm  neck: full cervical ROM  CV: RRR; no m/g/r  lungs; CTAB; no w/r/r  abd: soft, nd, nt  ext; wwp; full ROM; no edema; nl pulses  neuro: no focal deficits  skin: no rash

## 2018-01-07 NOTE — H&P ADULT - NSHPLABSRESULTS_GEN_ALL_CORE
LABS:                        15.8   8.7   )-----------( 175      ( 07 Jan 2018 16:44 )             47.4     01-07    140  |  103  |  10  ----------------------------<  108<H>  4.2   |  29  |  0.57    Ca    8.4<L>      07 Jan 2018 16:44    TPro  7.0  /  Alb  3.2<L>  /  TBili  0.9  /  DBili  x   /  AST  24  /  ALT  28  /  AlkPhos  77  01-07    PT/INR - ( 07 Jan 2018 16:44 )   PT: 13.5 sec;   INR: 1.23 ratio         PTT - ( 07 Jan 2018 16:44 )  PTT:36.8 sec    CAPILLARY BLOOD GLUCOSE          RADIOLOGY & ADDITIONAL TESTS:    Imaging Personally Reviewed:  [ X] YES  [ ] NO

## 2018-01-07 NOTE — ED PROVIDER NOTE - OBJECTIVE STATEMENT
Pertinent PMH/PSH/FHx/SHx and Review of Systems contained within:    87 y/o F w hx of afib, htn, hld, pacemaker, presents w chest pressure x 3 days; denies fever or chills; denies cough, lower ext swelling; was admitted for fall about 1 week ago; denies any other complaints on ROS    PMH: as above  meds; tramadol; percocet; aspirin; amiodarone; metoprolol; docusate;   allergies; lidocaine  SH: deneis cig or drug use    EKG: rapid a.fib 128;   EKG #2:sinus rhythm;

## 2018-01-07 NOTE — ED PROVIDER NOTE - PSH
H/O cardiac pacemaker  [employer] placed 12/10  S/P Colonoscopic Polypectomy  15 years ago per pt  S/P TKR (total knee replacement)  Right TKR in 5/16

## 2018-01-08 DIAGNOSIS — Z29.9 ENCOUNTER FOR PROPHYLACTIC MEASURES, UNSPECIFIED: ICD-10-CM

## 2018-01-08 DIAGNOSIS — E03.2 HYPOTHYROIDISM DUE TO MEDICAMENTS AND OTHER EXOGENOUS SUBSTANCES: ICD-10-CM

## 2018-01-08 DIAGNOSIS — M54.9 DORSALGIA, UNSPECIFIED: ICD-10-CM

## 2018-01-08 DIAGNOSIS — I10 ESSENTIAL (PRIMARY) HYPERTENSION: ICD-10-CM

## 2018-01-08 DIAGNOSIS — E03.9 HYPOTHYROIDISM, UNSPECIFIED: ICD-10-CM

## 2018-01-08 DIAGNOSIS — S32.039A UNSPECIFIED FRACTURE OF THIRD LUMBAR VERTEBRA, INITIAL ENCOUNTER FOR CLOSED FRACTURE: ICD-10-CM

## 2018-01-08 DIAGNOSIS — R07.9 CHEST PAIN, UNSPECIFIED: ICD-10-CM

## 2018-01-08 DIAGNOSIS — E78.00 PURE HYPERCHOLESTEROLEMIA, UNSPECIFIED: ICD-10-CM

## 2018-01-08 DIAGNOSIS — I48.91 UNSPECIFIED ATRIAL FIBRILLATION: ICD-10-CM

## 2018-01-08 LAB
CK MB BLD-MCNC: 4.5 % — HIGH (ref 0–3.5)
CK MB CFR SERPL CALC: 14 NG/ML — HIGH (ref 0.5–3.6)
CK SERPL-CCNC: 308 U/L — HIGH (ref 26–192)
TROPONIN I SERPL-MCNC: 0.02 NG/ML — SIGNIFICANT CHANGE UP (ref 0.01–0.04)

## 2018-01-08 PROCEDURE — 72131 CT LUMBAR SPINE W/O DYE: CPT | Mod: 26

## 2018-01-08 PROCEDURE — 99233 SBSQ HOSP IP/OBS HIGH 50: CPT

## 2018-01-08 PROCEDURE — 76377 3D RENDER W/INTRP POSTPROCES: CPT | Mod: 26

## 2018-01-08 PROCEDURE — 99223 1ST HOSP IP/OBS HIGH 75: CPT

## 2018-01-08 RX ORDER — METHOCARBAMOL 500 MG/1
750 TABLET, FILM COATED ORAL THREE TIMES A DAY
Qty: 0 | Refills: 0 | Status: DISCONTINUED | OUTPATIENT
Start: 2018-01-08 | End: 2018-01-08

## 2018-01-08 RX ORDER — AMIODARONE HYDROCHLORIDE 400 MG/1
200 TABLET ORAL DAILY
Qty: 0 | Refills: 0 | Status: DISCONTINUED | OUTPATIENT
Start: 2018-01-08 | End: 2018-01-08

## 2018-01-08 RX ORDER — METOPROLOL TARTRATE 50 MG
50 TABLET ORAL DAILY
Qty: 0 | Refills: 0 | Status: DISCONTINUED | OUTPATIENT
Start: 2018-01-09 | End: 2018-01-09

## 2018-01-08 RX ORDER — PANTOPRAZOLE SODIUM 20 MG/1
40 TABLET, DELAYED RELEASE ORAL
Qty: 0 | Refills: 0 | Status: DISCONTINUED | OUTPATIENT
Start: 2018-01-08 | End: 2018-01-12

## 2018-01-08 RX ORDER — LEVOTHYROXINE SODIUM 125 MCG
50 TABLET ORAL DAILY
Qty: 0 | Refills: 0 | Status: DISCONTINUED | OUTPATIENT
Start: 2018-01-08 | End: 2018-01-12

## 2018-01-08 RX ORDER — MORPHINE SULFATE 50 MG/1
2 CAPSULE, EXTENDED RELEASE ORAL EVERY 6 HOURS
Qty: 0 | Refills: 0 | Status: DISCONTINUED | OUTPATIENT
Start: 2018-01-08 | End: 2018-01-10

## 2018-01-08 RX ORDER — METOPROLOL TARTRATE 50 MG
25 TABLET ORAL DAILY
Qty: 0 | Refills: 0 | Status: DISCONTINUED | OUTPATIENT
Start: 2018-01-08 | End: 2018-01-08

## 2018-01-08 RX ORDER — LEVOTHYROXINE SODIUM 125 MCG
50 TABLET ORAL DAILY
Qty: 0 | Refills: 0 | Status: DISCONTINUED | OUTPATIENT
Start: 2018-01-08 | End: 2018-01-08

## 2018-01-08 RX ORDER — ASPIRIN/CALCIUM CARB/MAGNESIUM 324 MG
81 TABLET ORAL DAILY
Qty: 0 | Refills: 0 | Status: DISCONTINUED | OUTPATIENT
Start: 2018-01-08 | End: 2018-01-12

## 2018-01-08 RX ORDER — SIMVASTATIN 20 MG/1
20 TABLET, FILM COATED ORAL AT BEDTIME
Qty: 0 | Refills: 0 | Status: DISCONTINUED | OUTPATIENT
Start: 2018-01-08 | End: 2018-01-08

## 2018-01-08 RX ORDER — DOCUSATE SODIUM 100 MG
100 CAPSULE ORAL
Qty: 0 | Refills: 0 | Status: DISCONTINUED | OUTPATIENT
Start: 2018-01-08 | End: 2018-01-08

## 2018-01-08 RX ORDER — DOCUSATE SODIUM 100 MG
100 CAPSULE ORAL
Qty: 0 | Refills: 0 | Status: DISCONTINUED | OUTPATIENT
Start: 2018-01-08 | End: 2018-01-12

## 2018-01-08 RX ORDER — OXYCODONE AND ACETAMINOPHEN 5; 325 MG/1; MG/1
1 TABLET ORAL EVERY 6 HOURS
Qty: 0 | Refills: 0 | Status: DISCONTINUED | OUTPATIENT
Start: 2018-01-08 | End: 2018-01-12

## 2018-01-08 RX ORDER — MORPHINE SULFATE 50 MG/1
2 CAPSULE, EXTENDED RELEASE ORAL EVERY 6 HOURS
Qty: 0 | Refills: 0 | Status: DISCONTINUED | OUTPATIENT
Start: 2018-01-08 | End: 2018-01-08

## 2018-01-08 RX ORDER — TRAMADOL HYDROCHLORIDE 50 MG/1
50 TABLET ORAL EVERY 8 HOURS
Qty: 0 | Refills: 0 | Status: DISCONTINUED | OUTPATIENT
Start: 2018-01-08 | End: 2018-01-12

## 2018-01-08 RX ORDER — SIMVASTATIN 20 MG/1
20 TABLET, FILM COATED ORAL AT BEDTIME
Qty: 0 | Refills: 0 | Status: DISCONTINUED | OUTPATIENT
Start: 2018-01-08 | End: 2018-01-12

## 2018-01-08 RX ORDER — METHOCARBAMOL 500 MG/1
750 TABLET, FILM COATED ORAL THREE TIMES A DAY
Qty: 0 | Refills: 0 | Status: DISCONTINUED | OUTPATIENT
Start: 2018-01-08 | End: 2018-01-12

## 2018-01-08 RX ORDER — PANTOPRAZOLE SODIUM 20 MG/1
40 TABLET, DELAYED RELEASE ORAL
Qty: 0 | Refills: 0 | Status: DISCONTINUED | OUTPATIENT
Start: 2018-01-08 | End: 2018-01-08

## 2018-01-08 RX ORDER — TRAMADOL HYDROCHLORIDE 50 MG/1
50 TABLET ORAL EVERY 8 HOURS
Qty: 0 | Refills: 0 | Status: DISCONTINUED | OUTPATIENT
Start: 2018-01-08 | End: 2018-01-08

## 2018-01-08 RX ORDER — METOPROLOL TARTRATE 50 MG
50 TABLET ORAL DAILY
Qty: 0 | Refills: 0 | Status: DISCONTINUED | OUTPATIENT
Start: 2018-01-08 | End: 2018-01-08

## 2018-01-08 RX ORDER — OXYCODONE AND ACETAMINOPHEN 5; 325 MG/1; MG/1
1 TABLET ORAL EVERY 6 HOURS
Qty: 0 | Refills: 0 | Status: DISCONTINUED | OUTPATIENT
Start: 2018-01-08 | End: 2018-01-08

## 2018-01-08 RX ADMIN — Medication 100 MILLIGRAM(S): at 07:44

## 2018-01-08 RX ADMIN — OXYCODONE AND ACETAMINOPHEN 1 TABLET(S): 5; 325 TABLET ORAL at 08:59

## 2018-01-08 RX ADMIN — OXYCODONE AND ACETAMINOPHEN 1 TABLET(S): 5; 325 TABLET ORAL at 21:08

## 2018-01-08 RX ADMIN — TRAMADOL HYDROCHLORIDE 50 MILLIGRAM(S): 50 TABLET ORAL at 00:01

## 2018-01-08 RX ADMIN — Medication 100 MILLIGRAM(S): at 18:34

## 2018-01-08 RX ADMIN — TRAMADOL HYDROCHLORIDE 50 MILLIGRAM(S): 50 TABLET ORAL at 01:26

## 2018-01-08 RX ADMIN — AMIODARONE HYDROCHLORIDE 200 MILLIGRAM(S): 400 TABLET ORAL at 11:57

## 2018-01-08 RX ADMIN — METHOCARBAMOL 750 MILLIGRAM(S): 500 TABLET, FILM COATED ORAL at 18:34

## 2018-01-08 RX ADMIN — OXYCODONE AND ACETAMINOPHEN 1 TABLET(S): 5; 325 TABLET ORAL at 21:52

## 2018-01-08 RX ADMIN — Medication 25 MILLIGRAM(S): at 11:57

## 2018-01-08 RX ADMIN — OXYCODONE AND ACETAMINOPHEN 1 TABLET(S): 5; 325 TABLET ORAL at 03:51

## 2018-01-08 RX ADMIN — Medication 81 MILLIGRAM(S): at 11:58

## 2018-01-08 RX ADMIN — MORPHINE SULFATE 2 MILLIGRAM(S): 50 CAPSULE, EXTENDED RELEASE ORAL at 12:13

## 2018-01-08 RX ADMIN — OXYCODONE AND ACETAMINOPHEN 1 TABLET(S): 5; 325 TABLET ORAL at 04:30

## 2018-01-08 RX ADMIN — SIMVASTATIN 20 MILLIGRAM(S): 20 TABLET, FILM COATED ORAL at 21:08

## 2018-01-08 RX ADMIN — Medication 50 MICROGRAM(S): at 07:43

## 2018-01-08 RX ADMIN — MORPHINE SULFATE 2 MILLIGRAM(S): 50 CAPSULE, EXTENDED RELEASE ORAL at 11:58

## 2018-01-08 RX ADMIN — METHOCARBAMOL 750 MILLIGRAM(S): 500 TABLET, FILM COATED ORAL at 07:43

## 2018-01-08 RX ADMIN — OXYCODONE AND ACETAMINOPHEN 1 TABLET(S): 5; 325 TABLET ORAL at 09:29

## 2018-01-08 RX ADMIN — METHOCARBAMOL 750 MILLIGRAM(S): 500 TABLET, FILM COATED ORAL at 23:01

## 2018-01-08 RX ADMIN — PANTOPRAZOLE SODIUM 40 MILLIGRAM(S): 20 TABLET, DELAYED RELEASE ORAL at 07:45

## 2018-01-08 NOTE — PROGRESS NOTE ADULT - SUBJECTIVE AND OBJECTIVE BOX
Patient is a 88y old  Female who presents with a chief complaint of epigastric pain radiating to chest (07 Jan 2018 23:19)        HPI:  Pt is a 89 y/o female  w/pmhx of   afib, htn, hld, ppm, falls in past w/rib fx presents w chest pressure x 3 days; states at times felt racing heart has been compliant w/meds.  pt denies and fever ,chills;sob, n/v/d/c  was recently admitted for a fall. her generalized body pains are w/o change. In ed was in rapid afib (07 Jan 2018 23:19)      SUBJECTIVE & OBJECTIVE: Pt seen and examined at bedside.   She is complaining of diffuse pain of the lower back, neck, and legs.  She is tearful and in moderate discomfort.  No acute events overnight.     PHYSICAL EXAM:  T(C): 35.8 (01-08-18 @ 12:31), Max: 37.1 (01-08-18 @ 00:51)  HR: 61 (01-08-18 @ 12:31) (61 - 104)  BP: 119/81 (01-08-18 @ 12:31) (91/65 - 124/60)  RR: 18 (01-08-18 @ 12:31) (16 - 20)  SpO2: 97% (01-08-18 @ 12:31) (95% - 98%)    Weight (kg): 49.7 (01-08 @ 02:08)  GENERAL: mild distress 2/2 to pain  HEAD:  Atraumatic, Normocephalic  EYES: EOMI, PERRLA, conjunctiva and sclera clear  ENMT: Moist mucous membranes  NECK: Supple, No JVD  NERVOUS SYSTEM:  Alert & Oriented X3, Motor Strength 5/5 B/L upper and lower extremities; DTRs 2+ intact and symmetric  CHEST/LUNG: Clear to auscultation bilaterally; No rales, rhonchi, wheezing, or rubs  HEART: Regular rate and rhythm; No murmurs, rubs, or gallops  ABDOMEN: Soft, Nontender, Nondistended; Bowel sounds present  EXTREMITIES:  2+ Peripheral Pulses, No clubbing, cyanosis, or edema        MEDICATIONS  (STANDING):  aspirin enteric coated 81 milliGRAM(s) Oral daily  docusate sodium 100 milliGRAM(s) Oral two times a day  levothyroxine 50 MICROGram(s) Oral daily  methocarbamol 750 milliGRAM(s) Oral three times a day  pantoprazole    Tablet 40 milliGRAM(s) Oral before breakfast  simvastatin 20 milliGRAM(s) Oral at bedtime    MEDICATIONS  (PRN):  morphine  - Injectable 2 milliGRAM(s) SubCutaneous every 6 hours PRN Severe Pain (7 - 10)  oxyCODONE    5 mG/acetaminophen 325 mG 1 Tablet(s) Oral every 6 hours PRN Severe Pain (7 - 10)  traMADol 50 milliGRAM(s) Oral every 8 hours PRN Moderate Pain (4 - 6)      LABS:                        15.8   8.7   )-----------( 175      ( 07 Jan 2018 16:44 )             47.4     01-07    140  |  103  |  10  ----------------------------<  108<H>  4.2   |  29  |  0.57    Ca    8.4<L>      07 Jan 2018 16:44  TPro  7.0  /  Alb  3.2<L>  /  TBili  0.9  /  DBili  x   /  AST  24  /  ALT  28  /  AlkPhos  77  01-07  PT/INR - ( 07 Jan 2018 16:44 )   PT: 13.5 sec;   INR: 1.23 ratio    PTT - ( 07 Jan 2018 16:44 )  PTT:36.8 sec    CARDIAC MARKERS ( 08 Jan 2018 15:38 )  .018 ng/mL / x     / 308 U/L / x     / 14.0 ng/mL  CARDIAC MARKERS ( 07 Jan 2018 16:44 )  <.015 ng/mL / x     / x     / x     / x        RADIOLOGY & ADDITIONAL TESTS:  < from: CT 3D Reconstruct w/ Workstation (01.08.18 @ 14:17) >  New superior endplate compression deformity of the L3 vertebral body of   approximately 20% since 12/25/2017. Mild bony retropulsion of   approximately 6 mm resulting in mild spinal canal narrowing.    Additional stable multilevel vertebral body compression deformities of   varying degrees.    < end of copied text >    DVT/GI ppx  Discussed with pt @ bedside

## 2018-01-08 NOTE — PROGRESS NOTE ADULT - PROBLEM SELECTOR PLAN 3
- would reconsult Ortho for follow up   - patient has significant pain and 20% loss of vertebral height  - Continue pain control with oral analgesic   - PT consult

## 2018-01-08 NOTE — CONSULT NOTE ADULT - SUBJECTIVE AND OBJECTIVE BOX
CARDIOLOGY CONSULT NOTE    Patient is a 88y Female with a known history of :  Closed fracture of third lumbar vertebra, unspecified fracture morphology, initial encounter (S32.039A)  Preventive measure (Z29.9)  Other chronic back pain (M54.9)  Hypercholesteremia (E78.00)  Essential hypertension (I10)  Acquired hypothyroidism (E03.9)  Rapid atrial fibrillation (I48.91)  Chest pain, unspecified type (R07.9)    HPI:  89 y/o female with a PMH of Afib (on asa for AC only secondary to falls) and s/p PPM for tachy/akua, HTN, HL, falls in past w/ rib fx presents w diffuse body pains including back/neck/ears/head/chest pressure x 3 days; states at times felt racing heart but has been compliant w/meds.  Pt denies and fever, chills, sob, n/v/d/c. Her generalized body pains are w/o change. In ed was in rapid afib.  Currently Afib 70-80s on floor.  Body/chest aches much improved.  TSH 8; on amio for afib  Michelle neg x 1      REVIEW OF SYSTEMS:    CONSTITUTIONAL: No fever, weight loss; + fatigue  EYES: No eye pain, visual disturbances, or discharge  ENMT:  No difficulty hearing, tinnitus, vertigo; No sinus or throat pain  NECK: No pain or stiffness  BREASTS: No pain, masses, or nipple discharge  RESPIRATORY: No cough, wheezing, chills or hemoptysis; No shortness of breath  CARDIOVASCULAR: +chest/body pain and palpitations; no dizziness, or leg swelling  GASTROINTESTINAL: +abdominal pain. No nausea, vomiting, or hematemesis; No diarrhea or constipation. No melena or hematochezia.  GENITOURINARY: No dysuria, frequency, hematuria, or incontinence  NEUROLOGICAL: No headaches, memory loss, loss of strength, numbness, or tremors  SKIN: No itching, burning, rashes, or lesions   LYMPH NODES: No enlarged glands  ENDOCRINE: No heat or cold intolerance; No hair loss  MUSCULOSKELETAL: No joint pain or swelling; No muscle, back, or extremity pain  PSYCHIATRIC: No depression, anxiety, mood swings, or difficulty sleeping  HEME/LYMPH: No easy bruising, or bleeding gums  ALLERGY AND IMMUNOLOGIC: No hives or eczema    MEDICATIONS  (STANDING):  amiodarone    Tablet 200 milliGRAM(s) Oral daily  aspirin enteric coated 81 milliGRAM(s) Oral daily  docusate sodium 100 milliGRAM(s) Oral two times a day  levothyroxine 50 MICROGram(s) Oral daily  methocarbamol 750 milliGRAM(s) Oral three times a day  metoprolol succinate ER 25 milliGRAM(s) Oral daily  pantoprazole    Tablet 40 milliGRAM(s) Oral before breakfast  simvastatin 20 milliGRAM(s) Oral at bedtime    MEDICATIONS  (PRN):  morphine  - Injectable 2 milliGRAM(s) SubCutaneous every 6 hours PRN Severe Pain (7 - 10)  oxyCODONE    5 mG/acetaminophen 325 mG 1 Tablet(s) Oral every 6 hours PRN Severe Pain (7 - 10)  traMADol 50 milliGRAM(s) Oral every 8 hours PRN Moderate Pain (4 - 6)    ALLERGIES: lidocaine (Urticaria)      FAMILY HISTORY:  No pertinent family history in first degree relatives      PHYSICAL EXAMINATION:  -----------------------------  T(C): 35.8 (01-08-18 @ 12:31), Max: 37.1 (01-08-18 @ 00:51)  HR: 61 (01-08-18 @ 12:31) (46 - 104)  BP: 119/81 (01-08-18 @ 12:31) (91/65 - 141/85)  RR: 18 (01-08-18 @ 12:31) (16 - 20)  SpO2: 97% (01-08-18 @ 12:31) (95% - 98%)  Wt(kg): --    01-07 @ 07:01  -  01-08 @ 07:00  --------------------------------------------------------  IN:  Total IN: 0 mL    OUT:    Voided: 300 mL  Total OUT: 300 mL    Total NET: -300 mL        Height (cm): 165.1 (01-07 @ 15:51)  Weight (kg): 49.7 (01-08 @ 02:08)  BMI (kg/m2): 18.2 (01-08 @ 02:08)  BSA (m2): 1.53 (01-08 @ 02:08)    Constitutional: well developed, normal appearance, well groomed, well nourished, no deformities and no acute distress.   Eyes: the conjunctiva exhibited no abnormalities and the eyelids demonstrated no xanthelasmas.   HEENT: normal oral mucosa, no oral pallor and no oral cyanosis.   Neck: normal jugular venous A waves present, normal jugular venous V waves present and no jugular venous coleman A waves.   Pulmonary: no respiratory distress, normal respiratory rhythm and effort, no accessory muscle use and lungs were clear to auscultation bilaterally.   Cardiovascular: irreg irreg  Abdomen: soft, non-tender, no hepato-splenomegaly and no abdominal mass palpated.   Musculoskeletal: the gait could not be assessed..   Extremities: no clubbing of the fingernails, no localized cyanosis, no petechial hemorrhages and no ischemic changes.   Skin: normal skin color and pigmentation, no rash, no venous stasis, no skin lesions, no skin ulcer and no xanthoma was observed.   Psychiatric: oriented to person, place, and time, the affect was normal, the mood was normal and not feeling anxious.     LABS:   --------  01-07    140  |  103  |  10  ----------------------------<  108<H>  4.2   |  29  |  0.57    Ca    8.4<L>      07 Jan 2018 16:44    TPro  7.0  /  Alb  3.2<L>  /  TBili  0.9  /  DBili  x   /  AST  24  /  ALT  28  /  AlkPhos  77  01-07                         15.8   8.7   )-----------( 175      ( 07 Jan 2018 16:44 )             47.4     PT/INR - ( 07 Jan 2018 16:44 )   PT: 13.5 sec;   INR: 1.23 ratio         PTT - ( 07 Jan 2018 16:44 )  PTT:36.8 sec    01-07 @ 16:44 CPK total:--, CKMB --, Troponin I - <.015 ng/mL

## 2018-01-08 NOTE — CONSULT NOTE ADULT - SUBJECTIVE AND OBJECTIVE BOX
Patient is a 88y Female currently admitted for chest pain/rapid Afib seen on the floor at the request of medicine for low back pain and incidental L3 VCF read at acute on chest CT. Patient is a poor historian, suspect mild dementia. Patient has a history of chronic back pain which she claims began around 5 years ago. Patient has been admitted multiple times in the past for complaints of low back pain, most recently admitted for back pain 12/25/17, CT L spine at that time demonstrated diffuse osteopenia, compression deformities of the superior L1, L2, and L5 endplate with subchondral sclerosis of L5, as well as L3 and L4 superior endplates to a lesser extent and patchy sclerosis of the T12 vertebral body. L Spine CT was consistent with previous imaging. Patient has a hx of falls, cannot remember most recent fall. Admits to pain along entire spine. Denies pain/injury elsewhere. Denies numbness/tingling/paresthesias/weakness. Denies radicular symptoms. Denies bowel/bladder incontinence. Denies fevers/chills. No other complaints at this time. Patient follows up with PCP (Dr. Gonzales) for back pain. Patient ambulates with walker at baseline. Patient has a hx of R hip CRPP (unsure of when or surgeon) and R TKA (5/16, unsure of surgeon)    HEALTH ISSUES - PROBLEM Dx:  Closed fracture of third lumbar vertebra, unspecified fracture morphology, initial encounter: Closed fracture of third lumbar vertebra, unspecified fracture morphology, initial encounter  Preventive measure: Preventive measure  Other chronic back pain: Other chronic back pain  Hypercholesteremia: Hypercholesteremia  Essential hypertension: Essential hypertension  Acquired hypothyroidism: Acquired hypothyroidism  Rapid atrial fibrillation: Rapid atrial fibrillation  Chest pain, unspecified type: Chest pain, unspecified type      MEDICATIONS  (STANDING):  amiodarone    Tablet 200 milliGRAM(s) Oral daily  aspirin enteric coated 81 milliGRAM(s) Oral daily  docusate sodium 100 milliGRAM(s) Oral two times a day  levothyroxine 50 MICROGram(s) Oral daily  methocarbamol 750 milliGRAM(s) Oral three times a day  metoprolol succinate ER 25 milliGRAM(s) Oral daily  pantoprazole    Tablet 40 milliGRAM(s) Oral before breakfast  simvastatin 20 milliGRAM(s) Oral at bedtime      Allergies    lidocaine (Urticaria)    Intolerances        PAST MEDICAL & SURGICAL HISTORY:  Hypothyroidism  Herpes zoster without complication: 2015  Pacemaker: left chest  HTN (hypertension)  Hypercholesteremia  Atrial fibrillation  Generalized Osteoarthritis  Lumbar Vertebral Fracture  Chronic Back Pain  H/O cardiac pacemaker: [employer] placed 12/10  S/P TKR (total knee replacement): Right TKR in 5/16  S/P Colonoscopic Polypectomy: 15 years ago per pt                            15.8   8.7   )-----------( 175      ( 07 Jan 2018 16:44 )             47.4       07 Jan 2018 16:44    140    |  103    |  10     ----------------------------<  108    4.2     |  29     |  0.57     Ca    8.4        07 Jan 2018 16:44    TPro  7.0    /  Alb  3.2    /  TBili  0.9    /  DBili  x      /  AST  24     /  ALT  28     /  AlkPhos  77     07 Jan 2018 16:44      PT/INR - ( 07 Jan 2018 16:44 )   PT: 13.5 sec;   INR: 1.23 ratio         PTT - ( 07 Jan 2018 16:44 )  PTT:36.8 sec        Vital Signs Last 24 Hrs  T(C): 36 (01-08-18 @ 05:27), Max: 37.1 (01-08-18 @ 00:51)  T(F): 96.8 (01-08-18 @ 05:27), Max: 98.8 (01-08-18 @ 00:51)  HR: 67 (01-08-18 @ 05:27) (46 - 104)  BP: 91/65 (01-08-18 @ 05:27) (91/65 - 141/85)  BP(mean): --  RR: 16 (01-08-18 @ 05:27) (16 - 20)  SpO2: 95% (01-08-18 @ 05:27) (95% - 98%)    Gen: NAD    Spine PE:  Skin intact  No gross deformity  No midnline TTP C/T/L/S spine  No bony step offs  No paraspinal muscle ttp/hypertonicity   Negative clonus  Negative babinski  Negative bajwa  No saddle anesthesia  Neg log roll B/L  Able to SLR B/L    Motor:            ElbowFlex    WristExt   ElbowExt   FingerFlex   FingerAbd     R            5/5 ------------------------------------------------------>  L             5/5------------------------------------------------------->                    C5                C6              C7               C8           T1   R            0/5                0/5            0/5             0/5          0/5  L             0/5               0/5             0/5             0/5          0/5          HipFlex   HipExt   KneeFlex   KneeExt   AnkleDorsi   AnklePlantar   HaluxDorsi   HaluxPlantar  R        4/5        4/5            5/5 ---------------------------------------------------------------------->  L         4/5        4/5            5/5 ---------------------------------------------------------------------->        Sensory:  C5-T1, L20S1 SILT B/L    Imaging:  CTA   No pulmonary embolism.  Basilar atelectasis in the left lower lobe.  Suspicious acute fracture of L3.     A/P: 88y Female with Multiple chronic lumbar VCF  Possible interval worsening of L3 VCF read on CT- no TTP  Pain control  WBAT with assistive devices as needed  FU CT C/TL Sp  SCDs  Will discuss case with Dr. Son  Recommend pain management   Further recs pending spine CT Patient is a 88y Female currently admitted for chest pain/rapid Afib seen on the floor at the request of medicine for low back pain and incidental L3 VCF read at acute on chest CT. Patient is a poor historian, suspect mild dementia. Patient has a history of chronic back pain which she claims began around 5 years ago. Denies acute worsening. Patient has been admitted multiple times in the past for complaints of low back pain, most recently admitted for back pain 12/25/17, CT L spine at that time demonstrated diffuse osteopenia, compression deformities of the superior L1, L2, and L5 endplate with subchondral sclerosis of L5, as well as L3 and L4 superior endplates to a lesser extent and patchy sclerosis of the T12 vertebral body. L Spine CT was consistent with previous imaging. C Spine imaging at that time was negative. Patient has a hx of falls, cannot remember most recent fall. Admits to pain along entire spine. No acute worsening. Denies pain/injury elsewhere. Denies numbness/tingling/paresthesias/weakness. Denies radicular symptoms. Denies bowel/bladder incontinence. Denies fevers/chills. No other complaints at this time. Patient follows up with PCP (Dr. Gonzales) for back pain. Patient ambulates with walker at baseline. Patient has a hx of R hip CRPP (unsure of when or surgeon) and R TKA (5/16, unsure of surgeon)    HEALTH ISSUES - PROBLEM Dx:  Closed fracture of third lumbar vertebra, unspecified fracture morphology, initial encounter: Closed fracture of third lumbar vertebra, unspecified fracture morphology, initial encounter  Preventive measure: Preventive measure  Other chronic back pain: Other chronic back pain  Hypercholesteremia: Hypercholesteremia  Essential hypertension: Essential hypertension  Acquired hypothyroidism: Acquired hypothyroidism  Rapid atrial fibrillation: Rapid atrial fibrillation  Chest pain, unspecified type: Chest pain, unspecified type      MEDICATIONS  (STANDING):  amiodarone    Tablet 200 milliGRAM(s) Oral daily  aspirin enteric coated 81 milliGRAM(s) Oral daily  docusate sodium 100 milliGRAM(s) Oral two times a day  levothyroxine 50 MICROGram(s) Oral daily  methocarbamol 750 milliGRAM(s) Oral three times a day  metoprolol succinate ER 25 milliGRAM(s) Oral daily  pantoprazole    Tablet 40 milliGRAM(s) Oral before breakfast  simvastatin 20 milliGRAM(s) Oral at bedtime      Allergies    lidocaine (Urticaria)    Intolerances        PAST MEDICAL & SURGICAL HISTORY:  Hypothyroidism  Herpes zoster without complication: 2015  Pacemaker: left chest  HTN (hypertension)  Hypercholesteremia  Atrial fibrillation  Generalized Osteoarthritis  Lumbar Vertebral Fracture  Chronic Back Pain  H/O cardiac pacemaker: [employer] placed 12/10  S/P TKR (total knee replacement): Right TKR in 5/16  S/P Colonoscopic Polypectomy: 15 years ago per pt                            15.8   8.7   )-----------( 175      ( 07 Jan 2018 16:44 )             47.4       07 Jan 2018 16:44    140    |  103    |  10     ----------------------------<  108    4.2     |  29     |  0.57     Ca    8.4        07 Jan 2018 16:44    TPro  7.0    /  Alb  3.2    /  TBili  0.9    /  DBili  x      /  AST  24     /  ALT  28     /  AlkPhos  77     07 Jan 2018 16:44      PT/INR - ( 07 Jan 2018 16:44 )   PT: 13.5 sec;   INR: 1.23 ratio         PTT - ( 07 Jan 2018 16:44 )  PTT:36.8 sec        Vital Signs Last 24 Hrs  T(C): 36 (01-08-18 @ 05:27), Max: 37.1 (01-08-18 @ 00:51)  T(F): 96.8 (01-08-18 @ 05:27), Max: 98.8 (01-08-18 @ 00:51)  HR: 67 (01-08-18 @ 05:27) (46 - 104)  BP: 91/65 (01-08-18 @ 05:27) (91/65 - 141/85)  BP(mean): --  RR: 16 (01-08-18 @ 05:27) (16 - 20)  SpO2: 95% (01-08-18 @ 05:27) (95% - 98%)    Gen: NAD    Spine PE:  Skin intact  No gross deformity  No midnline TTP C/T/L/S spine  No bony step offs  No paraspinal muscle ttp/hypertonicity   Negative clonus  Negative babinski  Negative bajwa  No saddle anesthesia  Neg log roll B/L  Able to SLR B/L    Motor:            ElbowFlex    WristExt   ElbowExt   FingerFlex   FingerAbd     R            5/5 ------------------------------------------------------>  L             5/5------------------------------------------------------->                    C5                C6              C7               C8           T1   R            0/5                0/5            0/5             0/5          0/5  L             0/5               0/5             0/5             0/5          0/5          HipFlex   HipExt   KneeFlex   KneeExt   AnkleDorsi   AnklePlantar   HaluxDorsi   HaluxPlantar  R        4/5        4/5            5/5 ---------------------------------------------------------------------->  L         4/5        4/5            5/5 ---------------------------------------------------------------------->        Sensory:  C5-T1, L20S1 SILT B/L    Imaging:  CTA 1/7/18)   No pulmonary embolism.  Basilar atelectasis in the left lower lobe.  Suspicious acute fracture of L3. Multiple chronic appearing thoracic/lumbar VCF.     CT Thoracic Sp (12/21/17)  Mild compression deformity of T12 of indeterminate age, but new   since 7/1/2016. Correlate with clinical symptoms and CT or MRI as indicated.   Additional chronic compression deformities of T5, T7, L1 and L2.   CT L spine (12/25/17)  Diffuse osteopenia is again noted. Redemonstrated are compression fracture   deformities of the superior L1, L2, and L5 endplate with subchondral   sclerosis of L5, as well as L3 and L4 superior endplates to a lesser extent,   unchanged. Patchy sclerosis of the T12 vertebral body is again noted. No new   compression fractures evident.   CT C spine (12/25/17)  Straightening of the cervical lordosis due to muscle spasm and/or   positioning.     There is no prevertebral soft tissue swelling. Vertebral body heights and   alignment are maintained.     The atlantodental and atlanto-occipital joints are maintained. Articular   facets and posterior elements alignment is maintained.     Biapical interstitial thickening is noted. Atheromatous calcifications along   the aorta are noted.       A/P: 88y Female with Multiple chronic lumbar/thoracic VCF  Possible interval worsening of L3 VCF read on CT- no TTP  Pain control  WBAT with assistive devices as needed  FU CT L Sp  SCDs  Will discuss case with Dr. Son  Recommend pain management   Follow up with Dr. Son in office in 1-2 weeks  Ortho stable Patient is a 88y Female currently admitted for chest pain/rapid Afib seen on the floor at the request of medicine for low back pain and incidental L3 VCF read at acute on chest CT. Patient is a poor historian, suspect mild dementia. Patient has a history of chronic back pain which she claims began around 5 years ago. Denies acute worsening. Patient has been admitted multiple times in the past for complaints of low back pain, most recently admitted for back pain 12/25/17, CT L spine at that time demonstrated diffuse osteopenia, compression deformities of the superior L1, L2, and L5 endplate with subchondral sclerosis of L5, as well as L3 and L4 superior endplates to a lesser extent and patchy sclerosis of the T12 vertebral body. L Spine CT was consistent with previous imaging. C Spine imaging at that time was negative. Patient has a hx of falls, cannot remember most recent fall. Admits to pain along entire spine. No acute worsening. Denies pain/injury elsewhere. Denies numbness/tingling/paresthesias/weakness. Denies radicular symptoms. Denies bowel/bladder incontinence. Denies fevers/chills. No other complaints at this time. Patient follows up with PCP (Dr. Gonzales) for back pain. Patient ambulates with walker at baseline. Patient has a hx of R hip CRPP (unsure of when or surgeon) and R TKA (5/16, unsure of surgeon)    HEALTH ISSUES - PROBLEM Dx:  Closed fracture of third lumbar vertebra, unspecified fracture morphology, initial encounter: Closed fracture of third lumbar vertebra, unspecified fracture morphology, initial encounter  Preventive measure: Preventive measure  Other chronic back pain: Other chronic back pain  Hypercholesteremia: Hypercholesteremia  Essential hypertension: Essential hypertension  Acquired hypothyroidism: Acquired hypothyroidism  Rapid atrial fibrillation: Rapid atrial fibrillation  Chest pain, unspecified type: Chest pain, unspecified type      MEDICATIONS  (STANDING):  amiodarone    Tablet 200 milliGRAM(s) Oral daily  aspirin enteric coated 81 milliGRAM(s) Oral daily  docusate sodium 100 milliGRAM(s) Oral two times a day  levothyroxine 50 MICROGram(s) Oral daily  methocarbamol 750 milliGRAM(s) Oral three times a day  metoprolol succinate ER 25 milliGRAM(s) Oral daily  pantoprazole    Tablet 40 milliGRAM(s) Oral before breakfast  simvastatin 20 milliGRAM(s) Oral at bedtime      Allergies    lidocaine (Urticaria)    Intolerances        PAST MEDICAL & SURGICAL HISTORY:  Hypothyroidism  Herpes zoster without complication: 2015  Pacemaker: left chest  HTN (hypertension)  Hypercholesteremia  Atrial fibrillation  Generalized Osteoarthritis  Lumbar Vertebral Fracture  Chronic Back Pain  H/O cardiac pacemaker: [employer] placed 12/10  S/P TKR (total knee replacement): Right TKR in 5/16  S/P Colonoscopic Polypectomy: 15 years ago per pt                            15.8   8.7   )-----------( 175      ( 07 Jan 2018 16:44 )             47.4       07 Jan 2018 16:44    140    |  103    |  10     ----------------------------<  108    4.2     |  29     |  0.57     Ca    8.4        07 Jan 2018 16:44    TPro  7.0    /  Alb  3.2    /  TBili  0.9    /  DBili  x      /  AST  24     /  ALT  28     /  AlkPhos  77     07 Jan 2018 16:44      PT/INR - ( 07 Jan 2018 16:44 )   PT: 13.5 sec;   INR: 1.23 ratio         PTT - ( 07 Jan 2018 16:44 )  PTT:36.8 sec        Vital Signs Last 24 Hrs  T(C): 36 (01-08-18 @ 05:27), Max: 37.1 (01-08-18 @ 00:51)  T(F): 96.8 (01-08-18 @ 05:27), Max: 98.8 (01-08-18 @ 00:51)  HR: 67 (01-08-18 @ 05:27) (46 - 104)  BP: 91/65 (01-08-18 @ 05:27) (91/65 - 141/85)  BP(mean): --  RR: 16 (01-08-18 @ 05:27) (16 - 20)  SpO2: 95% (01-08-18 @ 05:27) (95% - 98%)    Gen: NAD    Spine PE:  Skin intact  No gross deformity  No midnline TTP C/T/L/S spine  No bony step offs  No paraspinal muscle ttp/hypertonicity   Negative clonus  Negative babinski  Negative bajwa  No saddle anesthesia  Neg log roll B/L  Able to SLR B/L    Motor:            ElbowFlex    WristExt   ElbowExt   FingerFlex   FingerAbd     R            5/5 ------------------------------------------------------>  L             5/5------------------------------------------------------->            HipFlex   HipExt   KneeFlex   KneeExt   AnkleDorsi   AnklePlantar   HaluxDorsi   HaluxPlantar  R        4/5        4/5            5/5 ---------------------------------------------------------------------->  L         4/5        4/5            5/5 ---------------------------------------------------------------------->        Sensory:  C5-T1, L20S1 SILT B/L    Imaging:  CTA 1/7/18)   No pulmonary embolism.  Basilar atelectasis in the left lower lobe.  Suspicious acute fracture of L3. Multiple chronic appearing thoracic/lumbar VCF.     CT Thoracic Sp (12/21/17)  Mild compression deformity of T12 of indeterminate age, but new   since 7/1/2016. Correlate with clinical symptoms and CT or MRI as indicated.   Additional chronic compression deformities of T5, T7, L1 and L2.   CT L spine (12/25/17)  Diffuse osteopenia is again noted. Redemonstrated are compression fracture   deformities of the superior L1, L2, and L5 endplate with subchondral   sclerosis of L5, as well as L3 and L4 superior endplates to a lesser extent,   unchanged. Patchy sclerosis of the T12 vertebral body is again noted. No new   compression fractures evident.   CT C spine (12/25/17)  Straightening of the cervical lordosis due to muscle spasm and/or   positioning.     There is no prevertebral soft tissue swelling. Vertebral body heights and   alignment are maintained.     The atlantodental and atlanto-occipital joints are maintained. Articular   facets and posterior elements alignment is maintained.     Biapical interstitial thickening is noted. Atheromatous calcifications along   the aorta are noted.       A/P: 88y Female with Multiple chronic lumbar/thoracic VCF  Possible interval worsening of L3 VCF read on CT- no TTP  Pain control  WBAT with assistive devices as needed  Will order LSO brace  SCDs  Will discuss case with Dr. Son  Recommend pain management   Follow up with Dr. Son in office in 1-2 weeks  Ortho stable

## 2018-01-08 NOTE — PROGRESS NOTE ADULT - PROBLEM SELECTOR PLAN 1
Amiodarone likely causing hypothyroidism  will d/c Amiodarone   follow am free T4, T3  repeat TSH in 6 weeks to assess function

## 2018-01-08 NOTE — CONSULT NOTE ADULT - ASSESSMENT
87 y/o female with a PMH of Afib (on asa for AC only secondary to falls) and s/p PPM for tachy/akua, HTN, HL, falls in past w/ rib fx presents w diffuse body pains including back/neck/ears/head/chest pressure x 3 days; states at times felt racing heart but has been compliant w/meds.  Pt denies and fever, chills, sob, n/v/d/c. Her generalized body pains are w/o change. In ed was in rapid afib.  Currently Afib 70-80s on floor.  Body/chest aches much improved.  TSH 8; on amio for afib  Michelle neg x 1    Sx unlikely ACS, but concerned for amio induced hypothyroidism.    -d/c amio  -increase metoprolol to 50mg  -endocrine evaluation  -cont to monitor on tele  -trend Michelle  -2D echo  -cont synthroid; adjustments as per endo  -PPM interrogation with hx of palpitations

## 2018-01-09 ENCOUNTER — APPOINTMENT (OUTPATIENT)
Dept: ELECTROPHYSIOLOGY | Facility: CLINIC | Age: 83
End: 2018-01-09

## 2018-01-09 LAB
ALBUMIN SERPL ELPH-MCNC: 3.2 G/DL — LOW (ref 3.3–5)
ALP SERPL-CCNC: 80 U/L — SIGNIFICANT CHANGE UP (ref 40–120)
ALT FLD-CCNC: 30 U/L — SIGNIFICANT CHANGE UP (ref 12–78)
AMYLASE P1 CFR SERPL: 51 U/L — SIGNIFICANT CHANGE UP (ref 25–115)
ANION GAP SERPL CALC-SCNC: 10 MMOL/L — SIGNIFICANT CHANGE UP (ref 5–17)
ANION GAP SERPL CALC-SCNC: 11 MMOL/L — SIGNIFICANT CHANGE UP (ref 5–17)
AST SERPL-CCNC: 31 U/L — SIGNIFICANT CHANGE UP (ref 15–37)
BASOPHILS # BLD AUTO: 0 K/UL — SIGNIFICANT CHANGE UP (ref 0–0.2)
BASOPHILS NFR BLD AUTO: 0.5 % — SIGNIFICANT CHANGE UP (ref 0–2)
BILIRUB DIRECT SERPL-MCNC: 0.23 MG/DL — HIGH (ref 0.05–0.2)
BILIRUB INDIRECT FLD-MCNC: 1.2 MG/DL — HIGH (ref 0.2–1)
BILIRUB SERPL-MCNC: 1.4 MG/DL — HIGH (ref 0.2–1.2)
BUN SERPL-MCNC: 10 MG/DL — SIGNIFICANT CHANGE UP (ref 7–23)
BUN SERPL-MCNC: 7 MG/DL — SIGNIFICANT CHANGE UP (ref 7–23)
CALCIUM SERPL-MCNC: 8.5 MG/DL — SIGNIFICANT CHANGE UP (ref 8.5–10.1)
CALCIUM SERPL-MCNC: 9.2 MG/DL — SIGNIFICANT CHANGE UP (ref 8.5–10.1)
CHLORIDE SERPL-SCNC: 100 MMOL/L — SIGNIFICANT CHANGE UP (ref 96–108)
CHLORIDE SERPL-SCNC: 97 MMOL/L — SIGNIFICANT CHANGE UP (ref 96–108)
CK MB BLD-MCNC: 1.9 % — SIGNIFICANT CHANGE UP (ref 0–3.5)
CK MB CFR SERPL CALC: 6.3 NG/ML — HIGH (ref 0.5–3.6)
CK SERPL-CCNC: 328 U/L — HIGH (ref 26–192)
CO2 SERPL-SCNC: 28 MMOL/L — SIGNIFICANT CHANGE UP (ref 22–31)
CO2 SERPL-SCNC: 30 MMOL/L — SIGNIFICANT CHANGE UP (ref 22–31)
CREAT SERPL-MCNC: 0.55 MG/DL — SIGNIFICANT CHANGE UP (ref 0.5–1.3)
CREAT SERPL-MCNC: 0.74 MG/DL — SIGNIFICANT CHANGE UP (ref 0.5–1.3)
EOSINOPHIL # BLD AUTO: 0 K/UL — SIGNIFICANT CHANGE UP (ref 0–0.5)
EOSINOPHIL NFR BLD AUTO: 0.1 % — SIGNIFICANT CHANGE UP (ref 0–6)
GLUCOSE SERPL-MCNC: 77 MG/DL — SIGNIFICANT CHANGE UP (ref 70–99)
GLUCOSE SERPL-MCNC: 90 MG/DL — SIGNIFICANT CHANGE UP (ref 70–99)
HCT VFR BLD CALC: 45.4 % — HIGH (ref 34.5–45)
HCT VFR BLD CALC: 49.6 % — HIGH (ref 34.5–45)
HGB BLD-MCNC: 15.3 G/DL — SIGNIFICANT CHANGE UP (ref 11.5–15.5)
HGB BLD-MCNC: 16.7 G/DL — HIGH (ref 11.5–15.5)
LACTATE SERPL-SCNC: 2.2 MMOL/L — HIGH (ref 0.7–2)
LIDOCAIN IGE QN: 66 U/L — LOW (ref 73–393)
LYMPHOCYTES # BLD AUTO: 0.5 K/UL — LOW (ref 1–3.3)
LYMPHOCYTES # BLD AUTO: 7 % — LOW (ref 13–44)
MAGNESIUM SERPL-MCNC: 2.3 MG/DL — SIGNIFICANT CHANGE UP (ref 1.6–2.6)
MCHC RBC-ENTMCNC: 33.6 GM/DL — SIGNIFICANT CHANGE UP (ref 32–36)
MCHC RBC-ENTMCNC: 33.6 GM/DL — SIGNIFICANT CHANGE UP (ref 32–36)
MCHC RBC-ENTMCNC: 33.8 PG — SIGNIFICANT CHANGE UP (ref 27–34)
MCHC RBC-ENTMCNC: 33.9 PG — SIGNIFICANT CHANGE UP (ref 27–34)
MCV RBC AUTO: 100.6 FL — HIGH (ref 80–100)
MCV RBC AUTO: 100.7 FL — HIGH (ref 80–100)
MONOCYTES # BLD AUTO: 0.4 K/UL — SIGNIFICANT CHANGE UP (ref 0–0.9)
MONOCYTES NFR BLD AUTO: 4.9 % — SIGNIFICANT CHANGE UP (ref 2–14)
NEUTROPHILS # BLD AUTO: 6.6 K/UL — SIGNIFICANT CHANGE UP (ref 1.8–7.4)
NEUTROPHILS NFR BLD AUTO: 87.5 % — HIGH (ref 43–77)
PLATELET # BLD AUTO: 191 K/UL — SIGNIFICANT CHANGE UP (ref 150–400)
PLATELET # BLD AUTO: 193 K/UL — SIGNIFICANT CHANGE UP (ref 150–400)
POTASSIUM SERPL-MCNC: 3.6 MMOL/L — SIGNIFICANT CHANGE UP (ref 3.5–5.3)
POTASSIUM SERPL-MCNC: 3.7 MMOL/L — SIGNIFICANT CHANGE UP (ref 3.5–5.3)
POTASSIUM SERPL-SCNC: 3.6 MMOL/L — SIGNIFICANT CHANGE UP (ref 3.5–5.3)
POTASSIUM SERPL-SCNC: 3.7 MMOL/L — SIGNIFICANT CHANGE UP (ref 3.5–5.3)
PROT SERPL-MCNC: 6.9 GM/DL — SIGNIFICANT CHANGE UP (ref 6–8.3)
RBC # BLD: 4.51 M/UL — SIGNIFICANT CHANGE UP (ref 3.8–5.2)
RBC # BLD: 4.92 M/UL — SIGNIFICANT CHANGE UP (ref 3.8–5.2)
RBC # FLD: 12.9 % — SIGNIFICANT CHANGE UP (ref 11–15)
RBC # FLD: 13 % — SIGNIFICANT CHANGE UP (ref 11–15)
SODIUM SERPL-SCNC: 138 MMOL/L — SIGNIFICANT CHANGE UP (ref 135–145)
SODIUM SERPL-SCNC: 138 MMOL/L — SIGNIFICANT CHANGE UP (ref 135–145)
T3FREE SERPL-MCNC: 1.45 PG/ML — LOW (ref 1.8–4.6)
T4 AB SER-ACNC: 11.1 UG/DL — SIGNIFICANT CHANGE UP (ref 4.6–12)
T4 FREE SERPL-MCNC: 1.6 NG/DL — SIGNIFICANT CHANGE UP (ref 0.9–1.8)
TROPONIN I SERPL-MCNC: 0.03 NG/ML — SIGNIFICANT CHANGE UP (ref 0.01–0.04)
TROPONIN I SERPL-MCNC: 0.05 NG/ML — HIGH (ref 0.01–0.04)
WBC # BLD: 7.6 K/UL — SIGNIFICANT CHANGE UP (ref 3.8–10.5)
WBC # BLD: 7.8 K/UL — SIGNIFICANT CHANGE UP (ref 3.8–10.5)
WBC # FLD AUTO: 7.6 K/UL — SIGNIFICANT CHANGE UP (ref 3.8–10.5)
WBC # FLD AUTO: 7.8 K/UL — SIGNIFICANT CHANGE UP (ref 3.8–10.5)

## 2018-01-09 PROCEDURE — 93010 ELECTROCARDIOGRAM REPORT: CPT

## 2018-01-09 PROCEDURE — 99233 SBSQ HOSP IP/OBS HIGH 50: CPT

## 2018-01-09 PROCEDURE — 99232 SBSQ HOSP IP/OBS MODERATE 35: CPT

## 2018-01-09 RX ORDER — METOPROLOL TARTRATE 50 MG
25 TABLET ORAL THREE TIMES A DAY
Qty: 0 | Refills: 0 | Status: DISCONTINUED | OUTPATIENT
Start: 2018-01-09 | End: 2018-01-12

## 2018-01-09 RX ORDER — HALOPERIDOL DECANOATE 100 MG/ML
2 INJECTION INTRAMUSCULAR ONCE
Qty: 0 | Refills: 0 | Status: DISCONTINUED | OUTPATIENT
Start: 2018-01-09 | End: 2018-01-10

## 2018-01-09 RX ORDER — QUETIAPINE FUMARATE 200 MG/1
12.5 TABLET, FILM COATED ORAL EVERY 12 HOURS
Qty: 0 | Refills: 0 | Status: DISCONTINUED | OUTPATIENT
Start: 2018-01-09 | End: 2018-01-12

## 2018-01-09 RX ORDER — HALOPERIDOL DECANOATE 100 MG/ML
2 INJECTION INTRAMUSCULAR ONCE
Qty: 0 | Refills: 0 | Status: COMPLETED | OUTPATIENT
Start: 2018-01-09 | End: 2018-01-09

## 2018-01-09 RX ADMIN — METHOCARBAMOL 750 MILLIGRAM(S): 500 TABLET, FILM COATED ORAL at 06:23

## 2018-01-09 RX ADMIN — QUETIAPINE FUMARATE 12.5 MILLIGRAM(S): 200 TABLET, FILM COATED ORAL at 17:54

## 2018-01-09 RX ADMIN — Medication 50 MILLIGRAM(S): at 06:24

## 2018-01-09 RX ADMIN — METHOCARBAMOL 750 MILLIGRAM(S): 500 TABLET, FILM COATED ORAL at 21:15

## 2018-01-09 RX ADMIN — Medication 50 MICROGRAM(S): at 06:23

## 2018-01-09 RX ADMIN — PANTOPRAZOLE SODIUM 40 MILLIGRAM(S): 20 TABLET, DELAYED RELEASE ORAL at 06:23

## 2018-01-09 RX ADMIN — SIMVASTATIN 20 MILLIGRAM(S): 20 TABLET, FILM COATED ORAL at 21:15

## 2018-01-09 RX ADMIN — Medication 100 MILLIGRAM(S): at 06:23

## 2018-01-09 RX ADMIN — Medication 25 MILLIGRAM(S): at 21:15

## 2018-01-09 RX ADMIN — HALOPERIDOL DECANOATE 2 MILLIGRAM(S): 100 INJECTION INTRAMUSCULAR at 10:51

## 2018-01-09 NOTE — PROGRESS NOTE ADULT - SUBJECTIVE AND OBJECTIVE BOX
Patient is a 88y old  Female who presents with a chief complaint of epigastric pain radiating to chest (07 Jan 2018 23:19)      PAST MEDICAL & SURGICAL HISTORY:  Hypothyroidism  Herpes zoster without complication: 2015  HTN (hypertension)  Hypercholesteremia  Atrial fibrillation  Generalized Osteoarthritis  Lumbar Vertebral Fracture  Chronic Back Pain  H/O cardiac pacemaker: [employer] placed 12/10  S/P TKR (total knee replacement): Right TKR in 5/16  S/P Colonoscopic Polypectomy: 15 years ago per pt      INTERVAL HISTORY: Resting in chair confused  	  MEDICATIONS:  MEDICATIONS  (STANDING):  aspirin enteric coated 81 milliGRAM(s) Oral daily  docusate sodium 100 milliGRAM(s) Oral two times a day  levothyroxine 50 MICROGram(s) Oral daily  methocarbamol 750 milliGRAM(s) Oral three times a day  metoprolol succinate ER 50 milliGRAM(s) Oral daily  pantoprazole    Tablet 40 milliGRAM(s) Oral before breakfast  simvastatin 20 milliGRAM(s) Oral at bedtime    MEDICATIONS  (PRN):  morphine  - Injectable 2 milliGRAM(s) SubCutaneous every 6 hours PRN Severe Pain (7 - 10)  oxyCODONE    5 mG/acetaminophen 325 mG 1 Tablet(s) Oral every 6 hours PRN Severe Pain (7 - 10)  traMADol 50 milliGRAM(s) Oral every 8 hours PRN Moderate Pain (4 - 6)      Vitals:  T(F): 97.4 (01-09-18 @ 05:42), Max: 98.2 (01-09-18 @ 00:29)  HR: 66 (01-09-18 @ 05:42) (53 - 66)  BP: 136/66 (01-09-18 @ 05:42) (118/79 - 136/66)  RR: 18 (01-09-18 @ 05:42) (18 - 18)  SpO2: 98% (01-09-18 @ 05:42) (96% - 98%)  Wt(kg): -- 49.5 kg    Weight (kg): 49.7 (01-08 @ 02:08)  BMI (kg/m2): 18.2 (01-08 @ 02:08)      PHYSICAL EXAM:  Neuro: Awake, responsive  CV: S1 S2 RRR  Lungs: CTABL  GI: Soft, BS +, ND, NT  Extremities: No edema    TELEMETRY: paced, PVCs, RSR  	    RADIOLOGY: < from: Xray Chest 1 View AP/PA. (01.07.18 @ 17:39) >  1. Stable cardiomegaly.   2. Since 1/7/18 study, there is interval development of mild pulmonary   vascular congestion.  3. New trace, bilateral pleural effusions.   4. Bibasilar atelectasis and/or pneumonia, as above.    < end of copied text >      DIAGNOSTIC TESTING:    [X ] Echocardiogram: < from: Transthoracic Echocardiogram (09.29.15 @ 16:35) >  1. Mitral annular calcification, otherwise normal mitral  valve. Mild mitral regurgitation.  2. Severely dilated left atrium.  LA volume index = 45  cc/m2.  3. Normal left ventricular internal dimensions and wall  thicknesses.  4. Normal left ventricular systolic function. No segmental  wall motion abnormalities.  5. Normal right ventricular size and function.   A device  wire is noted in the right heart.  6. Estimated right ventricular systolic pressure equals 42  mm Hg, assuming right atrial pressure equals 10 mm Hg,  consistent with mild pulmonary hypertension    < end of copied text >      LABS:	 	    CARDIAC MARKERS:  Troponin I, Serum: .018 ng/mL (01-08 @ 15:38)  Troponin I, Serum: <.015 ng/mL (01-07 @ 16:44)      09 Jan 2018 07:35    138    |  97     |  7      ----------------------------<  77     3.6     |  30     |  0.55   07 Jan 2018 16:44    140    |  103    |  10     ----------------------------<  108    4.2     |  29     |  0.57     Ca    9.2        09 Jan 2018 07:35  Mg     2.3       09 Jan 2018 07:35    TPro  7.0    /  Alb  3.2    /  TBili  0.9    /  DBili  x      /  AST  24     /  ALT  28     /  AlkPhos  77     07 Jan 2018 16:44                          16.7   7.6   )-----------( 193      ( 09 Jan 2018 07:35 )             49.6 ,                       15.8   8.7   )-----------( 175      ( 07 Jan 2018 16:44 )             47.4        TSH: Thyroid Stimulating Hormone, Serum: 8.510 uIU/mL (01-07 @ 16:44)    INR: 1.23 ratio (01-07 @ 16:44) Patient is a 88y old  Female who presents with a chief complaint of epigastric pain radiating to chest (07 Jan 2018 23:19)      PAST MEDICAL & SURGICAL HISTORY:  Hypothyroidism  Herpes zoster without complication: 2015  HTN (hypertension)  Hypercholesteremia  Atrial fibrillation  Generalized Osteoarthritis  Lumbar Vertebral Fracture  Chronic Back Pain  H/O cardiac pacemaker: [employer] placed 12/10  S/P TKR (total knee replacement): Right TKR in 5/16  S/P Colonoscopic Polypectomy: 15 years ago per pt      INTERVAL HISTORY: Resting in chair, c/o generalized discomfort, mostly in abdomen  	  MEDICATIONS:  MEDICATIONS  (STANDING):  aspirin enteric coated 81 milliGRAM(s) Oral daily  docusate sodium 100 milliGRAM(s) Oral two times a day  levothyroxine 50 MICROGram(s) Oral daily  methocarbamol 750 milliGRAM(s) Oral three times a day  metoprolol succinate ER 50 milliGRAM(s) Oral daily  pantoprazole    Tablet 40 milliGRAM(s) Oral before breakfast  simvastatin 20 milliGRAM(s) Oral at bedtime    MEDICATIONS  (PRN):  morphine  - Injectable 2 milliGRAM(s) SubCutaneous every 6 hours PRN Severe Pain (7 - 10)  oxyCODONE    5 mG/acetaminophen 325 mG 1 Tablet(s) Oral every 6 hours PRN Severe Pain (7 - 10)  traMADol 50 milliGRAM(s) Oral every 8 hours PRN Moderate Pain (4 - 6)      Vitals:  T(F): 97.4 (01-09-18 @ 05:42), Max: 98.2 (01-09-18 @ 00:29)  HR: 66 (01-09-18 @ 05:42) (53 - 66)  BP: 136/66 (01-09-18 @ 05:42) (118/79 - 136/66)  RR: 18 (01-09-18 @ 05:42) (18 - 18)  SpO2: 98% (01-09-18 @ 05:42) (96% - 98%)  Wt(kg): -- 49.5 kg    Weight (kg): 49.7 (01-08 @ 02:08)  BMI (kg/m2): 18.2 (01-08 @ 02:08)      PHYSICAL EXAM:  Neuro: Awake, responsive  CV: S1 S2 irregular  Lungs: diminished to bases  GI: Soft, BS +, ND, mild tenderness  Extremities: No edema    TELEMETRY: paced, PVCs, RSR  	    RADIOLOGY: < from: Xray Chest 1 View AP/PA. (01.07.18 @ 17:39) >  1. Stable cardiomegaly.   2. Since 1/7/18 study, there is interval development of mild pulmonary   vascular congestion.  3. New trace, bilateral pleural effusions.   4. Bibasilar atelectasis and/or pneumonia, as above.    < end of copied text >      DIAGNOSTIC TESTING:    [X ] Echocardiogram: < from: Transthoracic Echocardiogram (09.29.15 @ 16:35) >  1. Mitral annular calcification, otherwise normal mitral  valve. Mild mitral regurgitation.  2. Severely dilated left atrium.  LA volume index = 45  cc/m2.  3. Normal left ventricular internal dimensions and wall  thicknesses.  4. Normal left ventricular systolic function. No segmental  wall motion abnormalities.  5. Normal right ventricular size and function.   A device  wire is noted in the right heart.  6. Estimated right ventricular systolic pressure equals 42  mm Hg, assuming right atrial pressure equals 10 mm Hg,  consistent with mild pulmonary hypertension    < end of copied text >      LABS:	 	    CARDIAC MARKERS:  Troponin I, Serum: .018 ng/mL (01-08 @ 15:38)  Troponin I, Serum: <.015 ng/mL (01-07 @ 16:44)      09 Jan 2018 07:35    138    |  97     |  7      ----------------------------<  77     3.6     |  30     |  0.55   07 Jan 2018 16:44    140    |  103    |  10     ----------------------------<  108    4.2     |  29     |  0.57     Ca    9.2        09 Jan 2018 07:35  Mg     2.3       09 Jan 2018 07:35    TPro  7.0    /  Alb  3.2    /  TBili  0.9    /  DBili  x      /  AST  24     /  ALT  28     /  AlkPhos  77     07 Jan 2018 16:44                          16.7   7.6   )-----------( 193      ( 09 Jan 2018 07:35 )             49.6 ,                       15.8   8.7   )-----------( 175      ( 07 Jan 2018 16:44 )             47.4        TSH: Thyroid Stimulating Hormone, Serum: 8.510 uIU/mL (01-07 @ 16:44)    INR: 1.23 ratio (01-07 @ 16:44)

## 2018-01-09 NOTE — PROGRESS NOTE ADULT - ASSESSMENT
87 y/o female with a PMH of Afib (on asa for AC only secondary to falls) and s/p PPM for tachy/ak[place], HTN, HL, falls in past w/ rib fx presents w diffuse body pains including back/neck/ears/head/chest pressure x 3 days; states at times felt racing heart but has been compliant w/meds.  Pt denies and fever, chills, sob, n/v/d/c. Her generalized body pains are w/o change. In ed was in rapid afib.  Currently Afib 70-80s on floor.  Body/chest aches much improved.  TSH 8; on amio for afib  Michelle neg x 1    Sx unlikely ACS, but concerned for amio induced hypothyroidism.    - OFF amio  -c/w metoprolol to 50mg  -?endocrine evaluation  -cont to monitor on tele  - Troponin negative x 3   -2D echo  -cont synthroid  -Device interrogation done:  No reportable events 87 y/o female with a PMH of Afib (on asa for AC only secondary to falls) and s/p  AICD for NSVT , HTN, HL, falls in past w/ rib fx presents w diffuse body pains including back/neck/ears/head/chest pressure x 3 days; states at times felt racing heart but has been compliant w/meds.   TSH 8; on amio for afib/NSVT    Sx unlikely ACS, but concerned for amio induced hypothyroidism.    - OFF amio  -c/w metoprolol to 50mg,   -?endocrine evaluation  -cont to monitor on tele  - Troponin negative x 3   -2D echo  -cont synthroid  -Device interrogation done:  No reportable events 89 y/o female with a PMH of Afib (on asa for AC only secondary to falls) and s/p  AICD for NSVT , HTN, HL, falls in past w/ rib fx presents w diffuse body pains including back/neck/ears/head/chest pressure x 3 days; states at times felt racing heart but has been compliant w/meds.   TSH 8; on amio for afib/NSVT    Sx unlikely ACS, but concerned for amio induced hypothyroidism.  Now with c/o generalized discomfort mostly in abdomen, will get stat EKG, cardiac enzymes, likely need GI eval.  NSVT on tele  - OFF amio  -c/w metoprolol to 50mg,   -?endocrine evaluation  -cont to monitor on tele  - Troponin negative x 3   -2D echo  -cont synthroid  -Device interrogation done:  No reportable events   -likely need to add another antiarrhythmic 87 y/o female with a PMH of Afib (on asa for AC only secondary to falls) and s/p  AICD for NSVT , HTN, HL, falls in past w/ rib fx presents w diffuse body pains including back/neck/ears/head/chest pressure x 3 days; states at times felt racing heart but has been compliant w/meds.   TSH 8; on amio for afib/NSVT    Sx unlikely ACS, but concerned for amio induced hypothyroidism.  Now with c/o generalized discomfort mostly in abdomen, will get stat EKG, cardiac enzymes, likely need GI eval.  NSVT on tele  - OFF amio  -c/w metoprolol to 50mg,   -?endocrine evaluation  -cont to monitor on tele  - Troponin negative x 3   -2D echo  -cont synthroid  -Device interrogation done:  No reportable events   -likely need to add another antiarrhythmic with new events of NSVT

## 2018-01-09 NOTE — PROGRESS NOTE ADULT - SUBJECTIVE AND OBJECTIVE BOX
Patient is a 88y old  Female who presents with a chief complaint of epigastric pain radiating to chest (07 Jan 2018 23:19)        HPI:  Pt is a 87 y/o female  w/pmhx of   afib, htn, hld, ppm, falls in past w/rib fx presents w chest pressure x 3 days; states at times felt racing heart has been compliant w/meds.  pt denies and fever ,chills;sob, n/v/d/c  was recently admitted for a fall. her generalized body pains are w/o change. In ed was in rapid afib (07 Jan 2018 23:19)      SUBJECTIVE & OBJECTIVE: Pt seen and examined at bedside.   She is complaining of pain in the cervical spine.  Also agitated and shouting in [place] this morning attempting to get out of bed.  Given Haldol 2mg x 1.  Had an episode of abdominal pain and supraventricular tachycardia with EKG significant for st depressions in lateral leads.  Had cbc, bmp, troponin drawn all of which were wnl.  Episode likely due to agitation and anxiety.      PHYSICAL EXAM:  T(C): 36.7 (01-09-18 @ 17:25), Max: 36.8 (01-09-18 @ 00:29)  HR: 92 (01-09-18 @ 17:25) (53 - 126)  BP: 106/73 (01-09-18 @ 17:25) (91/62 - 136/66)  RR: 18 (01-09-18 @ 17:25) (18 - 18)  SpO2: 98% (01-09-18 @ 17:25) (97% - 98%)    GENERAL: mild distress 2/2 to pain  HEAD:  Atraumatic, Normocephalic  EYES: EOMI, PERRLA, conjunctiva and sclera clear  ENMT: Moist mucous membranes  NECK: Supple, No JVD  NERVOUS SYSTEM:  Alert & Oriented X3, Motor Strength 5/5 B/L upper and lower extremities; DTRs 2+ intact and symmetric  CHEST/LUNG: Clear to auscultation bilaterally; No rales, rhonchi, wheezing, or rubs  HEART: Regular rate and rhythm; No murmurs, rubs, or gallops  ABDOMEN: Soft, Nontender, Nondistended; Bowel sounds present  EXTREMITIES:  2+ Peripheral Pulses, No clubbing, cyanosis, or edema    MEDICATIONS  (STANDING):  aspirin enteric coated 81 milliGRAM(s) Oral daily  docusate sodium 100 milliGRAM(s) Oral two times a day  haloperidol    Injectable 2 milliGRAM(s) IntraMuscular once  levothyroxine 50 MICROGram(s) Oral daily  methocarbamol 750 milliGRAM(s) Oral three times a day  metoprolol     tartrate 25 milliGRAM(s) Oral three times a day  pantoprazole    Tablet 40 milliGRAM(s) Oral before breakfast  QUEtiapine 12.5 milliGRAM(s) Oral every 12 hours  simvastatin 20 milliGRAM(s) Oral at bedtime    MEDICATIONS  (PRN):  morphine  - Injectable 2 milliGRAM(s) SubCutaneous every 6 hours PRN Severe Pain (7 - 10)  oxyCODONE    5 mG/acetaminophen 325 mG 1 Tablet(s) Oral every 6 hours PRN Severe Pain (7 - 10)  traMADol 50 milliGRAM(s) Oral every 8 hours PRN Moderate Pain (4 - 6)      LABS:                        15.3   7.8   )-----------( 191      ( 09 Jan 2018 14:40 )             45.4     01-09    138  |  100  |  10  ----------------------------<  90  3.7   |  28  |  0.74    Ca    8.5      09 Jan 2018 14:40  Mg     2.3     01-09  TPro  6.9  /  Alb  3.2<L>  /  TBili  1.4<H>  /  DBili  .23<H>  /  AST  31  /  ALT  30  /  AlkPhos  80  01-0  Magnesium, Serum: 2.3 mg/dL (01-09 @ 07:35)    CARDIAC MARKERS ( 09 Jan 2018 14:40 )  .028 ng/mL / x     / 328 U/L / x     / 6.3 ng/mL  CARDIAC MARKERS ( 08 Jan 2018 15:38 )  .018 ng/mL / x     / 308 U/L / x     / 14.0 ng/mL    RECENT CULTURES:  none pending    RADIOLOGY & ADDITIONAL TESTS:  none pending    DVT/GI ppx  Discussed with pt @ bedside

## 2018-01-09 NOTE — PROGRESS NOTE ADULT - ASSESSMENT
89 y/o female with generalized pain and fall with worsening L3 compression fracture.     Problem/Plan - 1:  Problem: SVT with EKG changes  Plan; cbc,, bmp, troponin, wnl,   will not pursue CT Abdomen/Pelvis as patient's symptoms have resolved and there is low probability of mesenteric ischemia   SVT has resolved at present.  Patient is currently transitioning from Amiodarone to Lopresser for HR control    Problem/Plan - 2:  ·  Problem: Hypothyroidism due to medication.    Plan: Amiodarone likely causing hypothyroidism  will d/c Amiodarone   follow am free T4, T3  repeat TSH in 6 weeks to assess function.     Problem/Plan - 2:  ·  Problem: Rapid atrial fibrillation.    Plan: Cardiology reccs appreciated  d/c amiodarone   increasing Lopressor to 50 mg PO BID.     Problem/Plan - 3:  ·  Problem: Closed fracture of third lumbar vertebra, unspecified fracture morphology, initial encounter.    Plan: - TLSO brace per Ortho which has been delivered  today  F/u PT reccs  d/c planning    Problem/Plan - 4:  ·  Problem: Essential hypertension.  Plan: continue current management as described above.

## 2018-01-10 DIAGNOSIS — R74.8 ABNORMAL LEVELS OF OTHER SERUM ENZYMES: ICD-10-CM

## 2018-01-10 LAB — TROPONIN I SERPL-MCNC: 0.06 NG/ML — HIGH (ref 0.01–0.04)

## 2018-01-10 PROCEDURE — 99233 SBSQ HOSP IP/OBS HIGH 50: CPT

## 2018-01-10 PROCEDURE — 93010 ELECTROCARDIOGRAM REPORT: CPT

## 2018-01-10 PROCEDURE — 99232 SBSQ HOSP IP/OBS MODERATE 35: CPT

## 2018-01-10 RX ADMIN — METHOCARBAMOL 750 MILLIGRAM(S): 500 TABLET, FILM COATED ORAL at 14:47

## 2018-01-10 RX ADMIN — Medication 81 MILLIGRAM(S): at 13:56

## 2018-01-10 RX ADMIN — Medication 100 MILLIGRAM(S): at 17:27

## 2018-01-10 RX ADMIN — Medication 25 MILLIGRAM(S): at 05:23

## 2018-01-10 RX ADMIN — OXYCODONE AND ACETAMINOPHEN 1 TABLET(S): 5; 325 TABLET ORAL at 21:31

## 2018-01-10 RX ADMIN — Medication 100 MILLIGRAM(S): at 05:24

## 2018-01-10 RX ADMIN — Medication 50 MICROGRAM(S): at 05:24

## 2018-01-10 RX ADMIN — QUETIAPINE FUMARATE 12.5 MILLIGRAM(S): 200 TABLET, FILM COATED ORAL at 05:23

## 2018-01-10 RX ADMIN — METHOCARBAMOL 750 MILLIGRAM(S): 500 TABLET, FILM COATED ORAL at 05:24

## 2018-01-10 RX ADMIN — QUETIAPINE FUMARATE 12.5 MILLIGRAM(S): 200 TABLET, FILM COATED ORAL at 17:27

## 2018-01-10 RX ADMIN — OXYCODONE AND ACETAMINOPHEN 1 TABLET(S): 5; 325 TABLET ORAL at 21:01

## 2018-01-10 NOTE — PHYSICAL THERAPY INITIAL EVALUATION ADULT - IMPAIRMENTS CONTRIBUTING TO GAIT DEVIATIONS, PT EVAL
narrow base of support/impaired postural control/impaired motor control/decreased strength/impaired balance

## 2018-01-10 NOTE — PROGRESS NOTE ADULT - ASSESSMENT
89 y/o female with a PMH of Afib (on asa for AC only secondary to falls) and s/p  AICD for NSVT , HTN, HL, falls in past w/ rib fx presents w diffuse body pains including back/neck/ears/head/chest pressure x 3 days; states at times felt racing heart but has been compliant w/meds.   TSH 8; on amio for afib/NSVT.  Now d/c'd.    Sx unlikely ACS, but concerned for amio induced hypothyroidism.  C/o generalized discomfort yesterday but work-up all negative; mildly +troponin in the setting of her SVT but now in NSR.       - amio d/c'd; will re-check TSH in 6 weeks  -c/w metoprolol to 50mg,   -cont to monitor on tele  -cont to trend Michelle; trop only very minimally elevated  -2D echo  -cont synthroid  -Device interrogation done:  No reportable events   -d/w EPS; will increase bbs as needed; no need to add another antiarrythmic at this time

## 2018-01-10 NOTE — PHYSICAL THERAPY INITIAL EVALUATION ADULT - IMPAIRMENTS FOUND, PT EVAL
aerobic capacity/endurance/circulation/muscle strength/posture/joint integrity and mobility/cranial and peripheral nerve integrity/gait, locomotion, and balance/neuromotor development and sensory integration/ventilation and respiration/gas exchange

## 2018-01-10 NOTE — PHYSICAL THERAPY INITIAL EVALUATION ADULT - ADDITIONAL COMMENTS
As per patient, lives c daughter in private house c about 8 stair steps to enter the house c handrails. Another flight of stairs to her bedroom. Has a home health aide and uses a cane prior to admission.

## 2018-01-10 NOTE — PHYSICAL THERAPY INITIAL EVALUATION ADULT - PERTINENT HX OF CURRENT PROBLEM, REHAB EVAL
Patient came in c complains of epigastric pain c radiation to chest. Chart reviewed and noted – hematology: raised PT/INR,    chemistry: troponins uptrending but, per cardiology consult, minimal elevation, pacemaker interrogation unremarkable. Per Dr. Amezcua, no issues to increase activity.

## 2018-01-10 NOTE — PROGRESS NOTE ADULT - SUBJECTIVE AND OBJECTIVE BOX
Patient is a 88y old  Female who presents with a chief complaint of epigastric pain radiating to chest (07 Jan 2018 23:19)        HPI:  Pt is a 89 y/o female  w/pmhx of   afib, htn, hld, ppm, falls in past w/rib fx presents w chest pressure x 3 days; states at times felt racing heart has been compliant w/meds.  pt denies and fever ,chills;sob, n/v/d/c  was recently admitted for a fall. her generalized body pains are w/o change. In ed was in rapid afib (07 Jan 2018 23:19)      SUBJECTIVE & OBJECTIVE: Pt seen and examined at bedside. Sleepy this morning but calm and in no acute distress.  Sleepy this morning.  No acute events overnight.     PHYSICAL EXAM:  T(C): 36.9 (01-10-18 @ 05:54), Max: 36.9 (01-10-18 @ 05:54)  HR: 130 (01-10-18 @ 05:54) (92 - 130)  BP: 98/62 (01-10-18 @ 05:54) (91/62 - 106/73)  RR: 18 (01-10-18 @ 05:54) (18 - 18)  SpO2: 93% (01-10-18 @ 05:54) (93% - 98%)    GENERAL: mild distress 2/2 to pain  HEAD:  Atraumatic, Normocephalic  EYES: EOMI, PERRLA, conjunctiva and sclera clear  ENMT: Moist mucous membranes  NECK: Supple, No JVD  NERVOUS SYSTEM:  Alert & Oriented X3, Motor Strength 5/5 B/L upper and lower extremities; DTRs 2+ intact and symmetric  CHEST/LUNG: Clear to auscultation bilaterally; No rales, rhonchi, wheezing, or rubs  HEART: Regular rate and rhythm; No murmurs, rubs, or gallops  ABDOMEN: Soft, Nontender, Nondistended; Bowel sounds present  EXTREMITIES:  2+ Peripheral Pulses, No clubbing, cyanosis, or edema    MEDICATIONS  (STANDING):  aspirin enteric coated 81 milliGRAM(s) Oral daily  docusate sodium 100 milliGRAM(s) Oral two times a day  haloperidol    Injectable 2 milliGRAM(s) IntraMuscular once  levothyroxine 50 MICROGram(s) Oral daily  methocarbamol 750 milliGRAM(s) Oral three times a day  metoprolol     tartrate 25 milliGRAM(s) Oral three times a day  pantoprazole    Tablet 40 milliGRAM(s) Oral before breakfast  QUEtiapine 12.5 milliGRAM(s) Oral every 12 hours  simvastatin 20 milliGRAM(s) Oral at bedtime    MEDICATIONS  (PRN):  oxyCODONE    5 mG/acetaminophen 325 mG 1 Tablet(s) Oral every 6 hours PRN Severe Pain (7 - 10)    LABS:                        15.3   7.8   )-----------( 191      ( 09 Jan 2018 14:40 )             45.4     01-09    138  |  100  |  10  ----------------------------<  90  3.7   |  28  |  0.74    Ca    8.5      09 Jan 2018 14:40  Mg     2.3     01-09    TPro  6.9  /  Alb  3.2<L>  /  TBili  1.4<H>  /  DBili  .23<H>  /  AST  31  /  ALT  30  /  AlkPhos  80  01-09    CARDIAC MARKERS ( 10 Sunil 2018 06:32 )  .060 ng/mL / x     / x     / x     / x      CARDIAC MARKERS ( 09 Jan 2018 20:43 )  .049 ng/mL / x     / x     / x     / x      CARDIAC MARKERS ( 09 Jan 2018 14:40 )  .028 ng/mL / x     / 328 U/L / x     / 6.3 ng/mL  CARDIAC MARKERS ( 08 Jan 2018 15:38 )  .018 ng/mL / x     / 308 U/L / x     / 14.0 ng/mL      DVT/GI ppx  Discussed with pt @ bedside

## 2018-01-10 NOTE — PROGRESS NOTE ADULT - PROBLEM SELECTOR PLAN 1
most likely secondary to demand ischemia after episode of SVT  No acute intervention at this time.  Cardio reccs appreciated

## 2018-01-10 NOTE — PHYSICAL THERAPY INITIAL EVALUATION ADULT - CRITERIA FOR SKILLED THERAPEUTIC INTERVENTIONS
impairments found/rehab potential/risk reduction/prevention/anticipated discharge recommendation/functional limitations in following categories/anticipated equipment needs at discharge

## 2018-01-10 NOTE — PHYSICAL THERAPY INITIAL EVALUATION ADULT - GENERAL OBSERVATIONS, REHAB EVAL
Patient supine in bed c stable vital signs. Cardiac monitor in place. 58 bpm, paced rhythm. AAOx4. Patient denies pain or shortness of breath.

## 2018-01-10 NOTE — PROGRESS NOTE ADULT - PROBLEM SELECTOR PLAN 2
TSLO brace delivered  PT follow up required prior to d/c   O/P follow up with Orthopedics recommended

## 2018-01-10 NOTE — PHYSICAL THERAPY INITIAL EVALUATION ADULT - MODIFIED CLINICAL TEST OF SENSORY INTEGRATION IN BALANCE TEST
Barthel Index: Feeding Score _0__, Bathing Score _0__, Grooming Score _0__, Dressing Score _0__, Bowels Score _0__, Bladder Score _0__, Toilet Score _0__, Transfers Score _5__, Mobility Score _0__, Stairs Score _0__,     Total Score __5_

## 2018-01-10 NOTE — PHYSICAL THERAPY INITIAL EVALUATION ADULT - TRANSFER SAFETY CONCERNS NOTED: BED/CHAIR, REHAB EVAL
decreased balance during turns/decreased safety awareness/decreased sequencing ability/losing balance

## 2018-01-10 NOTE — PHYSICAL THERAPY INITIAL EVALUATION ADULT - PLANNED THERAPY INTERVENTIONS, PT EVAL
transfer training/stretching/gait training/joint mobilization/neuromuscular re-education/postural re-education/balance training/ROM/bed mobility training/strengthening

## 2018-01-10 NOTE — PROGRESS NOTE ADULT - SUBJECTIVE AND OBJECTIVE BOX
Patient is a 88y old  Female who presents with a chief complaint of epigastric pain radiating to chest      PAST MEDICAL & SURGICAL HISTORY:  Hypothyroidism  Herpes zoster without complication: 2015  HTN (hypertension)  Hypercholesteremia  Atrial fibrillation  Generalized Osteoarthritis  Lumbar Vertebral Fracture  Chronic Back Pain  H/O cardiac pacemaker: [employer] placed 12/10  S/P TKR (total knee replacement): Right TKR in 5/16  S/P Colonoscopic Polypectomy: 15 years ago per pt      INTERVAL HISTORY: Resting in bed, c/o generalized abd/chest/back pain yesterday; much improved today.  No further episodes of SVT... HRs remains sinus 60-80s.  	  MEDICATIONS  (STANDING):  aspirin enteric coated 81 milliGRAM(s) Oral daily  docusate sodium 100 milliGRAM(s) Oral two times a day  levothyroxine 50 MICROGram(s) Oral daily  methocarbamol 750 milliGRAM(s) Oral three times a day  metoprolol     tartrate 25 milliGRAM(s) Oral three times a day  pantoprazole    Tablet 40 milliGRAM(s) Oral before breakfast  QUEtiapine 12.5 milliGRAM(s) Oral every 12 hours  simvastatin 20 milliGRAM(s) Oral at bedtime    MEDICATIONS  (PRN):  oxyCODONE    5 mG/acetaminophen 325 mG 1 Tablet(s) Oral every 6 hours PRN Severe Pain (7 - 10)  traMADol 50 milliGRAM(s) Oral every 8 hours PRN Moderate Pain (4 - 6)        Vitals:  Vital Signs Last 24 Hrs  T(C): 36.9 (10 Sunil 2018 05:54), Max: 36.9 (10 Sunil 2018 05:54)  T(F): 98.4 (10 Sunil 2018 05:54), Max: 98.4 (10 Sunil 2018 05:54)  HR: 130 (10 Sunil 2018 05:54) (92 - 130)  BP: 98/62 (10 Sunil 2018 05:54) (91/62 - 106/73)  BP(mean): --  RR: 18 (10 Sunil 2018 05:54) (18 - 18)  SpO2: 93% (10 Sunil 2018 05:54) (93% - 98%)      PHYSICAL EXAM:  Neuro: Awake, responsive  CV: S1 S2 reg  Lungs: diminished to bases  GI: Soft, BS +, ND, mild tenderness  Extremities: No edema    TELEMETRY: paced, PVCs, RSR  	    RADIOLOGY: < from: Xray Chest 1 View AP/PA. (01.07.18 @ 17:39) >  1. Stable cardiomegaly.   2. Since 1/7/18 study, there is interval development of mild pulmonary   vascular congestion.  3. New trace, bilateral pleural effusions.   4. Bibasilar atelectasis and/or pneumonia, as above.    < end of copied text >      DIAGNOSTIC TESTING:    [X ] Echocardiogram: < from: Transthoracic Echocardiogram (09.29.15 @ 16:35) >  1. Mitral annular calcification, otherwise normal mitral  valve. Mild mitral regurgitation.  2. Severely dilated left atrium.  LA volume index = 45  cc/m2.  3. Normal left ventricular internal dimensions and wall  thicknesses.  4. Normal left ventricular systolic function. No segmental  wall motion abnormalities.  5. Normal right ventricular size and function.   A device  wire is noted in the right heart.  6. Estimated right ventricular systolic pressure equals 42  mm Hg, assuming right atrial pressure equals 10 mm Hg,  consistent with mild pulmonary hypertension    < end of copied text >      LABS:	 	    CARDIAC MARKERS:  Troponin I, Serum: .018 ng/mL (01-08 @ 15:38)  Troponin I, Serum: <.015 ng/mL (01-07 @ 16:44)      TSH: Thyroid Stimulating Hormone, Serum: 8.510 uIU/mL (01-07 @ 16:44)                          15.3   7.8   )-----------( 191      ( 09 Jan 2018 14:40 )             45.4   01-09    138  |  100  |  10  ----------------------------<  90  3.7   |  28  |  0.74    Ca    8.5      09 Jan 2018 14:40  Mg     2.3     01-09    TPro  6.9  /  Alb  3.2<L>  /  TBili  1.4<H>  /  DBili  .23<H>  /  AST  31  /  ALT  30  /  AlkPhos  80  01-09

## 2018-01-11 LAB
APPEARANCE UR: ABNORMAL
BACTERIA # UR AUTO: ABNORMAL
BILIRUB UR-MCNC: NEGATIVE — SIGNIFICANT CHANGE UP
COLOR SPEC: YELLOW — SIGNIFICANT CHANGE UP
DIFF PNL FLD: NEGATIVE — SIGNIFICANT CHANGE UP
EPI CELLS # UR: SIGNIFICANT CHANGE UP
GLUCOSE UR QL: NEGATIVE MG/DL — SIGNIFICANT CHANGE UP
HYALINE CASTS # UR AUTO: ABNORMAL /LPF
KETONES UR-MCNC: NEGATIVE — SIGNIFICANT CHANGE UP
LEUKOCYTE ESTERASE UR-ACNC: ABNORMAL
NITRITE UR-MCNC: NEGATIVE — SIGNIFICANT CHANGE UP
PH UR: 5 — SIGNIFICANT CHANGE UP (ref 5–8)
PROT UR-MCNC: 30 MG/DL
SP GR SPEC: 1.01 — SIGNIFICANT CHANGE UP (ref 1.01–1.02)
UROBILINOGEN FLD QL: NEGATIVE MG/DL — SIGNIFICANT CHANGE UP
WBC UR QL: ABNORMAL

## 2018-01-11 PROCEDURE — 99233 SBSQ HOSP IP/OBS HIGH 50: CPT

## 2018-01-11 PROCEDURE — 99232 SBSQ HOSP IP/OBS MODERATE 35: CPT

## 2018-01-11 RX ORDER — SODIUM CHLORIDE 9 MG/ML
1000 INJECTION INTRAMUSCULAR; INTRAVENOUS; SUBCUTANEOUS
Qty: 0 | Refills: 0 | Status: DISCONTINUED | OUTPATIENT
Start: 2018-01-11 | End: 2018-01-12

## 2018-01-11 RX ORDER — SODIUM CHLORIDE 9 MG/ML
500 INJECTION INTRAMUSCULAR; INTRAVENOUS; SUBCUTANEOUS
Qty: 0 | Refills: 0 | Status: DISCONTINUED | OUTPATIENT
Start: 2018-01-11 | End: 2018-01-11

## 2018-01-11 RX ORDER — SODIUM CHLORIDE 9 MG/ML
500 INJECTION INTRAMUSCULAR; INTRAVENOUS; SUBCUTANEOUS ONCE
Qty: 0 | Refills: 0 | Status: COMPLETED | OUTPATIENT
Start: 2018-01-11 | End: 2018-01-11

## 2018-01-11 RX ADMIN — PANTOPRAZOLE SODIUM 40 MILLIGRAM(S): 20 TABLET, DELAYED RELEASE ORAL at 06:06

## 2018-01-11 RX ADMIN — TRAMADOL HYDROCHLORIDE 50 MILLIGRAM(S): 50 TABLET ORAL at 07:30

## 2018-01-11 RX ADMIN — QUETIAPINE FUMARATE 12.5 MILLIGRAM(S): 200 TABLET, FILM COATED ORAL at 17:18

## 2018-01-11 RX ADMIN — METHOCARBAMOL 750 MILLIGRAM(S): 500 TABLET, FILM COATED ORAL at 21:54

## 2018-01-11 RX ADMIN — Medication 81 MILLIGRAM(S): at 11:36

## 2018-01-11 RX ADMIN — Medication 50 MICROGRAM(S): at 06:08

## 2018-01-11 RX ADMIN — OXYCODONE AND ACETAMINOPHEN 1 TABLET(S): 5; 325 TABLET ORAL at 18:20

## 2018-01-11 RX ADMIN — SIMVASTATIN 20 MILLIGRAM(S): 20 TABLET, FILM COATED ORAL at 00:02

## 2018-01-11 RX ADMIN — TRAMADOL HYDROCHLORIDE 50 MILLIGRAM(S): 50 TABLET ORAL at 07:08

## 2018-01-11 RX ADMIN — SODIUM CHLORIDE 75 MILLILITER(S): 9 INJECTION INTRAMUSCULAR; INTRAVENOUS; SUBCUTANEOUS at 16:06

## 2018-01-11 RX ADMIN — SIMVASTATIN 20 MILLIGRAM(S): 20 TABLET, FILM COATED ORAL at 21:54

## 2018-01-11 RX ADMIN — SODIUM CHLORIDE 1000 MILLILITER(S): 9 INJECTION INTRAMUSCULAR; INTRAVENOUS; SUBCUTANEOUS at 15:19

## 2018-01-11 RX ADMIN — METHOCARBAMOL 750 MILLIGRAM(S): 500 TABLET, FILM COATED ORAL at 06:06

## 2018-01-11 RX ADMIN — METHOCARBAMOL 750 MILLIGRAM(S): 500 TABLET, FILM COATED ORAL at 15:14

## 2018-01-11 RX ADMIN — Medication 100 MILLIGRAM(S): at 06:06

## 2018-01-11 RX ADMIN — SODIUM CHLORIDE 100 MILLILITER(S): 9 INJECTION INTRAMUSCULAR; INTRAVENOUS; SUBCUTANEOUS at 23:24

## 2018-01-11 RX ADMIN — Medication 100 MILLIGRAM(S): at 17:18

## 2018-01-11 RX ADMIN — QUETIAPINE FUMARATE 12.5 MILLIGRAM(S): 200 TABLET, FILM COATED ORAL at 06:07

## 2018-01-11 RX ADMIN — METHOCARBAMOL 750 MILLIGRAM(S): 500 TABLET, FILM COATED ORAL at 00:03

## 2018-01-11 RX ADMIN — OXYCODONE AND ACETAMINOPHEN 1 TABLET(S): 5; 325 TABLET ORAL at 17:18

## 2018-01-11 NOTE — PROGRESS NOTE ADULT - SUBJECTIVE AND OBJECTIVE BOX
Patient is a 88y old  Female who presents with a chief complaint of epigastric pain radiating to chest (07 Jan 2018 23:19)      PAST MEDICAL & SURGICAL HISTORY:  Hypothyroidism  Herpes zoster without complication: 2015  HTN (hypertension)  Hypercholesteremia  Atrial fibrillation  Generalized Osteoarthritis  Lumbar Vertebral Fracture  Chronic Back Pain  H/O cardiac pacemaker: [employer] placed 12/10  S/P TKR (total knee replacement): Right TKR in 5/16  S/P Colonoscopic Polypectomy: 15 years ago per pt      INTERVAL HISTORY: Resting in bed, not in any acute distress   	  MEDICATIONS:  MEDICATIONS  (STANDING):  aspirin enteric coated 81 milliGRAM(s) Oral daily  docusate sodium 100 milliGRAM(s) Oral two times a day  levothyroxine 50 MICROGram(s) Oral daily  methocarbamol 750 milliGRAM(s) Oral three times a day  metoprolol     tartrate 25 milliGRAM(s) Oral three times a day  pantoprazole    Tablet 40 milliGRAM(s) Oral before breakfast  QUEtiapine 12.5 milliGRAM(s) Oral every 12 hours  simvastatin 20 milliGRAM(s) Oral at bedtime    MEDICATIONS  (PRN):  oxyCODONE    5 mG/acetaminophen 325 mG 1 Tablet(s) Oral every 6 hours PRN Severe Pain (7 - 10)  traMADol 50 milliGRAM(s) Oral every 8 hours PRN Moderate Pain (4 - 6)      Vitals:  T(F): 97.4 (01-11-18 @ 05:14), Max: 98.2 (01-10-18 @ 23:40)  HR: 60 (01-11-18 @ 05:14) (58 - 64)  BP: 89/55 (01-11-18 @ 05:14) (82/54 - 120/73)  RR: 18 (01-11-18 @ 05:14) (18 - 18)  SpO2: 97% (01-11-18 @ 05:14) (96% - 98%)  Wt(kg): -- 49.2 kg    01-10 @ 07:01  -  01-11 @ 07:00  --------------------------------------------------------  IN:    Oral Fluid: 240 mL  Total IN: 240 mL    OUT:  Total OUT: 0 mL    Total NET: 240 mL    PHYSICAL EXAM:  Neuro: Awake, responsive  CV: S1 S2 RRR  Lungs: CTABL  GI: Soft, BS +, ND, NT  Extremities: No edema    TELEMETRY: paed/ RSR/wide complex tachycardia  	    RADIOLOGY: < from: CT Angio Chest w/ IV Cont (01.07.18 @ 18:29) >   No pulmonary embolism.  Basilar atelectasis in the left lower lobe.    < end of copied text >      DIAGNOSTIC TESTING:    [P ] Echocardiogram:       LABS:	 	    CARDIAC MARKERS:  Troponin I, Serum: .060 ng/mL (01-10 @ 06:32)  Troponin I, Serum: .049 ng/mL (01-09 @ 20:43)  Troponin I, Serum: .028 ng/mL (01-09 @ 14:40)  Troponin I, Serum: .018 ng/mL (01-08 @ 15:38)          09 Jan 2018 14:40    138    |  100    |  10     ----------------------------<  90     3.7     |  28     |  0.74   09 Jan 2018 07:35    138    |  97     |  7      ----------------------------<  77     3.6     |  30     |  0.55     Ca    8.5        09 Jan 2018 14:40    TPro  6.9    /  Alb  3.2    /  TBili  1.4    /  DBili  .23    /  AST  31     /  ALT  30     /  AlkPhos  80     09 Jan 2018 14:40                          15.3   7.8   )-----------( 191      ( 09 Jan 2018 14:40 )             45.4 ,                       16.7   7.6   )-----------( 193      ( 09 Jan 2018 07:35 )             49.6   TSH: Thyroid Stimulating Hormone, Serum: 8.510 uIU/mL (01-07 @ 16:44) Patient is a 88y old  Female who presents with a chief complaint of epigastric pain radiating to chest (07 Jan 2018 23:19)      PAST MEDICAL & SURGICAL HISTORY:  Hypothyroidism  Herpes zoster without complication: 2015  HTN (hypertension)  Hypercholesteremia  Atrial fibrillation  Generalized Osteoarthritis  Lumbar Vertebral Fracture  Chronic Back Pain  H/O cardiac pacemaker: [employer] placed 12/10  S/P TKR (total knee replacement): Right TKR in 5/16  S/P Colonoscopic Polypectomy: 15 years ago per pt      INTERVAL HISTORY: Resting in bed, not in any acute distress, denies any chest pain or sob,   	  MEDICATIONS:  MEDICATIONS  (STANDING):  aspirin enteric coated 81 milliGRAM(s) Oral daily  docusate sodium 100 milliGRAM(s) Oral two times a day  levothyroxine 50 MICROGram(s) Oral daily  methocarbamol 750 milliGRAM(s) Oral three times a day  metoprolol     tartrate 25 milliGRAM(s) Oral three times a day  pantoprazole    Tablet 40 milliGRAM(s) Oral before breakfast  QUEtiapine 12.5 milliGRAM(s) Oral every 12 hours  simvastatin 20 milliGRAM(s) Oral at bedtime    MEDICATIONS  (PRN):  oxyCODONE    5 mG/acetaminophen 325 mG 1 Tablet(s) Oral every 6 hours PRN Severe Pain (7 - 10)  traMADol 50 milliGRAM(s) Oral every 8 hours PRN Moderate Pain (4 - 6)      Vitals:  T(F): 97.4 (01-11-18 @ 05:14), Max: 98.2 (01-10-18 @ 23:40)  HR: 60 (01-11-18 @ 05:14) (58 - 64)  BP: 89/55 (01-11-18 @ 05:14) (82/54 - 120/73)  RR: 18 (01-11-18 @ 05:14) (18 - 18)  SpO2: 97% (01-11-18 @ 05:14) (96% - 98%)  Wt(kg): -- 49.2 kg    01-10 @ 07:01  -  01-11 @ 07:00  --------------------------------------------------------  IN:    Oral Fluid: 240 mL  Total IN: 240 mL    OUT:  Total OUT: 0 mL    Total NET: 240 mL    PHYSICAL EXAM:  Neuro: Awake, responsive  CV: S1 S2 RRR, + systolic murmur   Lungs: diminished to bases  GI: Soft, BS +, ND, NT  Extremities: No edema    TELEMETRY: paed/ RSR/wide complex tachycardia  	    RADIOLOGY: < from: CT Angio Chest w/ IV Cont (01.07.18 @ 18:29) >   No pulmonary embolism.  Basilar atelectasis in the left lower lobe.    < end of copied text >      DIAGNOSTIC TESTING:    [P ] Echocardiogram:       LABS:	 	    CARDIAC MARKERS:  Troponin I, Serum: .060 ng/mL (01-10 @ 06:32)  Troponin I, Serum: .049 ng/mL (01-09 @ 20:43)  Troponin I, Serum: .028 ng/mL (01-09 @ 14:40)  Troponin I, Serum: .018 ng/mL (01-08 @ 15:38)          09 Jan 2018 14:40    138    |  100    |  10     ----------------------------<  90     3.7     |  28     |  0.74   09 Jan 2018 07:35    138    |  97     |  7      ----------------------------<  77     3.6     |  30     |  0.55     Ca    8.5        09 Jan 2018 14:40    TPro  6.9    /  Alb  3.2    /  TBili  1.4    /  DBili  .23    /  AST  31     /  ALT  30     /  AlkPhos  80     09 Jan 2018 14:40                          15.3   7.8   )-----------( 191      ( 09 Jan 2018 14:40 )             45.4 ,                       16.7   7.6   )-----------( 193      ( 09 Jan 2018 07:35 )             49.6   TSH: Thyroid Stimulating Hormone, Serum: 8.510 uIU/mL (01-07 @ 16:44)

## 2018-01-11 NOTE — PROGRESS NOTE ADULT - NSHPATTENDINGPLANDISCUSS_GEN_ALL_CORE
IDR staff, patient at beside
IDR rounding staff, patient at [facility], CArdiology
CArdiology, RN, Patient at beside
IDR Staff, Patient at bedside, Daughter Bailey by phone

## 2018-01-11 NOTE — PROGRESS NOTE ADULT - SUBJECTIVE AND OBJECTIVE BOX
Patient is a 88y old  Female who presents with a chief complaint of epigastric pain radiating to chest (2018 23:19)        HPI:  Pt is a 89 y/o female  w/pmhx of   afib, htn, hld, ppm, falls in past w/rib fx presents w chest pressure x 3 days; states at times felt racing heart has been compliant w/meds.  pt denies and fever ,chills;sob, n/v/d/c  was recently admitted for a fall. her generalized body pains are w/o change. In ed was in rapid afib (2018 23:19)      SUBJECTIVE & OBJECTIVE: Pt seen and examined at bedside.  Hypotensive today but no complaints at present.     PHYSICAL EXAM:  T(C): 36.1 (18 @ 17:47), Max: 36.8 (01-10-18 @ 23:40)  HR: 59 (18 @ 17:47) (59 - 64)  BP: 108/73 (18 @ 17:47) (77/48 - 108/73)  RR: 18 (18 @ 17:47) (18 - 18)  SpO2: 97% (18 @ 17:47) (96% - 98%)    GENERAL: mild distress 2/2 to pain  HEAD:  Atraumatic, Normocephalic  EYES: EOMI, PERRLA, conjunctiva and sclera clear  ENMT: Moist mucous membranes  NECK: Supple, No JVD  NERVOUS SYSTEM:  Alert & Oriented X3, Motor Strength 5/5 B/L upper and lower extremities; DTRs 2+ intact and symmetric  CHEST/LUNG: Clear to auscultation bilaterally; No rales, rhonchi, wheezing, or rubs  HEART: Regular rate and rhythm; No murmurs, rubs, or gallops  ABDOMEN: Soft, Nontender, Nondistended; Bowel sounds present  EXTREMITIES:  2+ Peripheral Pulses, No clubbing, cyanosis, or edema    MEDICATIONS  (STANDING):  aspirin enteric coated 81 milliGRAM(s) Oral daily  docusate sodium 100 milliGRAM(s) Oral two times a day  levothyroxine 50 MICROGram(s) Oral daily  methocarbamol 750 milliGRAM(s) Oral three times a day  metoprolol     tartrate 25 milliGRAM(s) Oral three times a day  pantoprazole    Tablet 40 milliGRAM(s) Oral before breakfast  QUEtiapine 12.5 milliGRAM(s) Oral every 12 hours  simvastatin 20 milliGRAM(s) Oral at bedtime  sodium chloride 0.9%. 1000 milliLiter(s) (100 mL/Hr) IV Continuous <Continuous>    MEDICATIONS  (PRN):  oxyCODONE    5 mG/acetaminophen 325 mG 1 Tablet(s) Oral every 6 hours PRN Severe Pain (7 - 10)  traMADol 50 milliGRAM(s) Oral every 8 hours PRN Moderate Pain (4 - 6)        Urinalysis Basic - ( 2018 09:05 )    Color: Yellow / Appearance: Slightly Turbid / S.015 / pH: x  Gluc: x / Ketone: Negative  / Bili: Negative / Urobili: Negative mg/dL   Blood: x / Protein: 30 mg/dL / Nitrite: Negative   Leuk Esterase: Moderate / RBC: x / WBC 6-10   Sq Epi: x / Non Sq Epi: Few / Bacteria: Moderate      CARDIAC MARKERS ( 10 Sunil 2018 06:32 )  .060 ng/mL / x     / x     / x     / x          DVT/GI ppx  Discussed with pt @ bedside

## 2018-01-11 NOTE — PROGRESS NOTE ADULT - ASSESSMENT
89 y/o female with a PMH of Afib (on asa for AC only secondary to falls) and s/p  AICD for NSVT , HTN, HL, falls in past w/ rib fx presents w diffuse body pains including back/neck/ears/head/chest pressure x 3 days; states at times felt racing heart but has been compliant w/meds.   TSH 8; on amio for afib/NSVT.  Now d/c'd.    Sx unlikely ACS, but concerned for amio induced hypothyroidism.  C/o generalized discomfort yesterday but work-up all negative; mildly +troponin in the setting of her SVT but now in NSR.       - amio d/c'd; will re-check TSH in 6 weeks  -c/w metoprolol    -cont to monitor on tele  -cont to trend Michelle; trop only very minimally elevated  -2D echo  -cont synthroid  -Device interrogation done:  No reportable events   -d/w EPS; will increase bbs as needed; no need to add another antiarrythmic at this time

## 2018-01-11 NOTE — PROGRESS NOTE ADULT - ASSESSMENT
89 y/o female with generalized pain and fall with worsening L3 compression fracture.     Problem/Plan - 1:  ·  Problem: Hypotension  Plan: will give IVF  cc bolus then 100 cc/hr until am    Problem/Plan - 2:  Problem: Rapid afib  Plan: BB to 25 mg TID, cardiac monitor reviwed max     Problem/Plan 3:   ·  Problem: Closed fracture of third lumbar vertebra, unspecified fracture morphology, initial encounter.  Plan: TSLO brace delivered  PT follow up required prior to d/c   O/P follow up with Orthopedics recommended.     Problem/Plan - 4:  ·  Problem: Other chronic back pain.  Plan: as above  Pain control with oral analgesic and PT follow up.     Problem/Plan - 5:  ·  Problem: Hypothyroidism due to medication.  Plan: Amiodarone has been discontinued  follow up TSH ini 6 weeks.     Problem/Plan - 6:  ·  Problem: Essential hypertension.  Plan: currently well controlled on oral medications.

## 2018-01-12 ENCOUNTER — TRANSCRIPTION ENCOUNTER (OUTPATIENT)
Age: 83
End: 2018-01-12

## 2018-01-12 VITALS — SYSTOLIC BLOOD PRESSURE: 94 MMHG | HEART RATE: 60 BPM | DIASTOLIC BLOOD PRESSURE: 53 MMHG

## 2018-01-12 PROCEDURE — 99239 HOSP IP/OBS DSCHRG MGMT >30: CPT

## 2018-01-12 RX ORDER — CEFUROXIME AXETIL 250 MG
250 TABLET ORAL EVERY 12 HOURS
Qty: 0 | Refills: 0 | Status: DISCONTINUED | OUTPATIENT
Start: 2018-01-12 | End: 2018-01-12

## 2018-01-12 RX ORDER — CEFUROXIME AXETIL 250 MG
1 TABLET ORAL
Qty: 0 | Refills: 0 | DISCHARGE
Start: 2018-01-12 | End: 2018-01-19

## 2018-01-12 RX ADMIN — Medication 100 MILLIGRAM(S): at 17:16

## 2018-01-12 RX ADMIN — Medication 250 MILLIGRAM(S): at 17:17

## 2018-01-12 RX ADMIN — PANTOPRAZOLE SODIUM 40 MILLIGRAM(S): 20 TABLET, DELAYED RELEASE ORAL at 05:55

## 2018-01-12 RX ADMIN — OXYCODONE AND ACETAMINOPHEN 1 TABLET(S): 5; 325 TABLET ORAL at 02:08

## 2018-01-12 RX ADMIN — METHOCARBAMOL 750 MILLIGRAM(S): 500 TABLET, FILM COATED ORAL at 05:51

## 2018-01-12 RX ADMIN — QUETIAPINE FUMARATE 12.5 MILLIGRAM(S): 200 TABLET, FILM COATED ORAL at 17:17

## 2018-01-12 RX ADMIN — OXYCODONE AND ACETAMINOPHEN 1 TABLET(S): 5; 325 TABLET ORAL at 01:38

## 2018-01-12 RX ADMIN — Medication 100 MILLIGRAM(S): at 05:51

## 2018-01-12 RX ADMIN — Medication 50 MICROGRAM(S): at 05:52

## 2018-01-12 RX ADMIN — QUETIAPINE FUMARATE 12.5 MILLIGRAM(S): 200 TABLET, FILM COATED ORAL at 05:51

## 2018-01-12 RX ADMIN — Medication 81 MILLIGRAM(S): at 11:35

## 2018-01-12 RX ADMIN — METHOCARBAMOL 750 MILLIGRAM(S): 500 TABLET, FILM COATED ORAL at 14:41

## 2018-01-12 NOTE — DISCHARGE NOTE ADULT - SECONDARY DIAGNOSIS.
Paroxysmal atrial fibrillation Acquired hypothyroidism Generalized osteoarthritis Essential hypertension Acute cystitis without hematuria

## 2018-01-12 NOTE — DISCHARGE NOTE ADULT - HOSPITAL COURSE
Patient is a 88y old  Female who presents with a chief complaint of epigastric pain radiating to chest (07 Jan 2018 23:19)  HPI Pt is a 87 y/o female  w/pmhx of   afib, htn, hld, ppm, falls in past w/rib fx presents w chest pressure x 3 days; states at times felt racing heart has been compliant w/meds.  pt denies and fever ,chills;sob, n/v/d/c  was recently admitted for a fall. her generalized body pains are w/o change. In ed was in rapid afib (07 Jan 2018 23:19)    SUBJECTIVE & OBJECTIVE: Pt seen and examined at bedside.  Doing well today.  No acute events overnight. Doing well at time of discharge.  T(C): 35.7 (01-12-18 @ 12:48), Max: 36.6 (01-12-18 @ 00:33)  HR: 60 (01-12-18 @ 14:34) (30 - 60)  BP: 94/53 (01-12-18 @ 14:34) (93/66 - 108/73)  RR: 18 (01-12-18 @ 12:48) (18 - 18)  SpO2: 98% (01-12-18 @ 12:48) (96% - 98%)  GENERAL: mild distress 2/2 to pain  HEAD:  Atraumatic, Normocephalic  EYES: EOMI, PERRLA, conjunctiva and sclera clear  ENMT: Moist mucous membranes  NECK: Supple, No JVD  NERVOUS SYSTEM:  Alert & Oriented X3, Motor Strength 5/5 B/L upper and lower extremities; DTRs 2+ intact and symmetric  CHEST/LUNG: Clear to auscultation bilaterally; No rales, rhonchi, wheezing, or rubs  HEART: Regular rate and rhythm; No murmurs, rubs, or gallops  ABDOMEN: Soft, Nontender, Nondistended; Bowel sounds present  EXTREMITIES:  2+ Peripheral Pulses, No clubbing, cyanosis, or edema

## 2018-01-12 NOTE — DISCHARGE NOTE ADULT - CARE PLAN
Principal Discharge DX:	Closed fracture of third lumbar vertebra, unspecified fracture morphology, initial encounter  Goal:	improved ambulation with back pain  Instructions for follow-up, activity and diet:	1.  Follow up with PMD in 1-2 weeks  2.  Activity as tolerated  3.  Follow up with Orthopedics in 3 weeks  Secondary Diagnosis:	Paroxysmal atrial fibrillation  Secondary Diagnosis:	Acquired hypothyroidism  Secondary Diagnosis:	Generalized osteoarthritis  Secondary Diagnosis:	Essential hypertension Principal Discharge DX:	Closed fracture of third lumbar vertebra, unspecified fracture morphology, initial encounter  Goal:	improved ambulation with back pain  Instructions for follow-up, activity and diet:	1.  Follow up with PMD in 1-2 weeks  2.  Activity as tolerated  3.  Follow up with Orthopedics in 3 weeks  Secondary Diagnosis:	Paroxysmal atrial fibrillation  Secondary Diagnosis:	Acquired hypothyroidism  Secondary Diagnosis:	Generalized osteoarthritis  Secondary Diagnosis:	Essential hypertension  Secondary Diagnosis:	Acute cystitis without hematuria  Instructions for follow-up, activity and diet:	Plan to treat for 7 days Principal Discharge DX:	Closed fracture of third lumbar vertebra, unspecified fracture morphology, initial encounter  Goal:	improved ambulation with back pain  Assessment and plan of treatment:	1.  Follow up with PMD in 1-2 weeks  2.  Activity as tolerated  3.  Follow up with Orthopedics in 3 weeks  Secondary Diagnosis:	Paroxysmal atrial fibrillation  Secondary Diagnosis:	Acquired hypothyroidism  Secondary Diagnosis:	Generalized osteoarthritis  Secondary Diagnosis:	Essential hypertension  Secondary Diagnosis:	Acute cystitis without hematuria  Assessment and plan of treatment:	Plan to treat for 7 days

## 2018-01-12 NOTE — DISCHARGE NOTE ADULT - MEDICATION SUMMARY - MEDICATIONS TO TAKE
I will START or STAY ON the medications listed below when I get home from the hospital:    LSO  -- Lumbar vertebral compression Fracture  ICD: S32.010B  Shell: 99  -- Indication: For L3 Compression Fracture    traMADol 50 mg oral tablet  -- 1 tab(s) by mouth every 8 hours, As Needed  - for moderate pain MDD:3  -- Indication: For Back Pain    oxyCODONE-acetaminophen 5 mg-325 mg oral tablet  -- 1 tab(s) by mouth every 6 hours, As needed, Severe Pain (7 - 10) MDD:4  -- Indication: For Back Pain    aspirin 81 mg oral delayed release tablet  -- 1 tab(s) by mouth once a day  -- Indication: For Essential hypertension    amiodarone 200 mg oral tablet  -- 1 tab(s) by mouth once a day  -- Indication: For Atrial Fibrillation    simvastatin 20 mg oral tablet  -- 1 tab(s) by mouth once a day (at bedtime)  -- Indication: For Hypercholesteremia    QUEtiapine 25 mg oral tablet  -- 0.5 tab(s) by mouth 2 times a day  -- Indication: For Agitation     metoprolol succinate 25 mg oral tablet, extended release  -- 1 tab(s) by mouth once a day  -- Indication: For Essential hypertension    docusate sodium 100 mg oral capsule  -- 1 cap(s) by mouth 2 times a day  -- Indication: For Constipation    esomeprazole 40 mg oral delayed release capsule  -- 1 cap(s) by mouth once a day  -- It is very important that you take or use this exactly as directed.  Do not skip doses or discontinue unless directed by your doctor.  Obtain medical advice before taking any non-prescription drugs as some may affect the action of this medication.  Swallow whole.  Do not crush.  Take medication on an empty stomach 1 hour before or 2 to 3 hours after a meal unless otherwise directed by your doctor.    -- Indication: For GERD    levothyroxine 50 mcg (0.05 mg) oral tablet  -- 1 tab(s) by mouth once a day  -- Indication: For Hypothyroidism due to medication I will START or STAY ON the medications listed below when I get home from the hospital:    LSO  -- Lumbar vertebral compression Fracture  ICD: S32.010B  Shell: 99  -- Indication: For Back Pain    traMADol 50 mg oral tablet  -- 1 tab(s) by mouth every 8 hours, As Needed  - for moderate pain MDD:3  -- Indication: For Back Pain    oxyCODONE-acetaminophen 5 mg-325 mg oral tablet  -- 1 tab(s) by mouth every 6 hours, As needed, Severe Pain (7 - 10) MDD:4  -- Indication: For Back Pain    aspirin 81 mg oral delayed release tablet  -- 1 tab(s) by mouth once a day  -- Indication: For Essential hypertension    amiodarone 200 mg oral tablet  -- 1 tab(s) by mouth once a day  -- Indication: For Atrial Fibrillation    simvastatin 20 mg oral tablet  -- 1 tab(s) by mouth once a day (at bedtime)  -- Indication: For Hypercholesteremia    QUEtiapine 25 mg oral tablet  -- 0.5 tab(s) by mouth 2 times a day  -- Indication: For Dementia    metoprolol succinate 25 mg oral tablet, extended release  -- 1 tab(s) by mouth once a day  -- Indication: For Essential hypertension    docusate sodium 100 mg oral capsule  -- 1 cap(s) by mouth 2 times a day  -- Indication: For Constipation    esomeprazole 40 mg oral delayed release capsule  -- 1 cap(s) by mouth once a day  -- It is very important that you take or use this exactly as directed.  Do not skip doses or discontinue unless directed by your doctor.  Obtain medical advice before taking any non-prescription drugs as some may affect the action of this medication.  Swallow whole.  Do not crush.  Take medication on an empty stomach 1 hour before or 2 to 3 hours after a meal unless otherwise directed by your doctor.    -- Indication: For Gerd    levothyroxine 50 mcg (0.05 mg) oral tablet  -- 1 tab(s) by mouth once a day  -- Indication: For Hypothyroidism due to medication I will START or STAY ON the medications listed below when I get home from the hospital:    LSO  -- Lumbar vertebral compression Fracture  ICD: S32.010B  Shell: 99  -- Indication: For Back Pain    traMADol 50 mg oral tablet  -- 1 tab(s) by mouth every 8 hours, As Needed  - for moderate pain MDD:3  -- Indication: For Back Pain    oxyCODONE-acetaminophen 5 mg-325 mg oral tablet  -- 1 tab(s) by mouth every 6 hours, As needed, Severe Pain (7 - 10) MDD:4  -- Indication: For Back Pain    aspirin 81 mg oral delayed release tablet  -- 1 tab(s) by mouth once a day  -- Indication: For Essential hypertension    amiodarone 200 mg oral tablet  -- 1 tab(s) by mouth once a day  -- Indication: For Atrial Fibrillation    simvastatin 20 mg oral tablet  -- 1 tab(s) by mouth once a day (at bedtime)  -- Indication: For Hypercholesteremia    QUEtiapine 25 mg oral tablet  -- 0.5 tab(s) by mouth 2 times a day  -- Indication: For Dementia    metoprolol succinate 25 mg oral tablet, extended release  -- 1 tab(s) by mouth once a day  -- Indication: For Essential hypertension    Ceftin 250 mg oral tablet  -- 1 tab(s) by mouth every 12 hours  -- Indication: For UTI    docusate sodium 100 mg oral capsule  -- 1 cap(s) by mouth 2 times a day  -- Indication: For Constipation    esomeprazole 40 mg oral delayed release capsule  -- 1 cap(s) by mouth once a day  -- It is very important that you take or use this exactly as directed.  Do not skip doses or discontinue unless directed by your doctor.  Obtain medical advice before taking any non-prescription drugs as some may affect the action of this medication.  Swallow whole.  Do not crush.  Take medication on an empty stomach 1 hour before or 2 to 3 hours after a meal unless otherwise directed by your doctor.    -- Indication: For Gerd    levothyroxine 50 mcg (0.05 mg) oral tablet  -- 1 tab(s) by mouth once a day  -- Indication: For Hypothyroidism due to medication

## 2018-01-12 NOTE — DISCHARGE NOTE ADULT - PATIENT PORTAL LINK FT
“You can access the [employer] Patient Portal, offered by [facility], by registering with the following website: [email or web address]”

## 2018-01-12 NOTE — DISCHARGE NOTE ADULT - PLAN OF CARE
improved ambulation with back pain 1.  Follow up with PMD in 1-2 weeks  2.  Activity as tolerated  3.  Follow up with Orthopedics in 3 weeks Plan to treat for 7 days

## 2018-01-12 NOTE — DISCHARGE NOTE ADULT - CARE PROVIDERS DIRECT ADDRESSES
,DirectAddress_Unknown,[email or web address]

## 2018-01-12 NOTE — DISCHARGE NOTE ADULT - MEDICATION SUMMARY - MEDICATIONS TO STOP TAKING
I will STOP taking the medications listed below when I get home from the hospital:     mg oral tablet  -- 1 tab(s) by mouth every 8 hours as needed for pain  -- Do not take this drug if you are pregnant.  It is very important that you take or use this exactly as directed.  Do not skip doses or discontinue unless directed by your doctor.  May cause drowsiness or dizziness.  Obtain medical advice before taking any non-prescription drugs as some may affect the action of this medication.  Take with food or milk.    methocarbamol 750 mg oral tablet  -- 1 tab(s) by mouth 3 times a day  -- May cause drowsiness.  Alcohol may intensify this effect.  Use care when operating dangerous machinery.

## 2018-01-12 NOTE — DISCHARGE NOTE ADULT - CARE PROVIDER_API CALL
Jc Son (DO), Orthopaedic Surgery Orthopaedics Surgery  [address] [place] [address]  Phone: [phone]  Fax: [phone]    Ryan Sexton), Cardiovascular Disease; Internal Medicine; Nuclear Cardiology  [address] [place] [address]  Phone: [phone]  Fax: [phone]

## 2018-01-17 DIAGNOSIS — Z95.810 PRESENCE OF AUTOMATIC (IMPLANTABLE) CARDIAC DEFIBRILLATOR: ICD-10-CM

## 2018-01-17 DIAGNOSIS — F41.9 ANXIETY DISORDER, UNSPECIFIED: ICD-10-CM

## 2018-01-17 DIAGNOSIS — I47.1 SUPRAVENTRICULAR TACHYCARDIA: ICD-10-CM

## 2018-01-17 DIAGNOSIS — Z91.81 HISTORY OF FALLING: ICD-10-CM

## 2018-01-17 DIAGNOSIS — I24.9 ACUTE ISCHEMIC HEART DISEASE, UNSPECIFIED: ICD-10-CM

## 2018-01-17 DIAGNOSIS — Z96.651 PRESENCE OF RIGHT ARTIFICIAL KNEE JOINT: ICD-10-CM

## 2018-01-17 DIAGNOSIS — I21.4 NON-ST ELEVATION (NSTEMI) MYOCARDIAL INFARCTION: ICD-10-CM

## 2018-01-17 DIAGNOSIS — M48.56XA COLLAPSED VERTEBRA, NOT ELSEWHERE CLASSIFIED, LUMBAR REGION, INITIAL ENCOUNTER FOR FRACTURE: ICD-10-CM

## 2018-01-17 DIAGNOSIS — I24.8 OTHER FORMS OF ACUTE ISCHEMIC HEART DISEASE: ICD-10-CM

## 2018-01-17 DIAGNOSIS — K21.9 GASTRO-ESOPHAGEAL REFLUX DISEASE WITHOUT ESOPHAGITIS: ICD-10-CM

## 2018-01-17 DIAGNOSIS — Z79.82 LONG TERM (CURRENT) USE OF ASPIRIN: ICD-10-CM

## 2018-01-17 DIAGNOSIS — I10 ESSENTIAL (PRIMARY) HYPERTENSION: ICD-10-CM

## 2018-01-17 DIAGNOSIS — I48.0 PAROXYSMAL ATRIAL FIBRILLATION: ICD-10-CM

## 2018-01-17 DIAGNOSIS — R07.89 OTHER CHEST PAIN: ICD-10-CM

## 2018-01-17 DIAGNOSIS — M85.80 OTHER SPECIFIED DISORDERS OF BONE DENSITY AND STRUCTURE, UNSPECIFIED SITE: ICD-10-CM

## 2018-01-17 DIAGNOSIS — E03.9 HYPOTHYROIDISM, UNSPECIFIED: ICD-10-CM

## 2018-01-17 DIAGNOSIS — E78.00 PURE HYPERCHOLESTEROLEMIA, UNSPECIFIED: ICD-10-CM

## 2018-01-17 DIAGNOSIS — K59.00 CONSTIPATION, UNSPECIFIED: ICD-10-CM

## 2018-01-17 DIAGNOSIS — F03.90 UNSPECIFIED DEMENTIA WITHOUT BEHAVIORAL DISTURBANCE: ICD-10-CM

## 2018-01-17 DIAGNOSIS — N39.0 URINARY TRACT INFECTION, SITE NOT SPECIFIED: ICD-10-CM

## 2018-01-17 DIAGNOSIS — M54.9 DORSALGIA, UNSPECIFIED: ICD-10-CM

## 2018-03-15 ENCOUNTER — EMERGENCY (EMERGENCY)
Facility: HOSPITAL | Age: 83
LOS: 0 days | Discharge: HOME | End: 2018-03-15
Attending: EMERGENCY MEDICINE
Payer: MEDICARE

## 2018-03-15 VITALS
SYSTOLIC BLOOD PRESSURE: 142 MMHG | HEART RATE: 70 BPM | HEIGHT: 65 IN | DIASTOLIC BLOOD PRESSURE: 99 MMHG | RESPIRATION RATE: 18 BRPM | OXYGEN SATURATION: 96 % | WEIGHT: 117.95 LBS | TEMPERATURE: 99 F

## 2018-03-15 VITALS
TEMPERATURE: 99 F | SYSTOLIC BLOOD PRESSURE: 118 MMHG | OXYGEN SATURATION: 97 % | HEART RATE: 59 BPM | DIASTOLIC BLOOD PRESSURE: 66 MMHG | RESPIRATION RATE: 17 BRPM

## 2018-03-15 DIAGNOSIS — Z96.659 PRESENCE OF UNSPECIFIED ARTIFICIAL KNEE JOINT: Chronic | ICD-10-CM

## 2018-03-15 DIAGNOSIS — Z95.0 PRESENCE OF CARDIAC PACEMAKER: Chronic | ICD-10-CM

## 2018-03-15 LAB
ALBUMIN SERPL ELPH-MCNC: 3.3 G/DL — SIGNIFICANT CHANGE UP (ref 3.3–5)
ALP SERPL-CCNC: 76 U/L — SIGNIFICANT CHANGE UP (ref 40–120)
ALT FLD-CCNC: 15 U/L — SIGNIFICANT CHANGE UP (ref 12–78)
ANION GAP SERPL CALC-SCNC: 7 MMOL/L — SIGNIFICANT CHANGE UP (ref 5–17)
APPEARANCE UR: CLEAR — SIGNIFICANT CHANGE UP
AST SERPL-CCNC: 20 U/L — SIGNIFICANT CHANGE UP (ref 15–37)
BACTERIA # UR AUTO: ABNORMAL
BASOPHILS # BLD AUTO: 0.02 K/UL — SIGNIFICANT CHANGE UP (ref 0–0.2)
BASOPHILS NFR BLD AUTO: 0.4 % — SIGNIFICANT CHANGE UP (ref 0–2)
BILIRUB SERPL-MCNC: 1.1 MG/DL — SIGNIFICANT CHANGE UP (ref 0.2–1.2)
BILIRUB UR-MCNC: NEGATIVE — SIGNIFICANT CHANGE UP
BUN SERPL-MCNC: 13 MG/DL — SIGNIFICANT CHANGE UP (ref 7–23)
CALCIUM SERPL-MCNC: 8.4 MG/DL — LOW (ref 8.5–10.1)
CHLORIDE SERPL-SCNC: 105 MMOL/L — SIGNIFICANT CHANGE UP (ref 96–108)
CK MB CFR SERPL CALC: 1.5 NG/ML — SIGNIFICANT CHANGE UP (ref 0.5–3.6)
CO2 SERPL-SCNC: 27 MMOL/L — SIGNIFICANT CHANGE UP (ref 22–31)
COLOR SPEC: YELLOW — SIGNIFICANT CHANGE UP
CREAT SERPL-MCNC: 0.71 MG/DL — SIGNIFICANT CHANGE UP (ref 0.5–1.3)
DIFF PNL FLD: ABNORMAL
EOSINOPHIL # BLD AUTO: 0 K/UL — SIGNIFICANT CHANGE UP (ref 0–0.5)
EOSINOPHIL NFR BLD AUTO: 0 % — SIGNIFICANT CHANGE UP (ref 0–6)
EPI CELLS # UR: SIGNIFICANT CHANGE UP
GLUCOSE SERPL-MCNC: 142 MG/DL — HIGH (ref 70–99)
GLUCOSE UR QL: NEGATIVE MG/DL — SIGNIFICANT CHANGE UP
HCT VFR BLD CALC: 42.7 % — SIGNIFICANT CHANGE UP (ref 34.5–45)
HGB BLD-MCNC: 13.9 G/DL — SIGNIFICANT CHANGE UP (ref 11.5–15.5)
IMM GRANULOCYTES NFR BLD AUTO: 0.4 % — SIGNIFICANT CHANGE UP (ref 0–1.5)
KETONES UR-MCNC: NEGATIVE — SIGNIFICANT CHANGE UP
LEUKOCYTE ESTERASE UR-ACNC: ABNORMAL
LYMPHOCYTES # BLD AUTO: 0.71 K/UL — LOW (ref 1–3.3)
LYMPHOCYTES # BLD AUTO: 13.5 % — SIGNIFICANT CHANGE UP (ref 13–44)
MAGNESIUM SERPL-MCNC: 2.1 MG/DL — SIGNIFICANT CHANGE UP (ref 1.6–2.6)
MCHC RBC-ENTMCNC: 32.3 PG — SIGNIFICANT CHANGE UP (ref 27–34)
MCHC RBC-ENTMCNC: 32.6 GM/DL — SIGNIFICANT CHANGE UP (ref 32–36)
MCV RBC AUTO: 99.1 FL — SIGNIFICANT CHANGE UP (ref 80–100)
MONOCYTES # BLD AUTO: 0.26 K/UL — SIGNIFICANT CHANGE UP (ref 0–0.9)
MONOCYTES NFR BLD AUTO: 4.9 % — SIGNIFICANT CHANGE UP (ref 2–14)
NEUTROPHILS # BLD AUTO: 4.25 K/UL — SIGNIFICANT CHANGE UP (ref 1.8–7.4)
NEUTROPHILS NFR BLD AUTO: 80.8 % — HIGH (ref 43–77)
NITRITE UR-MCNC: NEGATIVE — SIGNIFICANT CHANGE UP
NRBC # BLD: 0 /100 WBCS — SIGNIFICANT CHANGE UP (ref 0–0)
PH UR: 7 — SIGNIFICANT CHANGE UP (ref 5–8)
PLATELET # BLD AUTO: 164 K/UL — SIGNIFICANT CHANGE UP (ref 150–400)
POTASSIUM SERPL-MCNC: 4.2 MMOL/L — SIGNIFICANT CHANGE UP (ref 3.5–5.3)
POTASSIUM SERPL-SCNC: 4.2 MMOL/L — SIGNIFICANT CHANGE UP (ref 3.5–5.3)
PROT SERPL-MCNC: 7.2 GM/DL — SIGNIFICANT CHANGE UP (ref 6–8.3)
PROT UR-MCNC: 30 MG/DL
RBC # BLD: 4.31 M/UL — SIGNIFICANT CHANGE UP (ref 3.8–5.2)
RBC # FLD: 13.6 % — SIGNIFICANT CHANGE UP (ref 10.3–14.5)
RBC CASTS # UR COMP ASSIST: ABNORMAL /HPF (ref 0–4)
SODIUM SERPL-SCNC: 139 MMOL/L — SIGNIFICANT CHANGE UP (ref 135–145)
SP GR SPEC: 1.01 — SIGNIFICANT CHANGE UP (ref 1.01–1.02)
TROPONIN I SERPL-MCNC: <.015 NG/ML — SIGNIFICANT CHANGE UP (ref 0.01–0.04)
UROBILINOGEN FLD QL: 1 MG/DL
WBC # BLD: 5.26 K/UL — SIGNIFICANT CHANGE UP (ref 3.8–10.5)
WBC # FLD AUTO: 5.26 K/UL — SIGNIFICANT CHANGE UP (ref 3.8–10.5)
WBC UR QL: ABNORMAL

## 2018-03-15 PROCEDURE — 71275 CT ANGIOGRAPHY CHEST: CPT | Mod: 26

## 2018-03-15 PROCEDURE — 93010 ELECTROCARDIOGRAM REPORT: CPT

## 2018-03-15 PROCEDURE — 74174 CTA ABD&PLVS W/CONTRAST: CPT | Mod: 26

## 2018-03-15 PROCEDURE — 99285 EMERGENCY DEPT VISIT HI MDM: CPT

## 2018-03-15 RX ORDER — KETOROLAC TROMETHAMINE 30 MG/ML
15 SYRINGE (ML) INJECTION ONCE
Qty: 0 | Refills: 0 | Status: DISCONTINUED | OUTPATIENT
Start: 2018-03-15 | End: 2018-03-15

## 2018-03-15 RX ORDER — CEFTRIAXONE 500 MG/1
1 INJECTION, POWDER, FOR SOLUTION INTRAMUSCULAR; INTRAVENOUS ONCE
Qty: 0 | Refills: 0 | Status: COMPLETED | OUTPATIENT
Start: 2018-03-15 | End: 2018-03-15

## 2018-03-15 RX ORDER — SODIUM CHLORIDE 9 MG/ML
1000 INJECTION INTRAMUSCULAR; INTRAVENOUS; SUBCUTANEOUS ONCE
Qty: 0 | Refills: 0 | Status: COMPLETED | OUTPATIENT
Start: 2018-03-15 | End: 2018-03-15

## 2018-03-15 RX ORDER — MORPHINE SULFATE 50 MG/1
3 CAPSULE, EXTENDED RELEASE ORAL ONCE
Qty: 0 | Refills: 0 | Status: DISCONTINUED | OUTPATIENT
Start: 2018-03-15 | End: 2018-03-15

## 2018-03-15 RX ORDER — CEFPODOXIME PROXETIL 100 MG
2 TABLET ORAL
Qty: 28 | Refills: 0
Start: 2018-03-15 | End: 2018-03-21

## 2018-03-15 RX ORDER — SODIUM CHLORIDE 9 MG/ML
2000 INJECTION INTRAMUSCULAR; INTRAVENOUS; SUBCUTANEOUS ONCE
Qty: 0 | Refills: 0 | Status: COMPLETED | OUTPATIENT
Start: 2018-03-15 | End: 2018-03-15

## 2018-03-15 RX ORDER — OXYCODONE AND ACETAMINOPHEN 5; 325 MG/1; MG/1
1 TABLET ORAL ONCE
Qty: 0 | Refills: 0 | Status: DISCONTINUED | OUTPATIENT
Start: 2018-03-15 | End: 2018-03-15

## 2018-03-15 RX ADMIN — SODIUM CHLORIDE 1000 MILLILITER(S): 9 INJECTION INTRAMUSCULAR; INTRAVENOUS; SUBCUTANEOUS at 16:32

## 2018-03-15 RX ADMIN — MORPHINE SULFATE 3 MILLIGRAM(S): 50 CAPSULE, EXTENDED RELEASE ORAL at 10:35

## 2018-03-15 RX ADMIN — CEFTRIAXONE 100 GRAM(S): 500 INJECTION, POWDER, FOR SOLUTION INTRAMUSCULAR; INTRAVENOUS at 10:40

## 2018-03-15 RX ADMIN — MORPHINE SULFATE 3 MILLIGRAM(S): 50 CAPSULE, EXTENDED RELEASE ORAL at 09:19

## 2018-03-15 RX ADMIN — Medication 15 MILLIGRAM(S): at 10:35

## 2018-03-15 RX ADMIN — OXYCODONE AND ACETAMINOPHEN 1 TABLET(S): 5; 325 TABLET ORAL at 14:43

## 2018-03-15 RX ADMIN — Medication 15 MILLIGRAM(S): at 09:19

## 2018-03-15 RX ADMIN — OXYCODONE AND ACETAMINOPHEN 1 TABLET(S): 5; 325 TABLET ORAL at 16:31

## 2018-03-15 RX ADMIN — SODIUM CHLORIDE 2000 MILLILITER(S): 9 INJECTION INTRAMUSCULAR; INTRAVENOUS; SUBCUTANEOUS at 13:28

## 2018-03-15 NOTE — ED PROVIDER NOTE - OBJECTIVE STATEMENT
Pertinent PMH/PSH/FHx/SHx and Review of Systems contained within:  88F hx of htn pw back pain on the left x4 days. feels it between the inframmary fold and the hip, posterior axillary line. patient thinks the pain was caused by being pulled aggressively while in the hospital last week by the nursing staff. Patient denies numbness, tingling or weakness of the lower extremity and denies retention or incontinence of the bowel or bladder. she notes that 2 days into the pain, her pmd gave her tramadol, which she does not think is working. the pain comes in spurts of intense pain. no vomiting, no fever, no cp, no sob  Fh and Sh not otherwise contributory  ROS otherwise negative

## 2018-03-15 NOTE — ED PROVIDER NOTE - MEDICAL DECISION MAKING DETAILS
patient pw left back, flank pain. patient pw left back, flank pain. cta shows no aaa or stone. there is worsening of pre-existing compression deformities. no neurologic involvement. after treatment, patient is feeling better. will dc home and spine follow up. patient pw left back, flank pain. cta shows no aaa or stone. there is worsening of pre-existing compression deformities. no neurologic involvement. after treatment, patient is feeling better. will dc home and spine follow up.   upon initial dc, bp decreased but responded well with fluids. okay for dc at 730pm.

## 2018-03-15 NOTE — ED ADULT NURSE NOTE - PSH
H/O cardiac pacemaker  [employer] placed 12/10  S/P Colonoscopic Polypectomy  15 years ago per pt  S/P TKR (total knee replacement)  Right TKR in 5/16

## 2018-03-15 NOTE — ED ADULT NURSE REASSESSMENT NOTE - NS ED NURSE REASSESS COMMENT FT1
pt has no one to take care of her would be a possible social admit
received patient back from lunch was preparing for discharge bp 85/61 heart rate 124 patient placed into bed 3 on monitor informed md ekg to be done by shayla patient has pacemaker heart rate down to normal 65 no chest pain
vitals stable   report to next nurse  medicated floor pain  md talked with daughter
1930 assumed care a this time, patient denies pain, no complaints made, a&o2 noted with confusion

## 2018-03-15 NOTE — ED ADULT NURSE NOTE - OBJECTIVE STATEMENT
patient came in for back pain for a few days history of back problem acute injury . lives with son and his family seen by md ovalle left a/c #20 blood and urine sent medicated for pain. feels much better post medication

## 2018-03-16 DIAGNOSIS — M54.9 DORSALGIA, UNSPECIFIED: ICD-10-CM

## 2018-03-16 DIAGNOSIS — Z79.82 LONG TERM (CURRENT) USE OF ASPIRIN: ICD-10-CM

## 2018-03-16 DIAGNOSIS — R07.9 CHEST PAIN, UNSPECIFIED: ICD-10-CM

## 2018-03-16 DIAGNOSIS — M54.6 PAIN IN THORACIC SPINE: ICD-10-CM

## 2018-03-16 DIAGNOSIS — G89.29 OTHER CHRONIC PAIN: ICD-10-CM

## 2018-03-16 DIAGNOSIS — D25.9 LEIOMYOMA OF UTERUS, UNSPECIFIED: ICD-10-CM

## 2018-03-16 DIAGNOSIS — I10 ESSENTIAL (PRIMARY) HYPERTENSION: ICD-10-CM

## 2018-03-16 LAB
CULTURE RESULTS: SIGNIFICANT CHANGE UP
SPECIMEN SOURCE: SIGNIFICANT CHANGE UP

## 2018-04-03 NOTE — ED ADULT NURSE NOTE - PSH
back
H/O cardiac pacemaker  [employer] placed 12/10  S/P Colonoscopic Polypectomy  15 years ago per pt  S/P TKR (total knee replacement)  Right TKR in 5/16

## 2018-04-16 ENCOUNTER — EMERGENCY (EMERGENCY)
Facility: HOSPITAL | Age: 83
LOS: 0 days | Discharge: HOME | End: 2018-04-16
Attending: EMERGENCY MEDICINE
Payer: MEDICARE

## 2018-04-16 VITALS
WEIGHT: 100.09 LBS | OXYGEN SATURATION: 98 % | HEART RATE: 75 BPM | HEIGHT: 65 IN | DIASTOLIC BLOOD PRESSURE: 96 MMHG | RESPIRATION RATE: 16 BRPM | SYSTOLIC BLOOD PRESSURE: 141 MMHG

## 2018-04-16 VITALS
HEART RATE: 68 BPM | SYSTOLIC BLOOD PRESSURE: 101 MMHG | OXYGEN SATURATION: 99 % | DIASTOLIC BLOOD PRESSURE: 61 MMHG | RESPIRATION RATE: 18 BRPM

## 2018-04-16 DIAGNOSIS — Z95.0 PRESENCE OF CARDIAC PACEMAKER: Chronic | ICD-10-CM
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
## 2018-04-16 DIAGNOSIS — Z96.659 PRESENCE OF UNSPECIFIED ARTIFICIAL KNEE JOINT: Chronic | ICD-10-CM

## 2018-04-16 DIAGNOSIS — I10 ESSENTIAL (PRIMARY) HYPERTENSION: ICD-10-CM

## 2018-04-16 DIAGNOSIS — M54.5 LOW BACK PAIN: ICD-10-CM

## 2018-04-16 DIAGNOSIS — M15.9 POLYOSTEOARTHRITIS, UNSPECIFIED: ICD-10-CM

## 2018-04-16 DIAGNOSIS — S39.012A STRAIN OF MUSCLE, FASCIA AND TENDON OF LOWER BACK, INITIAL ENCOUNTER: ICD-10-CM

## 2018-04-16 DIAGNOSIS — Z79.1 LONG TERM (CURRENT) USE OF NON-STEROIDAL ANTI-INFLAMMATORIES (NSAID): ICD-10-CM

## 2018-04-16 DIAGNOSIS — E78.00 PURE HYPERCHOLESTEROLEMIA, UNSPECIFIED: ICD-10-CM

## 2018-04-16 DIAGNOSIS — I48.91 UNSPECIFIED ATRIAL FIBRILLATION: ICD-10-CM

## 2018-04-16 DIAGNOSIS — E03.9 HYPOTHYROIDISM, UNSPECIFIED: ICD-10-CM
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
## 2018-04-16 LAB
ALBUMIN SERPL ELPH-MCNC: 3.5 G/DL — SIGNIFICANT CHANGE UP (ref 3.3–5)
ALP SERPL-CCNC: 56 U/L — SIGNIFICANT CHANGE UP (ref 40–120)
ALT FLD-CCNC: 8 U/L — LOW (ref 12–78)
ANION GAP SERPL CALC-SCNC: 7 MMOL/L — SIGNIFICANT CHANGE UP (ref 5–17)
APTT BLD: 30.1 SEC — SIGNIFICANT CHANGE UP (ref 27.5–37.4)
AST SERPL-CCNC: 11 U/L — LOW (ref 15–37)
BASOPHILS # BLD AUTO: 0 K/UL — SIGNIFICANT CHANGE UP (ref 0–0.2)
BASOPHILS NFR BLD AUTO: 0 % — SIGNIFICANT CHANGE UP (ref 0–2)
BILIRUB SERPL-MCNC: 1.4 MG/DL — HIGH (ref 0.2–1.2)
BUN SERPL-MCNC: 24 MG/DL — HIGH (ref 7–23)
CALCIUM SERPL-MCNC: 8.6 MG/DL — SIGNIFICANT CHANGE UP (ref 8.5–10.1)
CHLORIDE SERPL-SCNC: 111 MMOL/L — HIGH (ref 96–108)
CO2 SERPL-SCNC: 27 MMOL/L — SIGNIFICANT CHANGE UP (ref 22–31)
CREAT SERPL-MCNC: 0.56 MG/DL — SIGNIFICANT CHANGE UP (ref 0.5–1.3)
EOSINOPHIL # BLD AUTO: 0 K/UL — SIGNIFICANT CHANGE UP (ref 0–0.5)
EOSINOPHIL NFR BLD AUTO: 0 % — SIGNIFICANT CHANGE UP (ref 0–6)
GIANT PLATELETS BLD QL SMEAR: PRESENT — SIGNIFICANT CHANGE UP
GLUCOSE SERPL-MCNC: 72 MG/DL — SIGNIFICANT CHANGE UP (ref 70–99)
HCT VFR BLD CALC: 38.5 % — SIGNIFICANT CHANGE UP (ref 34.5–45)
HGB BLD-MCNC: 12.6 G/DL — SIGNIFICANT CHANGE UP (ref 11.5–15.5)
INR BLD: 1.19 RATIO — HIGH (ref 0.88–1.16)
LYMPHOCYTES # BLD AUTO: 0.16 K/UL — LOW (ref 1–3.3)
LYMPHOCYTES # BLD AUTO: 4 % — LOW (ref 13–44)
MANUAL SMEAR VERIFICATION: SIGNIFICANT CHANGE UP
MCHC RBC-ENTMCNC: 32.7 GM/DL — SIGNIFICANT CHANGE UP (ref 32–36)
MCHC RBC-ENTMCNC: 33.2 PG — SIGNIFICANT CHANGE UP (ref 27–34)
MCV RBC AUTO: 101.3 FL — HIGH (ref 80–100)
MONOCYTES # BLD AUTO: 0.16 K/UL — SIGNIFICANT CHANGE UP (ref 0–0.9)
MONOCYTES NFR BLD AUTO: 4 % — SIGNIFICANT CHANGE UP (ref 2–14)
NEUTROPHILS # BLD AUTO: 3.74 K/UL — SIGNIFICANT CHANGE UP (ref 1.8–7.4)
NEUTROPHILS NFR BLD AUTO: 92 % — HIGH (ref 43–77)
NRBC # BLD: 0 /100 — SIGNIFICANT CHANGE UP (ref 0–0)
NRBC # BLD: SIGNIFICANT CHANGE UP /100 WBCS (ref 0–0)
PLAT MORPH BLD: ABNORMAL
PLATELET # BLD AUTO: 119 K/UL — LOW (ref 150–400)
PLATELET COUNT - ESTIMATE: ABNORMAL
POTASSIUM SERPL-MCNC: 3.7 MMOL/L — SIGNIFICANT CHANGE UP (ref 3.5–5.3)
POTASSIUM SERPL-SCNC: 3.7 MMOL/L — SIGNIFICANT CHANGE UP (ref 3.5–5.3)
PROT SERPL-MCNC: 6.5 GM/DL — SIGNIFICANT CHANGE UP (ref 6–8.3)
PROTHROM AB SERPL-ACNC: 13 SEC — HIGH (ref 9.8–12.7)
RBC # BLD: 3.8 M/UL — SIGNIFICANT CHANGE UP (ref 3.8–5.2)
RBC # FLD: 14.5 % — SIGNIFICANT CHANGE UP (ref 10.3–14.5)
RBC BLD AUTO: NORMAL — SIGNIFICANT CHANGE UP
SODIUM SERPL-SCNC: 145 MMOL/L — SIGNIFICANT CHANGE UP (ref 135–145)
WBC # BLD: 4.07 K/UL — SIGNIFICANT CHANGE UP (ref 3.8–10.5)
WBC # FLD AUTO: 4.07 K/UL — SIGNIFICANT CHANGE UP (ref 3.8–10.5)

## 2018-04-16 PROCEDURE — 99284 EMERGENCY DEPT VISIT MOD MDM: CPT

## 2018-04-16 PROCEDURE — 72100 X-RAY EXAM L-S SPINE 2/3 VWS: CPT | Mod: 26

## 2018-04-16 PROCEDURE — 93010 ELECTROCARDIOGRAM REPORT: CPT

## 2018-04-16 PROCEDURE — 71111 X-RAY EXAM RIBS/CHEST4/> VWS: CPT | Mod: 26

## 2018-04-16 PROCEDURE — 72070 X-RAY EXAM THORAC SPINE 2VWS: CPT | Mod: 26

## 2018-04-16 RX ORDER — OXYCODONE AND ACETAMINOPHEN 5; 325 MG/1; MG/1
1 TABLET ORAL ONCE
Qty: 0 | Refills: 0 | Status: DISCONTINUED | OUTPATIENT
Start: 2018-04-16 | End: 2018-04-16

## 2018-04-16 RX ORDER — IBUPROFEN 200 MG
1 TABLET ORAL
Qty: 20 | Refills: 0
Start: 2018-04-16 | End: 2018-04-20

## 2018-04-16 RX ADMIN — OXYCODONE AND ACETAMINOPHEN 1 TABLET(S): 5; 325 TABLET ORAL at 10:25

## 2018-04-16 NOTE — PHYSICAL THERAPY INITIAL EVALUATION ADULT - CRITERIA FOR SKILLED THERAPEUTIC INTERVENTIONS
risk reduction/prevention/therapy frequency/impairments found/Home c home PT./anticipated discharge recommendation/functional limitations in following categories/predicted duration of therapy intervention

## 2018-04-16 NOTE — ED PROVIDER NOTE - CONSTITUTIONAL, MLM
normal... Well appearing, well nourished, awake, alert, oriented to person, place, time/situation and in no apparent distress. Speaking in clear full sentences, no nasal flaring no shoulders retractions not holding her head or chest or abdomen, appears comfortable lying on her right side in a bright light room.

## 2018-04-16 NOTE — PHYSICAL THERAPY INITIAL EVALUATION ADULT - BALANCE TRAINING, PT EVAL
Pt will increase static/dynamic standing balance to WFL to perform all functional mobility without LOB, by 2weeks

## 2018-04-16 NOTE — PHYSICAL THERAPY INITIAL EVALUATION ADULT - GENERAL OBSERVATIONS, REHAB EVAL
Pt seen supine in bed, alert and Ox4, NAD, Aide at bedside. Pt BP 82/52, HR 62, O2 97% on room air. Pt performed bed mobility and sit to stand c rolling walker independently. Pt ambulates 25ft at bedside c rolling walker and supervision secondary to being connected to IV and monitors. Pt resumed supine position in bed, NAD, call bell in reach.

## 2018-04-16 NOTE — ED PROVIDER NOTE - ENMT, MLM
Airway patent, Nasal mucosa clear. Mouth with normal mucosa. Throat has no vesicles, no oropharyngeal exudates and uvula is midline. No wound or hematoma to the scalp or face

## 2018-04-16 NOTE — ED ADULT NURSE NOTE - OBJECTIVE STATEMENT
biba after falling 3 days ago, fell hurt back, worsens when laying on left side, and when breathing deeply. Denies nausea, vomiting, fever, chills.

## 2018-04-16 NOTE — ED PROVIDER NOTE - EXTREMITY EXAM
Pt is able to raise bilateral legs > 20o on supine position/no deformity, pain or tenderness, no restriction of movement

## 2018-04-16 NOTE — ED PROVIDER NOTE - OBJECTIVE STATEMENT
88 years old female by ems with home aide c/o bilateral lower back pain after right > left side after tripped over her walker 3 to 4 days ago and pain increases this morning. Pt denies trauma to the head, headache, loc, dizziness, neck/hips pain, blurred visions, light sensitivities, focal/distal weakness or numbness, cough, sob, chest pain, nausea, vomiting, fever, chills, abd pain, dysuria or irregular bowel movements.

## 2018-04-16 NOTE — PHYSICAL THERAPY INITIAL EVALUATION ADULT - GAIT DEVIATIONS NOTED, PT EVAL
increased time in double stance/decreased velocity of limb motion/decreased yashira/decreased step length/decreased stride length

## 2018-04-24 ENCOUNTER — EMERGENCY (EMERGENCY)
Facility: HOSPITAL | Age: 83
LOS: 0 days | Discharge: HOME | End: 2018-04-24
Attending: EMERGENCY MEDICINE
Payer: MEDICARE

## 2018-04-24 VITALS
DIASTOLIC BLOOD PRESSURE: 78 MMHG | OXYGEN SATURATION: 96 % | HEIGHT: 64 IN | RESPIRATION RATE: 18 BRPM | SYSTOLIC BLOOD PRESSURE: 102 MMHG | HEART RATE: 77 BPM | TEMPERATURE: 98 F | WEIGHT: 108.03 LBS

## 2018-04-24 VITALS
SYSTOLIC BLOOD PRESSURE: 108 MMHG | DIASTOLIC BLOOD PRESSURE: 59 MMHG | TEMPERATURE: 98 F | HEART RATE: 70 BPM | OXYGEN SATURATION: 97 % | RESPIRATION RATE: 18 BRPM

## 2018-04-24 DIAGNOSIS — Z96.659 PRESENCE OF UNSPECIFIED ARTIFICIAL KNEE JOINT: Chronic | ICD-10-CM

## 2018-04-24 DIAGNOSIS — Z95.0 PRESENCE OF CARDIAC PACEMAKER: Chronic | ICD-10-CM

## 2018-04-24 PROCEDURE — 72100 X-RAY EXAM L-S SPINE 2/3 VWS: CPT | Mod: 26

## 2018-04-24 PROCEDURE — 99283 EMERGENCY DEPT VISIT LOW MDM: CPT | Mod: 25

## 2018-04-24 PROCEDURE — 72070 X-RAY EXAM THORAC SPINE 2VWS: CPT | Mod: 26

## 2018-04-24 RX ORDER — TRAMADOL HYDROCHLORIDE 50 MG/1
1 TABLET ORAL
Qty: 12 | Refills: 0
Start: 2018-04-24 | End: 2018-04-26

## 2018-04-24 RX ORDER — ACETAMINOPHEN 500 MG
2 TABLET ORAL
Qty: 40 | Refills: 0
Start: 2018-04-24 | End: 2018-04-28

## 2018-04-24 RX ORDER — TRAMADOL HYDROCHLORIDE 50 MG/1
50 TABLET ORAL ONCE
Qty: 0 | Refills: 0 | Status: DISCONTINUED | OUTPATIENT
Start: 2018-04-24 | End: 2018-04-24

## 2018-04-24 RX ORDER — ACETAMINOPHEN 500 MG
975 TABLET ORAL ONCE
Qty: 0 | Refills: 0 | Status: COMPLETED | OUTPATIENT
Start: 2018-04-24 | End: 2018-04-24

## 2018-04-24 RX ADMIN — TRAMADOL HYDROCHLORIDE 50 MILLIGRAM(S): 50 TABLET ORAL at 06:41

## 2018-04-24 RX ADMIN — TRAMADOL HYDROCHLORIDE 50 MILLIGRAM(S): 50 TABLET ORAL at 05:20

## 2018-04-24 RX ADMIN — Medication 975 MILLIGRAM(S): at 05:20

## 2018-04-24 NOTE — ED ADULT NURSE NOTE - PSH
H/O cardiac pacemaker  [employer] placed 12/10  S/P Colonoscopic Polypectomy  15 years ago per pt  S/P TKR (total knee replacement)  Right TKR in 5/16

## 2018-04-24 NOTE — ED PROVIDER NOTE - MEDICAL DECISION MAKING DETAILS
chronic compression fractures of lower back - xray lumbosacral/thoracic performed again for check of back pain. chronic compression fractures of lower back - xray lumbosacral/thoracic performed again for check of back pain same compression fracture noted of L3, no new changes noted to dc with PMD follow up.

## 2018-04-24 NOTE — ED ADULT NURSE NOTE - OBJECTIVE STATEMENT
Patient to the ED with back pain, stated that she was just turning in bed and began having back pain described as sharp and intolerable, denies fall or injury.

## 2018-04-24 NOTE — ED PROVIDER NOTE - OBJECTIVE STATEMENT
88 year old female with PMH of afib, chronic back pain, HTN, HLD, Hypothyroid, lumbar compression fractures otherwise has been in ED multiple times for back pain. States otherwise last night felt pain on twisting movement then has had persistent discomfort on mid to low back.

## 2018-04-24 NOTE — ED PROVIDER NOTE - CONSTITUTIONAL, MLM
normal... Well appearing, well nourished, awake, alert, oriented to person, place, time/situation and in mild to moderate distress secondary to pain.

## 2018-04-24 NOTE — ED ADULT NURSE NOTE - CHIEF COMPLAINT QUOTE
Pt A&Ox4 BIBA for fall c/o back pain and left leg pain. EMS states " she told me she fell 3hr ago and called the hospital and the doctor told her to come in. Son said she didn't fall." Pt states conflicting stories.

## 2018-04-24 NOTE — ED PROVIDER NOTE - PSH
H/O cardiac pacemaker  [employer] placed 12/10  S/P Colonoscopic Polypectomy  15 years ago per pt  S/P TKR (total knee replacement)  Right TKR in 5/16

## 2018-04-24 NOTE — ED PROVIDER NOTE - PMH
The following labs have been reviewed:     Â· P2Y12: 164    Sheryl has been made aware of these results and the following recommendations:     Â· Continue clopidogrel (Plavix) 75mg BID (no change)  Â· Recheck labs in June with a follow-up appointment with Dr. Vanessa Mina (originally due in September 2017). The telephone number for the  was provided for Sheryl to call at her convenience to schedule. Reviewed [facility] education with the patient:   BALANCE: Sudden loss of balance or coordination  EYES: Sudden change in vision; loss of vision; blurry vision; or double vision  FACIAL DROOP: Any facial droop on one side of the face  ARMS: Arm or leg weakness or numbness  SPEECH: Slurred speech, trouble speaking, trouble understanding speech  TERRIBLE HEADACHE: Sudden onset of a terrible headache  Â· The patient was instructed to call 911 with any of the above symptoms so they can be brought to the nearest stroke center for evaluation and treatment. Sheryl verbalized an understanding of all and appreciation for the call.      CC:  [facility] Atrial fibrillation    Chronic Back Pain    Generalized Osteoarthritis    Herpes zoster without complication  2015  HTN (hypertension)    Hypercholesteremia    Hypothyroidism    Lumbar Vertebral Fracture

## 2018-04-24 NOTE — ED ADULT TRIAGE NOTE - CHIEF COMPLAINT QUOTE
Pt BIBA for fall c/o back pain and left leg pain. EMS states " she told me she fell 3hr ago and called the hospital and the doctor told her to come in. Son said she didn't fall." Pt states conflicting stories. Pt A&Ox4 BIBA for fall c/o back pain and left leg pain. EMS states " she told me she fell 3hr ago and called the hospital and the doctor told her to come in. Son said she didn't fall." Pt states conflicting stories.

## 2018-04-25 DIAGNOSIS — M54.5 LOW BACK PAIN: ICD-10-CM

## 2018-04-25 DIAGNOSIS — Z95.0 PRESENCE OF CARDIAC PACEMAKER: ICD-10-CM

## 2018-04-25 DIAGNOSIS — M15.9 POLYOSTEOARTHRITIS, UNSPECIFIED: ICD-10-CM

## 2018-04-25 DIAGNOSIS — I48.91 UNSPECIFIED ATRIAL FIBRILLATION: ICD-10-CM

## 2018-04-25 DIAGNOSIS — E78.00 PURE HYPERCHOLESTEROLEMIA, UNSPECIFIED: ICD-10-CM

## 2018-04-25 DIAGNOSIS — Z79.82 LONG TERM (CURRENT) USE OF ASPIRIN: ICD-10-CM

## 2018-04-25 DIAGNOSIS — E03.9 HYPOTHYROIDISM, UNSPECIFIED: ICD-10-CM
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
## 2018-04-25 DIAGNOSIS — G89.29 OTHER CHRONIC PAIN: ICD-10-CM

## 2018-04-25 DIAGNOSIS — B02.9 ZOSTER WITHOUT COMPLICATIONS: ICD-10-CM
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
## 2018-04-25 DIAGNOSIS — Z79.1 LONG TERM (CURRENT) USE OF NON-STEROIDAL ANTI-INFLAMMATORIES (NSAID): ICD-10-CM

## 2018-04-25 DIAGNOSIS — I10 ESSENTIAL (PRIMARY) HYPERTENSION: ICD-10-CM

## 2018-04-25 DIAGNOSIS — Z96.651 PRESENCE OF RIGHT ARTIFICIAL KNEE JOINT: ICD-10-CM

## 2018-05-01 ENCOUNTER — LABORATORY RESULT (OUTPATIENT)
Age: 83
End: 2018-05-01

## 2018-05-01 ENCOUNTER — APPOINTMENT (OUTPATIENT)
Dept: ELECTROPHYSIOLOGY | Facility: CLINIC | Age: 83
End: 2018-05-01
Payer: MEDICARE

## 2018-05-01 ENCOUNTER — NON-APPOINTMENT (OUTPATIENT)
Age: 83
End: 2018-05-01

## 2018-05-01 VITALS
HEART RATE: 38 BPM | SYSTOLIC BLOOD PRESSURE: 109 MMHG | BODY MASS INDEX: 17.66 KG/M2 | HEIGHT: 64.96 IN | DIASTOLIC BLOOD PRESSURE: 70 MMHG | WEIGHT: 106 LBS | OXYGEN SATURATION: 97 %

## 2018-05-01 DIAGNOSIS — I49.3 VENTRICULAR PREMATURE DEPOLARIZATION: ICD-10-CM

## 2018-05-01 DIAGNOSIS — Z79.01 LONG TERM (CURRENT) USE OF ANTICOAGULANTS: ICD-10-CM

## 2018-05-01 PROCEDURE — 93000 ELECTROCARDIOGRAM COMPLETE: CPT | Mod: 59

## 2018-05-01 PROCEDURE — 99214 OFFICE O/P EST MOD 30 MIN: CPT

## 2018-05-01 PROCEDURE — 93283 PRGRMG EVAL IMPLANTABLE DFB: CPT

## 2018-05-01 RX ORDER — ESOMEPRAZOLE MAGNESIUM 40 MG/1
40 CAPSULE, DELAYED RELEASE ORAL
Qty: 90 | Refills: 0 | Status: DISCONTINUED | COMMUNITY
Start: 2017-06-01 | End: 2018-05-01

## 2018-05-01 RX ORDER — BACLOFEN 10 MG/1
10 TABLET ORAL
Qty: 90 | Refills: 0 | Status: DISCONTINUED | COMMUNITY
Start: 2017-11-08 | End: 2018-05-01

## 2018-05-01 RX ORDER — APIXABAN 5 MG/1
5 TABLET, FILM COATED ORAL
Qty: 60 | Refills: 6 | Status: DISCONTINUED | COMMUNITY
Start: 2017-12-01 | End: 2018-05-01

## 2018-05-01 RX ORDER — APIXABAN 2.5 MG/1
2.5 TABLET, FILM COATED ORAL
Refills: 0 | Status: DISCONTINUED | COMMUNITY
End: 2018-05-01

## 2018-05-05 LAB
ALBUMIN SERPL ELPH-MCNC: 3.9 G/DL
ALP BLD-CCNC: 56 U/L
ALT SERPL-CCNC: 8 U/L
ANION GAP SERPL CALC-SCNC: 14 MMOL/L
AST SERPL-CCNC: 17 U/L
BASOPHILS # BLD AUTO: 0.01 K/UL
BASOPHILS NFR BLD AUTO: 0.2 %
BILIRUB SERPL-MCNC: 1.1 MG/DL
BUN SERPL-MCNC: 17 MG/DL
CALCIUM SERPL-MCNC: 9.3 MG/DL
CHLORIDE SERPL-SCNC: 104 MMOL/L
CO2 SERPL-SCNC: 25 MMOL/L
CREAT SERPL-MCNC: 0.61 MG/DL
EOSINOPHIL # BLD AUTO: 0.01 K/UL
EOSINOPHIL NFR BLD AUTO: 0.2 %
GLUCOSE SERPL-MCNC: 65 MG/DL
HCT VFR BLD CALC: 41.6 %
HGB BLD-MCNC: 13.5 G/DL
IMM GRANULOCYTES NFR BLD AUTO: 0.2 %
LYMPHOCYTES # BLD AUTO: 0.94 K/UL
LYMPHOCYTES NFR BLD AUTO: 17.9 %
MAGNESIUM SERPL-MCNC: 1.9 MG/DL
MAN DIFF?: NORMAL
MCHC RBC-ENTMCNC: 32.5 GM/DL
MCHC RBC-ENTMCNC: 33.4 PG
MCV RBC AUTO: 103 FL
MONOCYTES # BLD AUTO: 0.33 K/UL
MONOCYTES NFR BLD AUTO: 6.3 %
NEUTROPHILS # BLD AUTO: 3.95 K/UL
NEUTROPHILS NFR BLD AUTO: 75.2 %
PLATELET # BLD AUTO: 172 K/UL
POTASSIUM SERPL-SCNC: 4 MMOL/L
PROT SERPL-MCNC: 6.8 G/DL
RBC # BLD: 4.04 M/UL
RBC # FLD: 14.7 %
SODIUM SERPL-SCNC: 143 MMOL/L
T3RU NFR SERPL: 0.91 INDEX
T4 SERPL-MCNC: 8.9 UG/DL
TSH SERPL-ACNC: 11.95 UIU/ML
WBC # FLD AUTO: 5.25 K/UL

## 2018-07-14 NOTE — ED PROVIDER NOTE - NEURO NEGATIVE STATEMENT, MLM
Subjective


Date of Service: 18


Interval History: 





Patient is able to state only that she has no pain. A/Ox1 but does not answer 

questions, only stares when asked a question. Follows basic commands.  Follows 

people around the room with her eyes. Screams out occasionally in apparent pain 

with no obvious provocation. 


Family History: Unchanged from Admission


Social History: Unchanged from Admission


Past Medical History: Unchanged from Admission





Objective


Active Medications: 








Acetaminophen (Tylenol Tab*)  650 mg PO Q4H PRN


   PRN Reason: FEVER/PAIN


   Last Admin: 18 20:46 Dose:  650 mg


Al Hydrox/Mg Hydrox/Simethicone (Maalox Plus*)  30 ml PO Q6H PRN


   PRN Reason: INDIGESTION


Amlodipine Besylate (Norvasc Tab*)  10 mg PO DAILY [facility]


   Last Admin: 18 09:46 Dose:  10 mg


Cyclobenzaprine HCl (Flexeril Tab*)  5 mg PO TID PRN


   PRN Reason: SPASMS


   Last Admin: 18 09:35 Dose:  5 mg


Dextrose (D50w Syringe 50 Ml*)  12.5 gm IV PUSH .FOR FS < 60 - SS PRN


   PRN Reason: FS < 60


Diazepam (Valium Inj (Nf))  2 mg IV Q8H PRN


   PRN Reason: Spasm


Docusate Sodium (Colace Cap*)  100 mg PO BID [facility]


   Last Admin: 18 09:46 Dose:  Not Given


Hydromorphone HCl (Dilaudid Inj*)  1 mg IV Q3H PRN


   PRN Reason: PAIN


   Last Admin: 18 03:22 Dose:  1 mg


Sodium Chloride (Ns 0.9% 1000 Ml*)  1,000 mls @ 125 mls/hr IV PER RATE [facility]


   Last Admin: 18 04:22 Dose:  125 mls/hr


Insulin Human Lispro (Humalog*)  0 units SUBCUT 0000,0600,1200,1800 [facility]; 

Protocol


   Last Admin: 18 05:55 Dose:  Not Given


Ketorolac Tromethamine (Toradol Inj*)  15 mg IV PUSH Q6H PRN


   PRN Reason: PAIN


   Last Admin: 18 11:40 Dose:  15 mg


Magnesium Hydroxide (Milk Of Magnesia Liq*)  30 ml PO Q4H PRN


   PRN Reason: CONSTIPATION


Ondansetron HCl (Zofran Inj*)  4 mg IV Q4H PRN


   PRN Reason: NAUSEA/VOMITING


Oxycodone/Acetaminophen (Percocet 5/325 Tab*)  1 tab PO Q4H PRN


   PRN Reason: Pain


   Last Admin: 18 04:22 Dose:  1 tab








 Vital Signs - 8 hr











  18





  03:22 03:46 04:22


 


Temperature  98.8 F 


 


Pulse Rate  78 


 


Respiratory 18 20 20





Rate   


 


Blood Pressure  143/41 





(mmHg)   


 


O2 Sat by Pulse  98 





Oximetry   














  18





  05:18 06:37 07:31


 


Temperature   97.7 F


 


Pulse Rate   60


 


Respiratory 18 18 16





Rate   


 


Blood Pressure   123/38





(mmHg)   


 


O2 Sat by Pulse   97





Oximetry   














  18





  09:35


 


Temperature 


 


Pulse Rate 


 


Respiratory 14





Rate 


 


Blood Pressure 





(mmHg) 


 


O2 Sat by Pulse 





Oximetry 











Oxygen Devices in Use Now: Nasal Cannula


Appearance: Patient is a 77yo female who appears stated age and is sitting in 

the bed in mild distress from apparent pain.


Eyes: No Scleral Icterus, PERRLA


Ears/Nose/Mouth/Throat: NL Teeth, Lips, Gums, Clear Oropharnyx, Mucous 

Membranes Moist


Neck: NL Appearance and Movements; NL JVP, Trachea Midline


Respiratory: Symmetrical Chest Expansion and Respiratory Effort, Clear to 

Auscultation


Cardiovascular: NL Sounds; No Murmurs; No JVD, RRR, No Edema


Abdominal: NL Sounds; No Tenderness; No Distention, No Hepatosplenomegaly


Lymphatic: No Cervical Adenopathy, - - Stable large, diffuse adenopathy. 


Extremities: No Edema, No Clubbing, Cyanosis, - - Right leg flexed at the knee 

and abducted. No bovious deformity or hematoma. 


Skin: No Rash or Ulcers, No Nodules or Sclerosis


Neurological: - - CN II-XII intact.


Result Diagrams: 


 18 05:28





 18 05:28


Microbiology and Other Data: 


 Microbiology











 18 15:40 Nasal Screen MRSA (PCR) - Final





 Nasal    Mrsa Not Detected











Diagnostic Imaging: 





Patient Name:         ROXANA KNIGHT                                        

                        Medical Record#: [id number]


Ordering Physician: Juana Pfeiffer MD                                            

                        Acct.#: [id number]


:     1940         Age: 78   Sex: F                                   

                        Location: SURGICAL STAY UNIT


Exam Date: 18                                                       

                        ADM Status: ADM IN


Order Information:                         CT PELVIS W/O


Accession Number:                          [id number]


CPT:                                       [id number]


INDICATION: Traumatic fracture right hip.





COMPARISON:  Comparison is made with a prior x-ray study of the right femur and 

pelvis


from 2018.





TECHNIQUE: Contiguous axial sections were obtained through the pelvis without 

intravenous


or oral contrast. Images were reconstructed in the coronal and sagittal planes.





FINDINGS:  The bones appear osteoporotic. There is a subcapital right femoral 

neck


fracture. The fracture fragments are overriding. There is anterior displacement 

of the


distal fragment relative the proximal fragment and rotation of the femur. There 

is varus


angulation. There is nonspecific sclerotic change in the ischial tuberosity.  

There is


mild to moderate bilateral osteoarthritic change in the hips.





There is marked enlargement of the left psoas and iliacus muscles consistent 

with a mass,


bulky adenopathy or hematoma.





The visualized portion of the small bowel and colon appear nondistended. There 

is a


catheter within the urinary bladder.





No free intraperitoneal air or fluid is seen.





IMPRESSION:  


1. DISPLACED ANGULATED RIGHT SUBCAPITAL FEMORAL NECK FRACTURE.


2. SCLEROTIC LESION WITHIN THE RIGHT ISCHIAL TUBEROSITY.


3. ENLARGEMENT OF THE LEFT PSOAS AND ILIACUS MUSCLES MOST CONSISTENT WITH A MASS

, BULKY


ADENOPATHY OR HEMATOMA. RECOMMEND A CT OF THE CHEST, ABDOMEN AND PELVIS WITH 

CONTRAST FOR


FURTHER EVALUATION.





____________________________________________________________


<Electronically signed by Corey Apodaca MD in OV>  18


Dictated By: Corey Apodaca MD


Dictated Date/Time: 18


Transcribed Date/Time: 18


Copy to:





CTA HEAD and NECK Conclusion:


[facility] IMAGING


Patient Name:ROXANA KNIGHT                                          MR:

[id number]                                  : 1940














LYMPH NODES: There is biapical emphysematous change.





BONES AND SOFT TISSUES: No bone or soft tissue abnormalities are noted.








CTA HEAD:





INTRACRANIAL CIRCULATION: There is no aneurysm, vascular malformation, occlusion

, or


stenosis of the visualized intracranial circulation. The anterior communicating 

artery


complex is clear. Bilateral posterior communicating arteries are identified.





VENOUS CIRCULATION: The venous system is unremarkable.





PERFUSION: There is no obvious parenchymal perfusion deficit.





HEMORRHAGE/INFARCT: There is no hemorrhage or acute infarct.


MASSES/SHIFT: There is no mass or shift.


EXTRA-AXIAL SPACES: There are no extra-axial fluid collections.


SULCI AND VENTRICLES: The sulci and ventricles are normal in size and position 

for the


patient's stated age.





CEREBRUM: There is hypoattenuation of the periventricular and subcortical white 

matter. 


BRAINSTEM: There are no focal parenchymal abnormalities.


CEREBELLUM: There are no focal parenchymal abnormalities.





PARANASAL SINUSES: The paranasal sinuses are clear.


ORBITS: The orbits are unremarkable.


BONES AND SOFT TISSUE: Degenerative changes are noted of the spine. There are 

multiple


lobulated masses of the right shoulder musculature, and evaluated on the current


examination.





OTHER: There is no abnormal enhancement.








IMPRESSION: 


1.  NO INTERNAL CAROTID ARTERY STENOSIS BY NASCET CRITERIA.


2.  NO ANEURYSM, VASCULAR MALFORMATION, OCCLUSION, OR STENOSIS OF THE VISUALIZED


INTRACRANIAL CIRCULATION..


3.  ATHEROSCLEROSIS.


4.  PROXIMAL VESSEL ISCHEMIC CHANGE.


5.  NO ACUTE INTRACRANIAL PATHOLOGY.


6.  MULTIPLE LOBULATED MASSES OF THE MUSCULATURE OF THE RIGHT SHOULDER GIRDLE, 

EVALUATED


ON THE CURRENT EXAMINATION SUSPICIOUS FOR SARCOMA VERSUS METASTATIC DISEASE.





MRI BRAIN


Patient Name:         ROXANA KNIGHT                                        

                        Medical Record#: [id number]


Ordering Physician: Demetra Abdullahi NP                                   

                        Acct.#: [id number]


:     1940         Age: 78   Sex: F                                   

                        Location: SURGICAL STAY UNIT


Exam Date: 18 1507                                                       

                        ADM Status: ADM IN


Order Information:                         MRI BRAIN W/O


Accession Number:                          [id number]


CPT:                                       [id number]


Indication: Worsening confusion and weakness. Previous CVAs with residual RIGHT-

sided


weakness.





Comparison: CT and CTA head neck exams of the same date.





Technique: Exakisa 1.5 Hallie MK289N with GEM suite.  MRI brain without 

contrast. 





Report: Diffusion series is negative for acute or subacute ischemia. 

Susceptibility series


is negative for stigmata of hemosiderin deposition to indicate previous 

hemorrhage.





Mild prominence of the cerebral sulci and cerebellar fissures collecting 

atrophy.


Proportional ventricular enlargement. Patent basal cisterns. Disproportionate 

mild volume


loss and T2 hyperintense ischemic gliosis at the LEFT middle cerebral artery 

distribution


of the frontal and parietal lobes consistent with sequela of previous infarct. 

Additional


few nonspecific T2 hyperintense foci within the cerebral white matter without 

mass effect.


No extra-axial fluid collection evident. Preserved major intracranial flow-

voids.


Unremarkable orbital contents.





No suspicious calvarial or skull base lesions evident. Mucous retention cyst or 

polyp at


the inferior LEFT maxillary sinus. Unremarkable scalp. 





IMPRESSION: 


#. Negative for stigmata of acute or subacute ischemia. 


#. Relative mild encephalomalacia related to old LEFT MCA distribution infarct 

involving


the posterior LEFT frontal lobe and parietal lobe. 


#. Mild diffuse atrophy and stigmata of chronic small vessel ischemic disease.





____________________________________________________________


<Electronically signed by Cody Thrasher MD in OV>  18 1632


Dictated By: Cody Thrasher MD


Dictated Date/Time: 18 1632


Transcribed Date/Time: 18 1623


Copy to:





CARDIAC ECHO





Conclusions


There is normal left ventricular systolic function.


The estimated ejection fraction is 50-55%. 


Global left ventricular wall motion and contractility are within normal


limits.


Mild concentric left ventricular hypertrophy is observed.


There is an E to A reversal in the mitral valve flow pattern suggestive


of diastolic dysfunction.


There is mild to moderate aortic regurgitation. 


There is mild to moderate mitral regurgitation. 


There is mild to moderate tricuspid regurgitation.


There is evidence of mild to moderate pulmonary hypertension.


There is no prior echocardiogram available to compare with at this time.








Assess/Plan/Problems-Billing


Assessment: 





This is a 78 year old female patient transferred from a nursing home for femur 

fracture 2/2 unwitnessed fall. PMHx sig for COPD, previous CVAs, DM and HTN 

that presents with a right sided displaced subcapital femoral neck fracture and 

multiple masses concerning for malignancy. 





- Patient Problems


(1) Neoplasm


Current Visit: Yes   Status: Acute   Code(s): D49.9 - NEOPLASM OF UNSPECIFIED 

BEHAVIOR OF UNSPECIFIED SITE   SNOMED Code(s): 15833337


   Comment: 


CTA of the neck visualized local mass in the muscularture of the right shouder


Given the mass in the psoas, this is highly suspicious for metastatic disease/

sarcoma? 


CT chest, abdomen and pelvis with contrast shows numerous masses. 


Appreciate oncology input. 


FNA of left axillary mass pending. 


Will likely have no curative treatment options if metastatic cancer confirmed. 

   





(2) COPD (chronic obstructive pulmonary disease)


Current Visit: Yes   Status: Acute   Code(s): J44.9 - CHRONIC OBSTRUCTIVE 

PULMONARY DISEASE, UNSPECIFIED   SNOMED Code(s): 87493686


   Comment: 


CXR with COPD changes


Allergy to albuterol per record


Continue supportive O2


No signs of exacerbation.    





(3) Femoral neck fracture


Current Visit: Yes   Status: Acute   Code(s): S72.009A - FRACTURE OF UNSP PART 

OF NECK OF UNSP FEMUR, INIT   SNOMED Code(s): 0930094


   Comment: 


Unwitnessed fall, right side fracture


CT pelvis is showing psoas mass on the left/contralateral side to the fracture 

and a sclerotic lesion of the ischial tuberosity


Concern for pathologic fracture


Patient is NOT medically optimized for surgery. 


Discussed with daughter and she is not insistant on going forward with surgery


Will need to be transferred if surgery indicated. 


Does not appear to be main source of patient discomfort.    





(4) History of CVA (cerebrovascular accident)


Current Visit: Yes   Status: Acute   Code(s): Z86.73 - PRSNL HX OF TIA (TIA), 

AND CEREB INFRC W/O RESID DEFICITS   SNOMED Code(s): 719763695


   Comment: 


Per patient's daughter she is sometimes forgetful, however, she can usually 

converse and is appropriate


Unclear cause of current AMS. No Mass or CVA on MRI. 


   





(5) Hypertension


Current Visit: Yes   Status: Acute   Code(s): I10 - ESSENTIAL (PRIMARY) 

HYPERTENSION   SNOMED Code(s): 32739283


   Comment: 


BP stable   





(6) Full code status


Current Visit: Yes   Status: Acute   Code(s): Z78.9 - OTHER SPECIFIED HEALTH 

STATUS   SNOMED Code(s): 606885696


   


Status and Disposition: 





Remain inpatient. no loss of consciousness, no gait abnormality, no headache, no sensory deficits, and no weakness.

## 2018-07-30 ENCOUNTER — EMERGENCY (EMERGENCY)
Facility: HOSPITAL | Age: 83
LOS: 0 days | Discharge: HOME | End: 2018-07-30
Attending: EMERGENCY MEDICINE
Payer: MEDICARE

## 2018-07-30 VITALS
WEIGHT: 110.01 LBS | DIASTOLIC BLOOD PRESSURE: 75 MMHG | SYSTOLIC BLOOD PRESSURE: 117 MMHG | HEIGHT: 63 IN | RESPIRATION RATE: 16 BRPM | HEART RATE: 85 BPM | TEMPERATURE: 99 F | OXYGEN SATURATION: 97 %

## 2018-07-30 VITALS
OXYGEN SATURATION: 98 % | SYSTOLIC BLOOD PRESSURE: 127 MMHG | DIASTOLIC BLOOD PRESSURE: 77 MMHG | TEMPERATURE: 98 F | RESPIRATION RATE: 18 BRPM | HEART RATE: 49 BPM

## 2018-07-30 DIAGNOSIS — Z95.0 PRESENCE OF CARDIAC PACEMAKER: Chronic | ICD-10-CM

## 2018-07-30 DIAGNOSIS — Z96.659 PRESENCE OF UNSPECIFIED ARTIFICIAL KNEE JOINT: Chronic | ICD-10-CM

## 2018-07-30 DIAGNOSIS — I48.91 UNSPECIFIED ATRIAL FIBRILLATION: ICD-10-CM

## 2018-07-30 DIAGNOSIS — E78.5 HYPERLIPIDEMIA, UNSPECIFIED: ICD-10-CM

## 2018-07-30 DIAGNOSIS — I10 ESSENTIAL (PRIMARY) HYPERTENSION: ICD-10-CM

## 2018-07-30 DIAGNOSIS — R42 DIZZINESS AND GIDDINESS: ICD-10-CM

## 2018-07-30 DIAGNOSIS — E03.9 HYPOTHYROIDISM, UNSPECIFIED: ICD-10-CM

## 2018-07-30 DIAGNOSIS — Z88.8 ALLERGY STATUS TO OTHER DRUGS, MEDICAMENTS AND BIOLOGICAL SUBSTANCES STATUS: ICD-10-CM

## 2018-07-30 DIAGNOSIS — R53.1 WEAKNESS: ICD-10-CM

## 2018-07-30 LAB
ALBUMIN SERPL ELPH-MCNC: 3.3 G/DL — SIGNIFICANT CHANGE UP (ref 3.3–5)
ALP SERPL-CCNC: 48 U/L — SIGNIFICANT CHANGE UP (ref 40–120)
ALT FLD-CCNC: 14 U/L — SIGNIFICANT CHANGE UP (ref 12–78)
ANION GAP SERPL CALC-SCNC: 9 MMOL/L — SIGNIFICANT CHANGE UP (ref 5–17)
APPEARANCE UR: CLEAR — SIGNIFICANT CHANGE UP
APTT BLD: 30.7 SEC — SIGNIFICANT CHANGE UP (ref 27.5–37.4)
AST SERPL-CCNC: 16 U/L — SIGNIFICANT CHANGE UP (ref 15–37)
BACTERIA # UR AUTO: ABNORMAL
BASOPHILS # BLD AUTO: 0.02 K/UL — SIGNIFICANT CHANGE UP (ref 0–0.2)
BASOPHILS NFR BLD AUTO: 0.5 % — SIGNIFICANT CHANGE UP (ref 0–2)
BILIRUB SERPL-MCNC: 2.7 MG/DL — HIGH (ref 0.2–1.2)
BILIRUB UR-MCNC: NEGATIVE — SIGNIFICANT CHANGE UP
BUN SERPL-MCNC: 13 MG/DL — SIGNIFICANT CHANGE UP (ref 7–23)
CALCIUM SERPL-MCNC: 8.8 MG/DL — SIGNIFICANT CHANGE UP (ref 8.5–10.1)
CHLORIDE SERPL-SCNC: 110 MMOL/L — HIGH (ref 96–108)
CK MB BLD-MCNC: 2.2 % — SIGNIFICANT CHANGE UP (ref 0–3.5)
CK MB BLD-MCNC: 2.4 % — SIGNIFICANT CHANGE UP (ref 0–3.5)
CK MB CFR SERPL CALC: 1 NG/ML — SIGNIFICANT CHANGE UP (ref 0.5–3.6)
CK MB CFR SERPL CALC: 1.2 NG/ML — SIGNIFICANT CHANGE UP (ref 0.5–3.6)
CK SERPL-CCNC: 46 U/L — SIGNIFICANT CHANGE UP (ref 26–192)
CK SERPL-CCNC: 50 U/L — SIGNIFICANT CHANGE UP (ref 26–192)
CO2 SERPL-SCNC: 24 MMOL/L — SIGNIFICANT CHANGE UP (ref 22–31)
COLOR SPEC: YELLOW — SIGNIFICANT CHANGE UP
CREAT SERPL-MCNC: 0.65 MG/DL — SIGNIFICANT CHANGE UP (ref 0.5–1.3)
DIFF PNL FLD: NEGATIVE — SIGNIFICANT CHANGE UP
EOSINOPHIL # BLD AUTO: 0.02 K/UL — SIGNIFICANT CHANGE UP (ref 0–0.5)
EOSINOPHIL NFR BLD AUTO: 0.5 % — SIGNIFICANT CHANGE UP (ref 0–6)
EPI CELLS # UR: SIGNIFICANT CHANGE UP
GLUCOSE SERPL-MCNC: 92 MG/DL — SIGNIFICANT CHANGE UP (ref 70–99)
GLUCOSE UR QL: NEGATIVE MG/DL — SIGNIFICANT CHANGE UP
HCT VFR BLD CALC: 40.2 % — SIGNIFICANT CHANGE UP (ref 34.5–45)
HGB BLD-MCNC: 13 G/DL — SIGNIFICANT CHANGE UP (ref 11.5–15.5)
IMM GRANULOCYTES NFR BLD AUTO: 0 % — SIGNIFICANT CHANGE UP (ref 0–1.5)
INR BLD: 1.14 RATIO — SIGNIFICANT CHANGE UP (ref 0.88–1.16)
KETONES UR-MCNC: NEGATIVE — SIGNIFICANT CHANGE UP
LEUKOCYTE ESTERASE UR-ACNC: ABNORMAL
LYMPHOCYTES # BLD AUTO: 0.76 K/UL — LOW (ref 1–3.3)
LYMPHOCYTES # BLD AUTO: 20.7 % — SIGNIFICANT CHANGE UP (ref 13–44)
MCHC RBC-ENTMCNC: 32.3 GM/DL — SIGNIFICANT CHANGE UP (ref 32–36)
MCHC RBC-ENTMCNC: 33 PG — SIGNIFICANT CHANGE UP (ref 27–34)
MCV RBC AUTO: 102 FL — HIGH (ref 80–100)
MONOCYTES # BLD AUTO: 0.19 K/UL — SIGNIFICANT CHANGE UP (ref 0–0.9)
MONOCYTES NFR BLD AUTO: 5.2 % — SIGNIFICANT CHANGE UP (ref 2–14)
NEUTROPHILS # BLD AUTO: 2.69 K/UL — SIGNIFICANT CHANGE UP (ref 1.8–7.4)
NEUTROPHILS NFR BLD AUTO: 73.1 % — SIGNIFICANT CHANGE UP (ref 43–77)
NITRITE UR-MCNC: NEGATIVE — SIGNIFICANT CHANGE UP
NRBC # BLD: 0 /100 WBCS — SIGNIFICANT CHANGE UP (ref 0–0)
PH UR: 6 — SIGNIFICANT CHANGE UP (ref 5–8)
PLATELET # BLD AUTO: 134 K/UL — LOW (ref 150–400)
POTASSIUM SERPL-MCNC: 4 MMOL/L — SIGNIFICANT CHANGE UP (ref 3.5–5.3)
POTASSIUM SERPL-SCNC: 4 MMOL/L — SIGNIFICANT CHANGE UP (ref 3.5–5.3)
PROT SERPL-MCNC: 7 GM/DL — SIGNIFICANT CHANGE UP (ref 6–8.3)
PROT UR-MCNC: 30 MG/DL
PROTHROM AB SERPL-ACNC: 12.5 SEC — SIGNIFICANT CHANGE UP (ref 9.8–12.7)
RBC # BLD: 3.94 M/UL — SIGNIFICANT CHANGE UP (ref 3.8–5.2)
RBC # FLD: 13.2 % — SIGNIFICANT CHANGE UP (ref 10.3–14.5)
RBC CASTS # UR COMP ASSIST: SIGNIFICANT CHANGE UP /HPF (ref 0–4)
SALICYLATES SERPL-MCNC: <1.7 MG/DL (ref 2.8–20)
SODIUM SERPL-SCNC: 143 MMOL/L — SIGNIFICANT CHANGE UP (ref 135–145)
SP GR SPEC: 1.02 — SIGNIFICANT CHANGE UP (ref 1.01–1.02)
TROPONIN I SERPL-MCNC: <.015 NG/ML — SIGNIFICANT CHANGE UP (ref 0.01–0.04)
TROPONIN I SERPL-MCNC: <.015 NG/ML — SIGNIFICANT CHANGE UP (ref 0.01–0.04)
TSH SERPL-MCNC: 5.19 UIU/ML — HIGH (ref 0.36–3.74)
UROBILINOGEN FLD QL: 1 MG/DL
WBC # BLD: 3.68 K/UL — LOW (ref 3.8–10.5)
WBC # FLD AUTO: 3.68 K/UL — LOW (ref 3.8–10.5)
WBC UR QL: ABNORMAL

## 2018-07-30 PROCEDURE — 93010 ELECTROCARDIOGRAM REPORT: CPT

## 2018-07-30 PROCEDURE — 71045 X-RAY EXAM CHEST 1 VIEW: CPT | Mod: 26

## 2018-07-30 PROCEDURE — 99285 EMERGENCY DEPT VISIT HI MDM: CPT | Mod: 25

## 2018-07-30 PROCEDURE — 70450 CT HEAD/BRAIN W/O DYE: CPT | Mod: 26

## 2018-07-30 NOTE — ED PROVIDER NOTE - OBJECTIVE STATEMENT
Pertinent PMH/PSH/FHx/SHx and Review of Systems contained within:    87yo F w PMH of afib, chronic back pain, HTN, HLD, Hypothyroid, lumbar compression fractures Pertinent PMH/PSH/FHx/SHx and Review of Systems contained within:    89yo F w PMH of Afib, chronic back pain, HTN, HL, Hypothyroidism, lumbar compression fractures presents to ED for eval of generalized weakness, dizziness & tinnitus.  Pt states she hit her head about 2mos ago, was evaluated and CT head neg for acute pathology.  Pt then noted pressure sensation at top of her head about 4d ago.  Pt also noted ringing in R ear.  Pt then noted dizzy sensation intermittently for the last 2d.  Denies fall, new head trauma, nausea, vomiting, abd pain, CP, SOB.     No fever/chills, No photophobia/eye pain/changes in vision, No ear pain/sore throat/dysphagia, No chest pain/palpitations, no SOB/cough/wheeze/stridor, No abdominal pain, No neck/back pain, no rash

## 2018-07-30 NOTE — ED PROVIDER NOTE - PHYSICAL EXAMINATION
Gen: Alert, pleasant F in NAD speaking in complete sentences  Head: NC, AT, EOMI, normal lids/conjunctiva, no nystagmus  ENT: bilateral TM WNL, normal hearing, patent oropharynx without erythema/exudate, uvula midline  Neck: supple, no tenderness/meningismus/JVD, Trachea midline, FROM  Pulm: Bilateral clear BS, normal resp effort, no wheeze/stridor/retractions  CV: RRR, no M/R/G, +dist pulses  Abd: soft, NT/ND, +BS, no guarding/rebound tenderness  Mskel: no edema/erythema/cyanosis, FROM in all ext  Skin: no rash  Neuro: AAOx3, no sensory/motor deficits, CN 2-12 intact, NIHSS 0

## 2018-07-30 NOTE — ED ADULT TRIAGE NOTE - CHIEF COMPLAINT QUOTE
Pt is here for weakness that started at 0800 yesterday, family gave the patient aspirin when she was feeling ill. Pt denies any nausea/vomiting or diarrhea. Pt has not been feeling sick, nobody in the home is sick, pt just feels weak. Pt has no pain or other complaints other than weakness. No visual or auditory disturbances noted. Pt states that there is no unilateral weakness.

## 2018-07-31 LAB
CULTURE RESULTS: SIGNIFICANT CHANGE UP
SPECIMEN SOURCE: SIGNIFICANT CHANGE UP

## 2018-08-14 NOTE — ED PROVIDER NOTE - RESPIRATORY, MLM
Please call Puma and let her know all the hormone levels were normal.  We had discussed pelvic ultrasound as the next step, I placed this order.      Her WBC count was a little elevated, we should just repeat this in a month or two to make sure it comes back to normal.  Future order placed.     Sharla Bustillo MD    Breath sounds clear and equal bilaterally.

## 2018-08-23 ENCOUNTER — APPOINTMENT (OUTPATIENT)
Dept: ELECTROPHYSIOLOGY | Facility: CLINIC | Age: 83
End: 2018-08-23
Payer: MEDICARE

## 2018-08-23 DIAGNOSIS — Z00.00 ENCOUNTER FOR GENERAL ADULT MEDICAL EXAMINATION W/OUT ABNORMAL FINDINGS: ICD-10-CM

## 2018-08-23 PROCEDURE — 93296 REM INTERROG EVL PM/IDS: CPT

## 2018-08-23 PROCEDURE — 93295 DEV INTERROG REMOTE 1/2/MLT: CPT

## 2018-09-19 ENCOUNTER — EMERGENCY (EMERGENCY)
Facility: HOSPITAL | Age: 83
LOS: 0 days | Discharge: HOME | End: 2018-09-19
Attending: STUDENT IN AN ORGANIZED HEALTH CARE EDUCATION/TRAINING PROGRAM
Payer: MEDICARE

## 2018-09-19 VITALS
SYSTOLIC BLOOD PRESSURE: 136 MMHG | HEART RATE: 58 BPM | RESPIRATION RATE: 20 BRPM | DIASTOLIC BLOOD PRESSURE: 95 MMHG | OXYGEN SATURATION: 95 %

## 2018-09-19 VITALS
HEART RATE: 86 BPM | SYSTOLIC BLOOD PRESSURE: 153 MMHG | OXYGEN SATURATION: 97 % | TEMPERATURE: 98 F | WEIGHT: 110.01 LBS | DIASTOLIC BLOOD PRESSURE: 88 MMHG | HEIGHT: 65 IN | RESPIRATION RATE: 20 BRPM

## 2018-09-19 DIAGNOSIS — E78.00 PURE HYPERCHOLESTEROLEMIA, UNSPECIFIED: ICD-10-CM

## 2018-09-19 DIAGNOSIS — G89.29 OTHER CHRONIC PAIN: ICD-10-CM

## 2018-09-19 DIAGNOSIS — I48.91 UNSPECIFIED ATRIAL FIBRILLATION: ICD-10-CM

## 2018-09-19 DIAGNOSIS — Z95.0 PRESENCE OF CARDIAC PACEMAKER: ICD-10-CM

## 2018-09-19 DIAGNOSIS — Z96.659 PRESENCE OF UNSPECIFIED ARTIFICIAL KNEE JOINT: Chronic | ICD-10-CM

## 2018-09-19 DIAGNOSIS — Z79.52 LONG TERM (CURRENT) USE OF SYSTEMIC STEROIDS: ICD-10-CM

## 2018-09-19 DIAGNOSIS — Z96.651 PRESENCE OF RIGHT ARTIFICIAL KNEE JOINT: ICD-10-CM

## 2018-09-19 DIAGNOSIS — Z79.82 LONG TERM (CURRENT) USE OF ASPIRIN: ICD-10-CM

## 2018-09-19 DIAGNOSIS — E03.9 HYPOTHYROIDISM, UNSPECIFIED: ICD-10-CM

## 2018-09-19 DIAGNOSIS — M15.9 POLYOSTEOARTHRITIS, UNSPECIFIED: ICD-10-CM

## 2018-09-19 DIAGNOSIS — M54.5 LOW BACK PAIN: ICD-10-CM

## 2018-09-19 DIAGNOSIS — I10 ESSENTIAL (PRIMARY) HYPERTENSION: ICD-10-CM

## 2018-09-19 DIAGNOSIS — Z95.0 PRESENCE OF CARDIAC PACEMAKER: Chronic | ICD-10-CM

## 2018-09-19 DIAGNOSIS — Z79.1 LONG TERM (CURRENT) USE OF NON-STEROIDAL ANTI-INFLAMMATORIES (NSAID): ICD-10-CM

## 2018-09-19 PROCEDURE — 72100 X-RAY EXAM L-S SPINE 2/3 VWS: CPT | Mod: 26

## 2018-09-19 PROCEDURE — 99284 EMERGENCY DEPT VISIT MOD MDM: CPT | Mod: 25

## 2018-09-19 RX ORDER — TRAMADOL HYDROCHLORIDE 50 MG/1
50 TABLET ORAL ONCE
Qty: 0 | Refills: 0 | Status: DISCONTINUED | OUTPATIENT
Start: 2018-09-19 | End: 2018-09-19

## 2018-09-19 RX ORDER — MORPHINE SULFATE 50 MG/1
2 CAPSULE, EXTENDED RELEASE ORAL ONCE
Qty: 0 | Refills: 0 | Status: DISCONTINUED | OUTPATIENT
Start: 2018-09-19 | End: 2018-09-19

## 2018-09-19 RX ADMIN — TRAMADOL HYDROCHLORIDE 50 MILLIGRAM(S): 50 TABLET ORAL at 04:59

## 2018-09-19 RX ADMIN — TRAMADOL HYDROCHLORIDE 50 MILLIGRAM(S): 50 TABLET ORAL at 04:00

## 2018-09-19 RX ADMIN — MORPHINE SULFATE 2 MILLIGRAM(S): 50 CAPSULE, EXTENDED RELEASE ORAL at 04:59

## 2018-09-19 NOTE — ED PROVIDER NOTE - OBJECTIVE STATEMENT
88 year old female presents today c/o acute on chronic back pain today, she denies recent trauma or fall, she did attempt to go to her chiropractor initially to see if the pain could be alleviated however, the pain persisted, she states that his happens almost every three months, she has taken tramadol in the past which has helped her pain (-) radiation (-) urinary or bowel incontinence

## 2018-09-19 NOTE — ED ADULT NURSE NOTE - NSIMPLEMENTINTERV_GEN_ALL_ED
Implemented All Fall with Harm Risk Interventions:  [place] to call system. Call bell, personal items and telephone within reach. Instruct patient to call for assistance. Room bathroom lighting operational. Non-slip footwear when patient is off stretcher. Physically safe environment: no spills, clutter or unnecessary equipment. Stretcher in lowest position, wheels locked, appropriate side rails in place. Provide visual cue, wrist band, yellow gown, etc. Monitor gait and stability. Monitor for mental status changes and reorient to person, place, and time. Review medications for side effects contributing to fall risk. Reinforce activity limits and safety measures with patient and family. Provide visual clues: red socks.

## 2018-09-19 NOTE — ED PROVIDER NOTE - MEDICAL DECISION MAKING DETAILS
pain control pain control, pt presented with acute on chronic back pain, percocet prescribed outpt, pt told to follow up with her pmd dr lane

## 2018-09-19 NOTE — ED ADULT TRIAGE NOTE - CHIEF COMPLAINT QUOTE
BIBA,  pt c/o lower back pain x 4 days, worse tonight.  PMH- chronic back pain.  pt took tramadol at 5pm

## 2018-09-19 NOTE — ED PROVIDER NOTE - PSH
H/O cardiac pacemaker  [employer] placed 12/10  S/P Colonoscopic Polypectomy  15 years ago per pt  S/P TKR (total knee replacement)  Right TKR in 5/16

## 2018-10-01 ENCOUNTER — RESULT REVIEW (OUTPATIENT)
Age: 83
End: 2018-10-01

## 2018-11-09 ENCOUNTER — APPOINTMENT (OUTPATIENT)
Dept: ELECTROPHYSIOLOGY | Facility: CLINIC | Age: 83
End: 2018-11-09
Payer: MEDICARE

## 2018-11-09 VITALS — HEART RATE: 74 BPM | SYSTOLIC BLOOD PRESSURE: 125 MMHG | DIASTOLIC BLOOD PRESSURE: 80 MMHG | RESPIRATION RATE: 14 BRPM

## 2018-11-09 DIAGNOSIS — I47.1 SUPRAVENTRICULAR TACHYCARDIA: ICD-10-CM

## 2018-11-09 PROCEDURE — 93283 PRGRMG EVAL IMPLANTABLE DFB: CPT

## 2019-03-07 ENCOUNTER — APPOINTMENT (OUTPATIENT)
Dept: ELECTROPHYSIOLOGY | Facility: CLINIC | Age: 84
End: 2019-03-07
Payer: MEDICARE

## 2019-03-07 PROCEDURE — 93295 DEV INTERROG REMOTE 1/2/MLT: CPT

## 2019-03-07 PROCEDURE — 93296 REM INTERROG EVL PM/IDS: CPT

## 2019-05-20 ENCOUNTER — EMERGENCY (EMERGENCY)
Facility: HOSPITAL | Age: 84
LOS: 0 days | Discharge: HOME | End: 2019-05-21
Attending: EMERGENCY MEDICINE
Payer: MEDICARE

## 2019-05-20 VITALS
SYSTOLIC BLOOD PRESSURE: 99 MMHG | HEIGHT: 64 IN | DIASTOLIC BLOOD PRESSURE: 72 MMHG | HEART RATE: 67 BPM | RESPIRATION RATE: 16 BRPM | OXYGEN SATURATION: 95 % | WEIGHT: 100.09 LBS | TEMPERATURE: 98 F

## 2019-05-20 DIAGNOSIS — M54.5 LOW BACK PAIN: ICD-10-CM

## 2019-05-20 DIAGNOSIS — Z79.82 LONG TERM (CURRENT) USE OF ASPIRIN: ICD-10-CM

## 2019-05-20 DIAGNOSIS — R53.1 WEAKNESS: ICD-10-CM

## 2019-05-20 DIAGNOSIS — Z88.9 ALLERGY STATUS TO UNSPECIFIED DRUGS, MEDICAMENTS AND BIOLOGICAL SUBSTANCES: ICD-10-CM

## 2019-05-20 DIAGNOSIS — H53.8 OTHER VISUAL DISTURBANCES: ICD-10-CM

## 2019-05-20 DIAGNOSIS — Z96.659 PRESENCE OF UNSPECIFIED ARTIFICIAL KNEE JOINT: Chronic | ICD-10-CM

## 2019-05-20 DIAGNOSIS — R42 DIZZINESS AND GIDDINESS: ICD-10-CM

## 2019-05-20 DIAGNOSIS — Z95.0 PRESENCE OF CARDIAC PACEMAKER: Chronic | ICD-10-CM

## 2019-05-20 LAB
ALBUMIN SERPL ELPH-MCNC: 3.4 G/DL — SIGNIFICANT CHANGE UP (ref 3.3–5)
ALP SERPL-CCNC: 62 U/L — SIGNIFICANT CHANGE UP (ref 40–120)
ALT FLD-CCNC: 12 U/L — SIGNIFICANT CHANGE UP (ref 12–78)
ANION GAP SERPL CALC-SCNC: 9 MMOL/L — SIGNIFICANT CHANGE UP (ref 5–17)
APTT BLD: 30.3 SEC — SIGNIFICANT CHANGE UP (ref 28.5–37)
AST SERPL-CCNC: 17 U/L — SIGNIFICANT CHANGE UP (ref 15–37)
BASOPHILS # BLD AUTO: 0.02 K/UL — SIGNIFICANT CHANGE UP (ref 0–0.2)
BASOPHILS NFR BLD AUTO: 0.3 % — SIGNIFICANT CHANGE UP (ref 0–2)
BILIRUB SERPL-MCNC: 1.8 MG/DL — HIGH (ref 0.2–1.2)
BUN SERPL-MCNC: 14 MG/DL — SIGNIFICANT CHANGE UP (ref 7–23)
CALCIUM SERPL-MCNC: 8.7 MG/DL — SIGNIFICANT CHANGE UP (ref 8.5–10.1)
CHLORIDE SERPL-SCNC: 100 MMOL/L — SIGNIFICANT CHANGE UP (ref 96–108)
CK MB CFR SERPL CALC: <1 NG/ML — SIGNIFICANT CHANGE UP (ref 0.5–3.6)
CO2 SERPL-SCNC: 27 MMOL/L — SIGNIFICANT CHANGE UP (ref 22–31)
CREAT SERPL-MCNC: 0.61 MG/DL — SIGNIFICANT CHANGE UP (ref 0.5–1.3)
EOSINOPHIL # BLD AUTO: 0.02 K/UL — SIGNIFICANT CHANGE UP (ref 0–0.5)
EOSINOPHIL NFR BLD AUTO: 0.3 % — SIGNIFICANT CHANGE UP (ref 0–6)
GLUCOSE SERPL-MCNC: 93 MG/DL — SIGNIFICANT CHANGE UP (ref 70–99)
HCT VFR BLD CALC: 43 % — SIGNIFICANT CHANGE UP (ref 34.5–45)
HGB BLD-MCNC: 13.8 G/DL — SIGNIFICANT CHANGE UP (ref 11.5–15.5)
IMM GRANULOCYTES NFR BLD AUTO: 0.3 % — SIGNIFICANT CHANGE UP (ref 0–1.5)
INR BLD: 1.1 RATIO — SIGNIFICANT CHANGE UP (ref 0.88–1.16)
LYMPHOCYTES # BLD AUTO: 0.79 K/UL — LOW (ref 1–3.3)
LYMPHOCYTES # BLD AUTO: 13.6 % — SIGNIFICANT CHANGE UP (ref 13–44)
MAGNESIUM SERPL-MCNC: 2.4 MG/DL — SIGNIFICANT CHANGE UP (ref 1.6–2.6)
MCHC RBC-ENTMCNC: 31.8 PG — SIGNIFICANT CHANGE UP (ref 27–34)
MCHC RBC-ENTMCNC: 32.1 GM/DL — SIGNIFICANT CHANGE UP (ref 32–36)
MCV RBC AUTO: 99.1 FL — SIGNIFICANT CHANGE UP (ref 80–100)
MONOCYTES # BLD AUTO: 0.33 K/UL — SIGNIFICANT CHANGE UP (ref 0–0.9)
MONOCYTES NFR BLD AUTO: 5.7 % — SIGNIFICANT CHANGE UP (ref 2–14)
NEUTROPHILS # BLD AUTO: 4.63 K/UL — SIGNIFICANT CHANGE UP (ref 1.8–7.4)
NEUTROPHILS NFR BLD AUTO: 79.8 % — HIGH (ref 43–77)
NRBC # BLD: 0 /100 WBCS — SIGNIFICANT CHANGE UP (ref 0–0)
NT-PROBNP SERPL-SCNC: 125 PG/ML — SIGNIFICANT CHANGE UP (ref 0–450)
PLATELET # BLD AUTO: 153 K/UL — SIGNIFICANT CHANGE UP (ref 150–400)
POTASSIUM SERPL-MCNC: 4.4 MMOL/L — SIGNIFICANT CHANGE UP (ref 3.5–5.3)
POTASSIUM SERPL-SCNC: 4.4 MMOL/L — SIGNIFICANT CHANGE UP (ref 3.5–5.3)
PROT SERPL-MCNC: 7.7 GM/DL — SIGNIFICANT CHANGE UP (ref 6–8.3)
PROTHROM AB SERPL-ACNC: 12.3 SEC — SIGNIFICANT CHANGE UP (ref 10–12.9)
RBC # BLD: 4.34 M/UL — SIGNIFICANT CHANGE UP (ref 3.8–5.2)
RBC # FLD: 13.5 % — SIGNIFICANT CHANGE UP (ref 10.3–14.5)
SODIUM SERPL-SCNC: 136 MMOL/L — SIGNIFICANT CHANGE UP (ref 135–145)
TROPONIN I SERPL-MCNC: <.015 NG/ML — SIGNIFICANT CHANGE UP (ref 0.01–0.04)
WBC # BLD: 5.81 K/UL — SIGNIFICANT CHANGE UP (ref 3.8–10.5)
WBC # FLD AUTO: 5.81 K/UL — SIGNIFICANT CHANGE UP (ref 3.8–10.5)

## 2019-05-20 PROCEDURE — 70450 CT HEAD/BRAIN W/O DYE: CPT | Mod: 26

## 2019-05-20 PROCEDURE — 71045 X-RAY EXAM CHEST 1 VIEW: CPT | Mod: 26

## 2019-05-20 PROCEDURE — 76376 3D RENDER W/INTRP POSTPROCES: CPT | Mod: 26

## 2019-05-20 PROCEDURE — 99285 EMERGENCY DEPT VISIT HI MDM: CPT

## 2019-05-20 PROCEDURE — 72131 CT LUMBAR SPINE W/O DYE: CPT | Mod: 26

## 2019-05-20 PROCEDURE — 72128 CT CHEST SPINE W/O DYE: CPT | Mod: 26

## 2019-05-20 RX ORDER — SODIUM CHLORIDE 9 MG/ML
2000 INJECTION INTRAMUSCULAR; INTRAVENOUS; SUBCUTANEOUS ONCE
Refills: 0 | Status: COMPLETED | OUTPATIENT
Start: 2019-05-20 | End: 2019-05-20

## 2019-05-20 RX ADMIN — SODIUM CHLORIDE 2000 MILLILITER(S): 9 INJECTION INTRAMUSCULAR; INTRAVENOUS; SUBCUTANEOUS at 21:12
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
## 2019-05-20 NOTE — ED PROVIDER NOTE - CLINICAL SUMMARY MEDICAL DECISION MAKING FREE TEXT BOX
pt pw fall and weakness. though no acute fx suspect, she seems an inapprorpiate candidate to live by herself. pt pw fall and weakness. though no acute fx suspect, she seems an inappropriate candidate to live by herself.

## 2019-05-20 NOTE — ED PROVIDER NOTE - CARE PROVIDER_API CALL
Anjel Hansen (DO)  Orthopaedic Surgery  [address] [place] [address]  Phone: [phone]  Fax: [phone]  Follow Up Time: 4-6 Days

## 2019-05-20 NOTE — ED PROVIDER NOTE - PHYSICAL EXAMINATION
Gen: elderly and frail.   Head: NC, AT   Eyes: PERRL, EOMI, normal lids/conjunctiva  ENT: decreased hearing, patent oropharynx without erythema/exudate, uvula midline  Neck: supple, no tenderness, Trachea midline  Pulm: Bilateral BS, normal resp effort, no wheeze/stridor/retractions  CV: RRR, no M/R/G, 2+ radial and dp pulses bl, no edema  Abd: soft, NT/ND, +BS, no hepatosplenomegaly  Mskel: extremities x4 with normal ROM and no joint effusions. no ctl spine ttp.   Skin: no rash, no bruising   Neuro: AAOx2, no sensory/motor deficits, CN 2-12 intact, hypophonic

## 2019-05-20 NOTE — ED ADULT NURSE NOTE - ED STAT RN HANDOFF DETAILS
Patient lying comfortably in stretcher, no distress. Patient just returned from CT. EKG to be done. Handoff given to JASMIN Amaya

## 2019-05-20 NOTE — ED ADULT NURSE NOTE - NSIMPLEMENTINTERV_GEN_ALL_ED
Implemented All Universal Safety Interventions:  [place] to call system. Call bell, personal items and telephone within reach. Instruct patient to call for assistance. Room bathroom lighting operational. Non-slip footwear when patient is off stretcher. Physically safe environment: no spills, clutter or unnecessary equipment. Stretcher in lowest position, wheels locked, appropriate side rails in place.

## 2019-05-20 NOTE — ED PROVIDER NOTE - PROGRESS NOTE DETAILS
Results reported to patient--grossly benign, labs wnl, CT imaging shows many compression fractures of spine throughout  Pt. reports feeling better after meds  pt. agrees to f/u with primary care outpt., will refer to ortho for f/u  pt. understands to return to ED if symptoms worsen; will d/c spoke with son, Vasyl who's coming to hospital to  patient  He explains that pt. has a home health aid, and compression fractures are all known  pt. is safe at home, she follows with doctors and she is watched carefully, will d/c to son when he arrives

## 2019-05-21 VITALS
SYSTOLIC BLOOD PRESSURE: 116 MMHG | HEART RATE: 77 BPM | OXYGEN SATURATION: 97 % | RESPIRATION RATE: 18 BRPM | DIASTOLIC BLOOD PRESSURE: 88 MMHG | TEMPERATURE: 98 F

## 2019-05-21 RX ADMIN — SODIUM CHLORIDE 2000 MILLILITER(S): 9 INJECTION INTRAMUSCULAR; INTRAVENOUS; SUBCUTANEOUS at 02:12

## 2019-06-05 NOTE — ED PROVIDER NOTE - PHYSICAL EXAMINATION
I am committed to providing you the best care possible. If you receive a survey after visiting our office, please take time to share your experience concerning your office visit. These surveys are confidential and no health information about you is shared. I am eager to improve for you and I am counting on your feedback to help make that happen. You will get a call to schedule your appointment with the orthopedic specialist          Patient Education        Knee Pain or Injury: Care Instructions  Your Care Instructions    Injuries are a common cause of knee problems. Sudden (acute) injuries may be caused by a direct blow to the knee. They can also be caused by abnormal twisting, bending, or falling on the knee. Pain, bruising, or swelling may be severe, and may start within minutes of the injury. Overuse is another cause of knee pain. Other causes are climbing stairs, kneeling, and other activities that use the knee. Everyday wear and tear, especially as you get older, also can cause knee pain. Rest, along with home treatment, often relieves pain and allows your knee to heal. If you have a serious knee injury, you may need tests and treatment. Follow-up care is a key part of your treatment and safety. Be sure to make and go to all appointments, and call your doctor if you are having problems. It's also a good idea to know your test results and keep a list of the medicines you take. How can you care for yourself at home? · Be safe with medicines. Read and follow all instructions on the label. ? If the doctor gave you a prescription medicine for pain, take it as prescribed. ? If you are not taking a prescription pain medicine, ask your doctor if you can take an over-the-counter medicine. · Rest and protect your knee. Take a break from any activity that may cause pain. · Put ice or a cold pack on your knee for 10 to 20 minutes at a time. Put a thin cloth between the ice and your skin.   · Prop up a Gen: Alert, NAD  Head: NC, AT   Eyes: PERRL, EOMI, normal lids/conjunctiva  ENT: normal hearing, patent oropharynx without erythema/exudate, uvula midline  Neck: supple, no tenderness, Trachea midline  Pulm: Bilateral BS, normal resp effort, no wheeze/stridor/retractions  CV: RRR, no M/R/G, 2+ radial and dp pulses bl, no edema. paceker left chest.   Abd: soft, NT/ND, +BS, no hepatosplenomegaly  Mskel: cachectic, extremities x4 with normal ROM and no joint effusions. no ctl spine ttp.   Skin: no rash, no bruising   Neuro: AAOx3, no sensory/motor deficits, CN 2-12 intact symptoms, such as swelling.     · You have bruises from a knee injury that last longer than 2 weeks.     · You do not get better as expected. Where can you learn more? Go to [email or web address] org and sign in to your SendMe account. Enter K195 in the [facility] box to learn more about \"Knee Pain or Injury: Care Instructions. \"     If you do not have an account, please click on the \"Sign Up Now\" link. Current as of: 2018  Content Version: 12.0  © 1639-8589 [employer]. Care instructions adapted under license by [facility] ([facility]). If you have questions about a medical condition or this instruction, always ask your healthcare professional. [address] any warranty or liability for your use of this information. Patient Education        Tdap (Tetanus, Diphtheria, Pertussis) Vaccine: What You Need to Know  Why get vaccinated? Tetanus, diphtheria, and pertussis are very serious diseases. Tdap vaccine can protect us from these diseases. And Tdap vaccine given to pregnant women can protect  babies against pertussis. Tetanus (lockjaw) is rare in the [facility] today. It causes painful muscle tightening and stiffness, usually all over the body. · It can lead to tightening of muscles in the head and neck so you can't open your mouth, swallow, or sometimes even breathe. Tetanus kills about 1 out of 10 people who are infected even after receiving the best medical care. Diphtheria is also rare in the [place] today. It can cause a thick coating to form in the back of the throat. · It can lead to breathing problems, heart failure, paralysis, and death. Pertussis (whooping cough) causes severe coughing spells, which can cause difficulty breathing, vomiting, and disturbed sleep. · It can also lead to weight loss, incontinence, and rib fractures.  Up to 2 in 100 adolescents and 5 in 100 adults with pertussis are hospitalized or have complications, which could include pneumonia or death. These diseases are caused by bacteria. Diphtheria and pertussis are spread from person to person through secretions from coughing or sneezing. Tetanus enters the body through cuts, scratches, or wounds. Before vaccines, as many as 200,000 cases of diphtheria, 200,000 cases of pertussis, and hundreds of cases of tetanus were reported in the [place] each year. Since vaccination began, reports of cases for tetanus and diphtheria have dropped by about 99% and for pertussis by about 80%. Tdap vaccine  The Tdap vaccine can protect adolescents and adults from tetanus, diphtheria, and pertussis. One dose of Tdap is routinely given at age 6 or 15. People who did not get Tdap at that age should get it as soon as possible. Tdap is especially important for health care professionals and anyone having close contact with a baby younger than 12 months. Pregnant women should get a dose of Tdap during every pregnancy, to protect the  from pertussis. Infants are most at risk for severe, life-threatening complications from pertussis. Another vaccine, called Td, protects against tetanus and diphtheria, but not pertussis. A Td booster should be given every 10 years. Tdap may be given as one of these boosters if you have never gotten Tdap before. Tdap may also be given after a severe cut or burn to prevent tetanus infection. Your doctor or the person giving you the vaccine can give you more information. Tdap may safely be given at the same time as other vaccines. Some people should not get this vaccine  · A person who has ever had a life-threatening allergic reaction after a previous dose of any diphtheria-, tetanus-, or pertussis-containing vaccine, OR has a severe allergy to any part of this vaccine, should not get Tdap vaccine. Tell the person giving the vaccine about any severe allergies.   · Anyone who had coma or long repeated seizures within 7 days after a childhood dose of DTP or DTaP, or a previous dose of Tdap, should not get Tdap, unless a cause other than the vaccine was found. They can still get Td. · Talk to your doctor if you:  ? Have seizures or another nervous system problem. ? Had severe pain or swelling after any vaccine containing diphtheria, tetanus, or pertussis. ? Ever had a condition called Guillain-Barré Syndrome (GBS). ? Aren't feeling well on the day the shot is scheduled. Risks  With any medicine, including vaccines, there is a chance of side effects. These are usually mild and go away on their own. Serious reactions are also possible but are rare. Most people who get Tdap vaccine do not have any problems with it.   Mild problems following Tdap  (Did not interfere with activities)  · Pain where the shot was given (about 3 in 4 adolescents or 2 in 3 adults)  · Redness or swelling where the shot was given (about 1 person in 5)  · Mild fever of at least 100.4°F (up to about 1 in 25 adolescents or 1 in 100 adults)  · Headache (about 3 or 4 people in 10)  · Tiredness (about 1 person in 3 or 4)  · Nausea, vomiting, diarrhea, stomachache (up to 1 in 4 adolescents or 1 in 10 adults)  · Chills, sore joints (about 1 person in 10)  · Body aches (about 1 person in 3 or 4)  · Rash, swollen glands (uncommon)  Moderate problems following Tdap  (Interfered with activities, but did not require medical attention)  · Pain where the shot was given (up to 1 in 5 or 6)  · Redness or swelling where the shot was given (up to about 1 in 16 adolescents or 1 in 12 adults)  · Fever over 102°F (about 1 in 100 adolescents or 1 in 250 adults)  · Headache (about 1 in 7 adolescents or 1 in 10 adults)  · Nausea, vomiting, diarrhea, stomachache (up to 1 to 3 people in 100)  · Swelling of the entire arm where the shot was given (up to about 1 in 500)  Severe problems following Tdap  (Unable to perform usual activities; required medical attention)  · Swelling, severe that was created to compensate people who may have been injured by certain vaccines. Persons who believe they may have been injured by a vaccine can learn about the program and about filing a claim by calling 8-862.673.5347 or visiting the [employer] website at www.[facility].gov/vaccinecompensation. There is a time limit to file a claim for compensation. How can I learn more? · Ask your doctor. He or she can give you the vaccine package insert or suggest other sources of information. · Call your local or state health department. · Contact the Centers for Disease Control and Prevention (CDC):  ? Call 1-809.433.9509 (0-035-KAC-INFO) or  ? Visit CDC's website at www.cdc.gov/vaccines  Vaccine Information Statement (Interim)  Tdap Vaccine  (2/24/15)  [address]  Department of Health and Human Services  Centers for Disease Control and Prevention  Many Vaccine Information Statements are available in [place] and other languages. See www.immunize.org/vis. Muchas hojas de información sobre vacunas están disponibles en español y en otros idiomas. Visite www.immunize.org/vis. Care instructions adapted under license by [facility] ([facility]). If you have questions about a medical condition or this instruction, always ask your healthcare professional. [address] any warranty or liability for your use of this information.

## 2019-06-07 ENCOUNTER — APPOINTMENT (OUTPATIENT)
Dept: ELECTROPHYSIOLOGY | Facility: CLINIC | Age: 84
End: 2019-06-07
Payer: MEDICARE

## 2019-06-07 PROCEDURE — 93283 PRGRMG EVAL IMPLANTABLE DFB: CPT

## 2019-06-07 RX ORDER — TRAMADOL HYDROCHLORIDE 25 MG/1
TABLET, COATED ORAL 3 TIMES DAILY
Refills: 0 | Status: DISCONTINUED | COMMUNITY
End: 2019-06-07

## 2019-06-07 RX ORDER — OMEPRAZOLE 40 MG/1
40 CAPSULE, DELAYED RELEASE ORAL DAILY
Refills: 0 | Status: DISCONTINUED | COMMUNITY
End: 2019-06-07

## 2019-06-07 RX ORDER — IBUPROFEN 400 MG/1
400 TABLET, FILM COATED ORAL 3 TIMES DAILY
Refills: 0 | Status: DISCONTINUED | COMMUNITY
End: 2019-06-07

## 2019-06-14 ENCOUNTER — EMERGENCY (EMERGENCY)
Facility: HOSPITAL | Age: 84
LOS: 0 days | Discharge: HOME | End: 2019-06-14
Attending: EMERGENCY MEDICINE
Payer: MEDICARE

## 2019-06-14 VITALS
OXYGEN SATURATION: 98 % | TEMPERATURE: 98 F | HEART RATE: 63 BPM | RESPIRATION RATE: 17 BRPM | SYSTOLIC BLOOD PRESSURE: 112 MMHG | DIASTOLIC BLOOD PRESSURE: 65 MMHG

## 2019-06-14 VITALS
HEIGHT: 65 IN | HEART RATE: 67 BPM | DIASTOLIC BLOOD PRESSURE: 77 MMHG | TEMPERATURE: 98 F | WEIGHT: 119.93 LBS | SYSTOLIC BLOOD PRESSURE: 104 MMHG | OXYGEN SATURATION: 96 % | RESPIRATION RATE: 17 BRPM

## 2019-06-14 DIAGNOSIS — B02.9 ZOSTER WITHOUT COMPLICATIONS: ICD-10-CM
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
## 2019-06-14 DIAGNOSIS — Z88.9 ALLERGY STATUS TO UNSPECIFIED DRUGS, MEDICAMENTS AND BIOLOGICAL SUBSTANCES: ICD-10-CM

## 2019-06-14 DIAGNOSIS — M54.5 LOW BACK PAIN: ICD-10-CM

## 2019-06-14 DIAGNOSIS — E03.9 HYPOTHYROIDISM, UNSPECIFIED: ICD-10-CM

## 2019-06-14 DIAGNOSIS — R10.9 UNSPECIFIED ABDOMINAL PAIN: ICD-10-CM

## 2019-06-14 DIAGNOSIS — Z95.0 PRESENCE OF CARDIAC PACEMAKER: Chronic | ICD-10-CM

## 2019-06-14 DIAGNOSIS — M54.9 DORSALGIA, UNSPECIFIED: ICD-10-CM

## 2019-06-14 DIAGNOSIS — Z96.651 PRESENCE OF RIGHT ARTIFICIAL KNEE JOINT: ICD-10-CM

## 2019-06-14 DIAGNOSIS — Z95.0 PRESENCE OF CARDIAC PACEMAKER: ICD-10-CM

## 2019-06-14 DIAGNOSIS — I10 ESSENTIAL (PRIMARY) HYPERTENSION: ICD-10-CM

## 2019-06-14 DIAGNOSIS — G89.29 OTHER CHRONIC PAIN: ICD-10-CM

## 2019-06-14 DIAGNOSIS — Z96.659 PRESENCE OF UNSPECIFIED ARTIFICIAL KNEE JOINT: Chronic | ICD-10-CM

## 2019-06-14 DIAGNOSIS — S22.059A UNSPECIFIED FRACTURE OF T5-T6 VERTEBRA, INITIAL ENCOUNTER FOR CLOSED FRACTURE: ICD-10-CM

## 2019-06-14 DIAGNOSIS — S22.069A UNSPECIFIED FRACTURE OF T7-T8 VERTEBRA, INITIAL ENCOUNTER FOR CLOSED FRACTURE: ICD-10-CM

## 2019-06-14 DIAGNOSIS — Z79.82 LONG TERM (CURRENT) USE OF ASPIRIN: ICD-10-CM

## 2019-06-14 DIAGNOSIS — E78.00 PURE HYPERCHOLESTEROLEMIA, UNSPECIFIED: ICD-10-CM
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
## 2019-06-14 DIAGNOSIS — I48.91 UNSPECIFIED ATRIAL FIBRILLATION: ICD-10-CM

## 2019-06-14 LAB
ALBUMIN SERPL ELPH-MCNC: 3.3 G/DL — SIGNIFICANT CHANGE UP (ref 3.3–5)
ALP SERPL-CCNC: 71 U/L — SIGNIFICANT CHANGE UP (ref 40–120)
ALT FLD-CCNC: 14 U/L — SIGNIFICANT CHANGE UP (ref 12–78)
ANION GAP SERPL CALC-SCNC: 7 MMOL/L — SIGNIFICANT CHANGE UP (ref 5–17)
APTT BLD: 31.3 SEC — SIGNIFICANT CHANGE UP (ref 27.5–36.3)
AST SERPL-CCNC: 16 U/L — SIGNIFICANT CHANGE UP (ref 15–37)
BILIRUB SERPL-MCNC: 1.5 MG/DL — HIGH (ref 0.2–1.2)
BUN SERPL-MCNC: 11 MG/DL — SIGNIFICANT CHANGE UP (ref 7–23)
CALCIUM SERPL-MCNC: 8.8 MG/DL — SIGNIFICANT CHANGE UP (ref 8.5–10.1)
CHLORIDE SERPL-SCNC: 107 MMOL/L — SIGNIFICANT CHANGE UP (ref 96–108)
CO2 SERPL-SCNC: 29 MMOL/L — SIGNIFICANT CHANGE UP (ref 22–31)
CREAT SERPL-MCNC: 0.68 MG/DL — SIGNIFICANT CHANGE UP (ref 0.5–1.3)
GLUCOSE SERPL-MCNC: 110 MG/DL — HIGH (ref 70–99)
HCT VFR BLD CALC: 41.3 % — SIGNIFICANT CHANGE UP (ref 34.5–45)
HGB BLD-MCNC: 13.5 G/DL — SIGNIFICANT CHANGE UP (ref 11.5–15.5)
INR BLD: 1.15 RATIO — SIGNIFICANT CHANGE UP (ref 0.88–1.16)
MCHC RBC-ENTMCNC: 32.7 GM/DL — SIGNIFICANT CHANGE UP (ref 32–36)
MCHC RBC-ENTMCNC: 32.8 PG — SIGNIFICANT CHANGE UP (ref 27–34)
MCV RBC AUTO: 100.2 FL — HIGH (ref 80–100)
NRBC # BLD: 0 /100 WBCS — SIGNIFICANT CHANGE UP (ref 0–0)
PLATELET # BLD AUTO: 136 K/UL — LOW (ref 150–400)
POTASSIUM SERPL-MCNC: 3.8 MMOL/L — SIGNIFICANT CHANGE UP (ref 3.5–5.3)
POTASSIUM SERPL-SCNC: 3.8 MMOL/L — SIGNIFICANT CHANGE UP (ref 3.5–5.3)
PROT SERPL-MCNC: 7.1 GM/DL — SIGNIFICANT CHANGE UP (ref 6–8.3)
PROTHROM AB SERPL-ACNC: 12.9 SEC — SIGNIFICANT CHANGE UP (ref 10–12.9)
RBC # BLD: 4.12 M/UL — SIGNIFICANT CHANGE UP (ref 3.8–5.2)
RBC # FLD: 14.1 % — SIGNIFICANT CHANGE UP (ref 10.3–14.5)
SODIUM SERPL-SCNC: 143 MMOL/L — SIGNIFICANT CHANGE UP (ref 135–145)
WBC # BLD: 5.03 K/UL — SIGNIFICANT CHANGE UP (ref 3.8–10.5)
WBC # FLD AUTO: 5.03 K/UL — SIGNIFICANT CHANGE UP (ref 3.8–10.5)

## 2019-06-14 PROCEDURE — 72128 CT CHEST SPINE W/O DYE: CPT | Mod: 26

## 2019-06-14 PROCEDURE — 72131 CT LUMBAR SPINE W/O DYE: CPT | Mod: 26

## 2019-06-14 PROCEDURE — 99285 EMERGENCY DEPT VISIT HI MDM: CPT | Mod: 25

## 2019-06-14 RX ORDER — MORPHINE SULFATE 50 MG/1
4 CAPSULE, EXTENDED RELEASE ORAL ONCE
Refills: 0 | Status: DISCONTINUED | OUTPATIENT
Start: 2019-06-14 | End: 2019-06-14

## 2019-06-14 RX ORDER — CYCLOBENZAPRINE HYDROCHLORIDE 10 MG/1
10 TABLET, FILM COATED ORAL ONCE
Refills: 0 | Status: COMPLETED | OUTPATIENT
Start: 2019-06-14 | End: 2019-06-14

## 2019-06-14 RX ORDER — ACETAMINOPHEN 500 MG
975 TABLET ORAL ONCE
Refills: 0 | Status: COMPLETED | OUTPATIENT
Start: 2019-06-14 | End: 2019-06-14

## 2019-06-14 RX ADMIN — MORPHINE SULFATE 4 MILLIGRAM(S): 50 CAPSULE, EXTENDED RELEASE ORAL at 03:58

## 2019-06-14 RX ADMIN — CYCLOBENZAPRINE HYDROCHLORIDE 10 MILLIGRAM(S): 10 TABLET, FILM COATED ORAL at 13:09

## 2019-06-14 RX ADMIN — Medication 975 MILLIGRAM(S): at 03:51

## 2019-06-14 NOTE — ED ADULT NURSE NOTE - CHPI ED NUR SYMPTOMS NEG
no abrasion/no difficulty bearing weight/no deformity/no tingling/no back pain/no bruising/no fever/no numbness/no stiffness

## 2019-06-14 NOTE — ED PROVIDER NOTE - PHYSICAL EXAMINATION
Vitals: WNL  Gen: AAOx3, NAD, sitting uncomfortably in stretcher severe pain with adjustment of self in stretcher   Head: ncat, perrla, eomi b/l  Neck: supple, no lymphadenopathy, no midline deviation  Heart: rrr, no m/r/g  Lungs: CTA b/l, no rales/ronchi/wheezes  Abd: soft, nontender, non-distended, no rebound or guarding  Ext: no clubbing/cyanosis/edema  Neuro: sensation and muscle strength intact b/l, steady gait   back: no superficial evidence for trauma over skin, + tender to palpation over thoracic vertebre and ribs on R from T9-L3, + paraspinal muscle hypertonicity, no skin break, no stepoff

## 2019-06-14 NOTE — ED PROVIDER NOTE - PROGRESS NOTE DETAILS
Results reported to patient--grossly benign, labs wnl, CT shows no acute pathology, + chronic fractures just as before  Pt. reports feeling better after meds  pt. agrees to f/u with primary care outpt., will refer to ortho for f/u  pt. understands to return to ED if symptoms worsen; will d/c to son

## 2019-06-14 NOTE — ED PROVIDER NOTE - OBJECTIVE STATEMENT
88 yo F with R flank, R lower back, R thoracic back pain for weeks after fall (fall 4 wk prior).  Pt. says she was evaluated after the fall.  compression fractures noted at the time.  Pt. reports more falls since.  Pain is severe now, localized, no radiation.  No urinary complaints.  Pain worse with movement.  No other complaints.  No head trauma, as per patient.  No incontinence or saddle anesthesia.   ROS: negative for fever, cough, headache, chest pain, shortness of breath, abd pain, nausea, vomiting, diarrhea, rash, paresthesia, and weakness--all other systems reviewed are negative.   PMH: a-fib, chronic back pain, OA, herpes zoster, HTN, HLD, hypothyroid, multiple chronic lumbar and thoracic vertebral fractures; Meds: See EMR; SH: Denies smoking/drinking/drug use

## 2019-06-14 NOTE — ED ADULT NURSE NOTE - PSH
H/O cardiac pacemaker  [employer] placed 12/10  S/P Colonoscopic Polypectomy  15 years ago per pt  S/P TKR (total knee replacement)  Right TKR in 5/16

## 2019-06-14 NOTE — ED PROVIDER NOTE - PSH
H/O cardiac pacemaker  [employer] placed 12/10  S/P Colonoscopic Polypectomy  15 years ago per pt  S/P TKR (total knee replacement)  Right TKR in 5/16

## 2019-06-14 NOTE — ED PROVIDER NOTE - CLINICAL SUMMARY MEDICAL DECISION MAKING FREE TEXT BOX
88 yo F with acute on chronic back pain, concerning for spinal fractures, rib fractures  -chart reviewed shows prior falls with multiple thoracic and lumbar fractures  -CT lumbar/thoracic, tylenol/morphine prn, iv line, basic albs, coags, ua, cx  -f/u results, reeval

## 2019-06-14 NOTE — ED PROVIDER NOTE - CARE PROVIDER_API CALL
Margarito Crenshaw)  Internal Medicine  [address], [address]  [place] [address]  Phone: [phone]  Fax: [phone]  Follow Up Time: 4-6 Days

## 2019-06-15 ENCOUNTER — INPATIENT (INPATIENT)
Facility: HOSPITAL | Age: 84
LOS: 3 days | Discharge: NURSING, REHAB OR LONG-TERM CARE | End: 2019-06-19
Attending: INTERNAL MEDICINE | Admitting: INTERNAL MEDICINE
Payer: MEDICARE

## 2019-06-15 VITALS
DIASTOLIC BLOOD PRESSURE: 87 MMHG | TEMPERATURE: 98 F | HEIGHT: 65 IN | WEIGHT: 119.93 LBS | RESPIRATION RATE: 16 BRPM | HEART RATE: 68 BPM | SYSTOLIC BLOOD PRESSURE: 120 MMHG | OXYGEN SATURATION: 95 %

## 2019-06-15 DIAGNOSIS — Z95.0 PRESENCE OF CARDIAC PACEMAKER: Chronic | ICD-10-CM

## 2019-06-15 DIAGNOSIS — R26.2 DIFFICULTY IN WALKING, NOT ELSEWHERE CLASSIFIED: ICD-10-CM

## 2019-06-15 DIAGNOSIS — Z96.659 PRESENCE OF UNSPECIFIED ARTIFICIAL KNEE JOINT: Chronic | ICD-10-CM

## 2019-06-15 DIAGNOSIS — M54.5 LOW BACK PAIN: ICD-10-CM

## 2019-06-15 LAB
ALBUMIN SERPL ELPH-MCNC: 3.4 G/DL — SIGNIFICANT CHANGE UP (ref 3.3–5)
ALP SERPL-CCNC: 73 U/L — SIGNIFICANT CHANGE UP (ref 40–120)
ALT FLD-CCNC: 14 U/L — SIGNIFICANT CHANGE UP (ref 12–78)
ANION GAP SERPL CALC-SCNC: 7 MMOL/L — SIGNIFICANT CHANGE UP (ref 5–17)
APPEARANCE UR: CLEAR — SIGNIFICANT CHANGE UP
APTT BLD: 33.4 SEC — SIGNIFICANT CHANGE UP (ref 28.5–37)
AST SERPL-CCNC: 16 U/L — SIGNIFICANT CHANGE UP (ref 15–37)
BACTERIA # UR AUTO: ABNORMAL
BASOPHILS # BLD AUTO: 0.01 K/UL — SIGNIFICANT CHANGE UP (ref 0–0.2)
BASOPHILS NFR BLD AUTO: 0.2 % — SIGNIFICANT CHANGE UP (ref 0–2)
BILIRUB SERPL-MCNC: 2 MG/DL — HIGH (ref 0.2–1.2)
BILIRUB UR-MCNC: NEGATIVE — SIGNIFICANT CHANGE UP
BUN SERPL-MCNC: 10 MG/DL — SIGNIFICANT CHANGE UP (ref 7–23)
CALCIUM SERPL-MCNC: 9 MG/DL — SIGNIFICANT CHANGE UP (ref 8.5–10.1)
CHLORIDE SERPL-SCNC: 105 MMOL/L — SIGNIFICANT CHANGE UP (ref 96–108)
CO2 SERPL-SCNC: 28 MMOL/L — SIGNIFICANT CHANGE UP (ref 22–31)
COLOR SPEC: YELLOW — SIGNIFICANT CHANGE UP
CREAT SERPL-MCNC: 0.53 MG/DL — SIGNIFICANT CHANGE UP (ref 0.5–1.3)
DIFF PNL FLD: ABNORMAL
EOSINOPHIL # BLD AUTO: 0.01 K/UL — SIGNIFICANT CHANGE UP (ref 0–0.5)
EOSINOPHIL NFR BLD AUTO: 0.2 % — SIGNIFICANT CHANGE UP (ref 0–6)
EPI CELLS # UR: SIGNIFICANT CHANGE UP
GLUCOSE SERPL-MCNC: 95 MG/DL — SIGNIFICANT CHANGE UP (ref 70–99)
GLUCOSE UR QL: NEGATIVE MG/DL — SIGNIFICANT CHANGE UP
HCT VFR BLD CALC: 42.1 % — SIGNIFICANT CHANGE UP (ref 34.5–45)
HGB BLD-MCNC: 13.8 G/DL — SIGNIFICANT CHANGE UP (ref 11.5–15.5)
IMM GRANULOCYTES NFR BLD AUTO: 0.3 % — SIGNIFICANT CHANGE UP (ref 0–1.5)
INR BLD: 1.09 RATIO — SIGNIFICANT CHANGE UP (ref 0.88–1.16)
KETONES UR-MCNC: ABNORMAL
LEUKOCYTE ESTERASE UR-ACNC: ABNORMAL
LYMPHOCYTES # BLD AUTO: 0.77 K/UL — LOW (ref 1–3.3)
LYMPHOCYTES # BLD AUTO: 13 % — SIGNIFICANT CHANGE UP (ref 13–44)
MCHC RBC-ENTMCNC: 32.6 PG — SIGNIFICANT CHANGE UP (ref 27–34)
MCHC RBC-ENTMCNC: 32.8 GM/DL — SIGNIFICANT CHANGE UP (ref 32–36)
MCV RBC AUTO: 99.5 FL — SIGNIFICANT CHANGE UP (ref 80–100)
MONOCYTES # BLD AUTO: 0.32 K/UL — SIGNIFICANT CHANGE UP (ref 0–0.9)
MONOCYTES NFR BLD AUTO: 5.4 % — SIGNIFICANT CHANGE UP (ref 2–14)
NEUTROPHILS # BLD AUTO: 4.78 K/UL — SIGNIFICANT CHANGE UP (ref 1.8–7.4)
NEUTROPHILS NFR BLD AUTO: 80.9 % — HIGH (ref 43–77)
NITRITE UR-MCNC: NEGATIVE — SIGNIFICANT CHANGE UP
NRBC # BLD: 0 /100 WBCS — SIGNIFICANT CHANGE UP (ref 0–0)
PH UR: 5 — SIGNIFICANT CHANGE UP (ref 5–8)
PLATELET # BLD AUTO: 140 K/UL — LOW (ref 150–400)
POTASSIUM SERPL-MCNC: 4 MMOL/L — SIGNIFICANT CHANGE UP (ref 3.5–5.3)
POTASSIUM SERPL-SCNC: 4 MMOL/L — SIGNIFICANT CHANGE UP (ref 3.5–5.3)
PROT SERPL-MCNC: 7.4 GM/DL — SIGNIFICANT CHANGE UP (ref 6–8.3)
PROT UR-MCNC: 30 MG/DL
PROTHROM AB SERPL-ACNC: 12.2 SEC — SIGNIFICANT CHANGE UP (ref 10–12.9)
RBC # BLD: 4.23 M/UL — SIGNIFICANT CHANGE UP (ref 3.8–5.2)
RBC # FLD: 14 % — SIGNIFICANT CHANGE UP (ref 10.3–14.5)
RBC CASTS # UR COMP ASSIST: SIGNIFICANT CHANGE UP /HPF (ref 0–4)
SODIUM SERPL-SCNC: 140 MMOL/L — SIGNIFICANT CHANGE UP (ref 135–145)
SP GR SPEC: 1.02 — SIGNIFICANT CHANGE UP (ref 1.01–1.02)
UROBILINOGEN FLD QL: 1 MG/DL
WBC # BLD: 5.91 K/UL — SIGNIFICANT CHANGE UP (ref 3.8–10.5)
WBC # FLD AUTO: 5.91 K/UL — SIGNIFICANT CHANGE UP (ref 3.8–10.5)
WBC UR QL: SIGNIFICANT CHANGE UP

## 2019-06-15 PROCEDURE — 99285 EMERGENCY DEPT VISIT HI MDM: CPT | Mod: 25

## 2019-06-15 PROCEDURE — 70450 CT HEAD/BRAIN W/O DYE: CPT | Mod: 26

## 2019-06-15 PROCEDURE — 71275 CT ANGIOGRAPHY CHEST: CPT | Mod: 26

## 2019-06-15 PROCEDURE — 74174 CTA ABD&PLVS W/CONTRAST: CPT | Mod: 26

## 2019-06-15 RX ORDER — DOCUSATE SODIUM 100 MG
100 CAPSULE ORAL
Refills: 0 | Status: DISCONTINUED | OUTPATIENT
Start: 2019-06-15 | End: 2019-06-19

## 2019-06-15 RX ORDER — QUETIAPINE FUMARATE 200 MG/1
50 TABLET, FILM COATED ORAL AT BEDTIME
Refills: 0 | Status: DISCONTINUED | OUTPATIENT
Start: 2019-06-15 | End: 2019-06-19

## 2019-06-15 RX ORDER — ASPIRIN/CALCIUM CARB/MAGNESIUM 324 MG
81 TABLET ORAL DAILY
Refills: 0 | Status: DISCONTINUED | OUTPATIENT
Start: 2019-06-15 | End: 2019-06-19

## 2019-06-15 RX ORDER — METOPROLOL TARTRATE 50 MG
25 TABLET ORAL DAILY
Refills: 0 | Status: DISCONTINUED | OUTPATIENT
Start: 2019-06-15 | End: 2019-06-19

## 2019-06-15 RX ORDER — SIMVASTATIN 20 MG/1
20 TABLET, FILM COATED ORAL AT BEDTIME
Refills: 0 | Status: DISCONTINUED | OUTPATIENT
Start: 2019-06-15 | End: 2019-06-19

## 2019-06-15 RX ORDER — OXYCODONE AND ACETAMINOPHEN 5; 325 MG/1; MG/1
1 TABLET ORAL EVERY 6 HOURS
Refills: 0 | Status: DISCONTINUED | OUTPATIENT
Start: 2019-06-15 | End: 2019-06-19

## 2019-06-15 RX ORDER — LEVOTHYROXINE SODIUM 125 MCG
50 TABLET ORAL DAILY
Refills: 0 | Status: DISCONTINUED | OUTPATIENT
Start: 2019-06-15 | End: 2019-06-19

## 2019-06-15 RX ORDER — OXYCODONE AND ACETAMINOPHEN 5; 325 MG/1; MG/1
1 TABLET ORAL ONCE
Refills: 0 | Status: DISCONTINUED | OUTPATIENT
Start: 2019-06-15 | End: 2019-06-15

## 2019-06-15 RX ORDER — AMIODARONE HYDROCHLORIDE 400 MG/1
200 TABLET ORAL DAILY
Refills: 0 | Status: DISCONTINUED | OUTPATIENT
Start: 2019-06-15 | End: 2019-06-18

## 2019-06-15 RX ORDER — ACETAMINOPHEN 500 MG
650 TABLET ORAL EVERY 6 HOURS
Refills: 0 | Status: DISCONTINUED | OUTPATIENT
Start: 2019-06-15 | End: 2019-06-19

## 2019-06-15 RX ORDER — PANTOPRAZOLE SODIUM 20 MG/1
40 TABLET, DELAYED RELEASE ORAL
Refills: 0 | Status: DISCONTINUED | OUTPATIENT
Start: 2019-06-15 | End: 2019-06-19

## 2019-06-15 RX ADMIN — QUETIAPINE FUMARATE 50 MILLIGRAM(S): 200 TABLET, FILM COATED ORAL at 22:49

## 2019-06-15 RX ADMIN — OXYCODONE AND ACETAMINOPHEN 1 TABLET(S): 5; 325 TABLET ORAL at 23:59

## 2019-06-15 RX ADMIN — OXYCODONE AND ACETAMINOPHEN 1 TABLET(S): 5; 325 TABLET ORAL at 04:44

## 2019-06-15 RX ADMIN — PANTOPRAZOLE SODIUM 40 MILLIGRAM(S): 20 TABLET, DELAYED RELEASE ORAL at 17:19

## 2019-06-15 RX ADMIN — AMIODARONE HYDROCHLORIDE 200 MILLIGRAM(S): 400 TABLET ORAL at 17:18

## 2019-06-15 RX ADMIN — SIMVASTATIN 20 MILLIGRAM(S): 20 TABLET, FILM COATED ORAL at 22:49

## 2019-06-15 RX ADMIN — OXYCODONE AND ACETAMINOPHEN 1 TABLET(S): 5; 325 TABLET ORAL at 23:29

## 2019-06-15 RX ADMIN — Medication 81 MILLIGRAM(S): at 17:19

## 2019-06-15 RX ADMIN — Medication 100 MILLIGRAM(S): at 17:19
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
## 2019-06-15 NOTE — ED PROVIDER NOTE - PHYSICAL EXAMINATION
Gen: Alert, c/o back pain  Head: NC, AT, EOMI, normal lids/conjunctiva  ENT: normal hearing, patent oropharynx, MMM  Neck: supple, no tenderness/meningismus, FROM, Trachea midline  Pulm: Bilateral clear BS, normal resp effort, no wheeze/stridor/retractions  CV: RRR, no M/R/G, +dist pulses  Abd: soft, NT/ND, +BS, no guarding/rebound tenderness  Mskel: no edema/erythema/cyanosis, sensation intact, + R CVAT  Skin: no rash  Neuro:  no gross sensory/motor deficits

## 2019-06-15 NOTE — H&P ADULT - NSICDXPASTSURGICALHX_GEN_ALL_CORE_FT
PAST SURGICAL HISTORY:  H/O cardiac pacemaker [employer] placed 12/10    S/P Colonoscopic Polypectomy 15 years ago per pt    S/P TKR (total knee replacement) Right TKR in 5/16

## 2019-06-15 NOTE — H&P ADULT - NSHPLABSRESULTS_GEN_ALL_CORE
13.8   5.91  )-----------( 140      ( 15 Maximo 2019 05:41 )             42.1     -15    140  |  105  |  10  ----------------------------<  95  4.0   |  28  |  0.53    Ca    9.0      15 Maximo 2019 05:41    TPro  7.4  /  Alb  3.4  /  TBili  2.0<H>  /  DBili  x   /  AST  16  /  ALT  14  /  AlkPhos  73  06-15    PT/INR - ( 15 Maximo 2019 05:41 )   PT: 12.2 sec;   INR: 1.09 ratio         PTT - ( 15 Maximo 2019 05:41 )  PTT:33.4 sec  Urinalysis Basic - ( 15 Maximo 2019 10:10 )    Color: Yellow / Appearance: Clear / S.020 / pH: x  Gluc: x / Ketone: Small  / Bili: Negative / Urobili: 1 mg/dL   Blood: x / Protein: 30 mg/dL / Nitrite: Negative   Leuk Esterase: Trace / RBC: 0-2 /HPF / WBC 3-5   Sq Epi: x / Non Sq Epi: Few / Bacteria: Many

## 2019-06-15 NOTE — ED ADULT NURSE REASSESSMENT NOTE - NS ED NURSE REASSESS COMMENT FT1
cecilia Wong consulted, awaiting PT eval as per MD
Pt is awake and pending CT scan and urine sample is pending

## 2019-06-15 NOTE — ED ADULT TRIAGE NOTE - CHIEF COMPLAINT QUOTE
patient reports generalized back pain today, reports falling last week. seen last night at [facility] for flank pain

## 2019-06-15 NOTE — ED PROVIDER NOTE - CARE PLAN
Principal Discharge DX:	Back pain Principal Discharge DX:	Back pain  Secondary Diagnosis:	Unable to ambulate

## 2019-06-15 NOTE — H&P ADULT - HISTORY OF PRESENT ILLNESS
89yo F w PMH of Afib, chronic back pain, OA, herpes zoster, HTN, HL, hypothyroidism, multiple chronic lumbar and thoracic vertebral fractures presents to ED c/o R sided back pain.

## 2019-06-15 NOTE — ED ADULT NURSE NOTE - CHIEF COMPLAINT QUOTE
patient reports generalized back pain today, reports falling last week. seen last night at [facility] for flank pain

## 2019-06-15 NOTE — ED ADULT NURSE NOTE - NSIMPLEMENTINTERV_GEN_ALL_ED
Implemented All Universal Safety Interventions:  [place] to call system. Call bell, personal items and telephone within reach. Instruct patient to call for assistance. Room bathroom lighting operational. Non-slip footwear when patient is off stretcher. Physically safe environment: no spills, clutter or unnecessary equipment. Stretcher in lowest position, wheels locked, appropriate side rails in place.

## 2019-06-15 NOTE — H&P ADULT - NSICDXPASTMEDICALHX_GEN_ALL_CORE_FT
PAST MEDICAL HISTORY:  Atrial fibrillation     Chronic Back Pain     Generalized Osteoarthritis     Herpes zoster without complication 2015    HTN (hypertension)     Hypercholesteremia     Hypothyroidism     Lumbar Vertebral Fracture

## 2019-06-15 NOTE — ED PROVIDER NOTE - CLINICAL SUMMARY MEDICAL DECISION MAKING FREE TEXT BOX
Pt w back pain, pending CTA.  Pt also seems confused, continually asking about the whereabouts of person who floated into ceiling.  CT head neg for acute pathology, pending UA to r/o UTI.  Patient care transitioned to incoming team.  All decisions regarding the progression of care will be made at their discretion.

## 2019-06-16 DIAGNOSIS — S22.000A WEDGE COMPRESSION FRACTURE OF UNSPECIFIED THORACIC VERTEBRA, INITIAL ENCOUNTER FOR CLOSED FRACTURE: ICD-10-CM

## 2019-06-16 LAB
CULTURE RESULTS: SIGNIFICANT CHANGE UP
SPECIMEN SOURCE: SIGNIFICANT CHANGE UP

## 2019-06-16 RX ADMIN — OXYCODONE AND ACETAMINOPHEN 1 TABLET(S): 5; 325 TABLET ORAL at 23:05

## 2019-06-16 RX ADMIN — PANTOPRAZOLE SODIUM 40 MILLIGRAM(S): 20 TABLET, DELAYED RELEASE ORAL at 06:42

## 2019-06-16 RX ADMIN — OXYCODONE AND ACETAMINOPHEN 1 TABLET(S): 5; 325 TABLET ORAL at 22:45

## 2019-06-16 RX ADMIN — AMIODARONE HYDROCHLORIDE 200 MILLIGRAM(S): 400 TABLET ORAL at 06:42

## 2019-06-16 RX ADMIN — QUETIAPINE FUMARATE 50 MILLIGRAM(S): 200 TABLET, FILM COATED ORAL at 22:45

## 2019-06-16 RX ADMIN — Medication 100 MILLIGRAM(S): at 06:43

## 2019-06-16 RX ADMIN — Medication 25 MILLIGRAM(S): at 06:43

## 2019-06-16 RX ADMIN — Medication 50 MICROGRAM(S): at 06:43

## 2019-06-16 RX ADMIN — OXYCODONE AND ACETAMINOPHEN 1 TABLET(S): 5; 325 TABLET ORAL at 16:41

## 2019-06-16 RX ADMIN — OXYCODONE AND ACETAMINOPHEN 1 TABLET(S): 5; 325 TABLET ORAL at 17:41

## 2019-06-16 RX ADMIN — Medication 100 MILLIGRAM(S): at 16:41

## 2019-06-16 RX ADMIN — Medication 81 MILLIGRAM(S): at 16:41

## 2019-06-16 RX ADMIN — SIMVASTATIN 20 MILLIGRAM(S): 20 TABLET, FILM COATED ORAL at 22:45

## 2019-06-17 ENCOUNTER — TRANSCRIPTION ENCOUNTER (OUTPATIENT)
Age: 84
End: 2019-06-17

## 2019-06-17 LAB
ANION GAP SERPL CALC-SCNC: 10 MMOL/L — SIGNIFICANT CHANGE UP (ref 5–17)
BUN SERPL-MCNC: 18 MG/DL — SIGNIFICANT CHANGE UP (ref 7–23)
CALCIUM SERPL-MCNC: 8.3 MG/DL — LOW (ref 8.5–10.1)
CHLORIDE SERPL-SCNC: 106 MMOL/L — SIGNIFICANT CHANGE UP (ref 96–108)
CO2 SERPL-SCNC: 27 MMOL/L — SIGNIFICANT CHANGE UP (ref 22–31)
CREAT SERPL-MCNC: 0.51 MG/DL — SIGNIFICANT CHANGE UP (ref 0.5–1.3)
GLUCOSE SERPL-MCNC: 86 MG/DL — SIGNIFICANT CHANGE UP (ref 70–99)
HCT VFR BLD CALC: 38 % — SIGNIFICANT CHANGE UP (ref 34.5–45)
HGB BLD-MCNC: 12.3 G/DL — SIGNIFICANT CHANGE UP (ref 11.5–15.5)
MCHC RBC-ENTMCNC: 32.1 PG — SIGNIFICANT CHANGE UP (ref 27–34)
MCHC RBC-ENTMCNC: 32.4 GM/DL — SIGNIFICANT CHANGE UP (ref 32–36)
MCV RBC AUTO: 99.2 FL — SIGNIFICANT CHANGE UP (ref 80–100)
NRBC # BLD: 0 /100 WBCS — SIGNIFICANT CHANGE UP (ref 0–0)
PLATELET # BLD AUTO: 137 K/UL — LOW (ref 150–400)
POTASSIUM SERPL-MCNC: 3.4 MMOL/L — LOW (ref 3.5–5.3)
POTASSIUM SERPL-SCNC: 3.4 MMOL/L — LOW (ref 3.5–5.3)
RBC # BLD: 3.83 M/UL — SIGNIFICANT CHANGE UP (ref 3.8–5.2)
RBC # FLD: 14.3 % — SIGNIFICANT CHANGE UP (ref 10.3–14.5)
SODIUM SERPL-SCNC: 143 MMOL/L — SIGNIFICANT CHANGE UP (ref 135–145)
WBC # BLD: 4.14 K/UL — SIGNIFICANT CHANGE UP (ref 3.8–10.5)
WBC # FLD AUTO: 4.14 K/UL — SIGNIFICANT CHANGE UP (ref 3.8–10.5)

## 2019-06-17 RX ORDER — QUETIAPINE FUMARATE 200 MG/1
0.5 TABLET, FILM COATED ORAL
Qty: 0 | Refills: 0 | DISCHARGE

## 2019-06-17 RX ORDER — AMIODARONE HYDROCHLORIDE 400 MG/1
1 TABLET ORAL
Qty: 0 | Refills: 0 | DISCHARGE
Start: 2019-06-17

## 2019-06-17 RX ORDER — OXYCODONE HYDROCHLORIDE 5 MG/1
1 TABLET ORAL
Qty: 0 | Refills: 0 | DISCHARGE
Start: 2019-06-17

## 2019-06-17 RX ORDER — METOPROLOL TARTRATE 50 MG
1 TABLET ORAL
Qty: 0 | Refills: 0 | DISCHARGE
Start: 2019-06-17

## 2019-06-17 RX ORDER — SIMVASTATIN 20 MG/1
1 TABLET, FILM COATED ORAL
Qty: 0 | Refills: 0 | DISCHARGE
Start: 2019-06-17

## 2019-06-17 RX ORDER — ASPIRIN/CALCIUM CARB/MAGNESIUM 324 MG
1 TABLET ORAL
Qty: 0 | Refills: 0 | DISCHARGE
Start: 2019-06-17

## 2019-06-17 RX ORDER — DOCUSATE SODIUM 100 MG
1 CAPSULE ORAL
Qty: 0 | Refills: 0 | DISCHARGE
Start: 2019-06-17

## 2019-06-17 RX ORDER — LEVOTHYROXINE SODIUM 125 MCG
1 TABLET ORAL
Qty: 0 | Refills: 0 | DISCHARGE
Start: 2019-06-17

## 2019-06-17 RX ORDER — PANTOPRAZOLE SODIUM 20 MG/1
1 TABLET, DELAYED RELEASE ORAL
Qty: 0 | Refills: 0 | DISCHARGE
Start: 2019-06-17

## 2019-06-17 RX ORDER — OXYCODONE HYDROCHLORIDE 5 MG/1
10 TABLET ORAL EVERY 12 HOURS
Refills: 0 | Status: DISCONTINUED | OUTPATIENT
Start: 2019-06-17 | End: 2019-06-18

## 2019-06-17 RX ORDER — QUETIAPINE FUMARATE 200 MG/1
1 TABLET, FILM COATED ORAL
Qty: 0 | Refills: 0 | DISCHARGE
Start: 2019-06-17

## 2019-06-17 RX ADMIN — AMIODARONE HYDROCHLORIDE 200 MILLIGRAM(S): 400 TABLET ORAL at 05:04

## 2019-06-17 RX ADMIN — Medication 25 MILLIGRAM(S): at 05:04

## 2019-06-17 RX ADMIN — OXYCODONE AND ACETAMINOPHEN 1 TABLET(S): 5; 325 TABLET ORAL at 11:25

## 2019-06-17 RX ADMIN — Medication 81 MILLIGRAM(S): at 11:26

## 2019-06-17 RX ADMIN — OXYCODONE HYDROCHLORIDE 10 MILLIGRAM(S): 5 TABLET ORAL at 18:41

## 2019-06-17 RX ADMIN — Medication 100 MILLIGRAM(S): at 17:39

## 2019-06-17 RX ADMIN — SIMVASTATIN 20 MILLIGRAM(S): 20 TABLET, FILM COATED ORAL at 22:41

## 2019-06-17 RX ADMIN — OXYCODONE HYDROCHLORIDE 10 MILLIGRAM(S): 5 TABLET ORAL at 17:39

## 2019-06-17 RX ADMIN — QUETIAPINE FUMARATE 50 MILLIGRAM(S): 200 TABLET, FILM COATED ORAL at 22:41

## 2019-06-17 RX ADMIN — OXYCODONE AND ACETAMINOPHEN 1 TABLET(S): 5; 325 TABLET ORAL at 05:04

## 2019-06-17 RX ADMIN — PANTOPRAZOLE SODIUM 40 MILLIGRAM(S): 20 TABLET, DELAYED RELEASE ORAL at 07:02

## 2019-06-17 RX ADMIN — Medication 100 MILLIGRAM(S): at 05:04

## 2019-06-17 RX ADMIN — OXYCODONE AND ACETAMINOPHEN 1 TABLET(S): 5; 325 TABLET ORAL at 22:52

## 2019-06-17 RX ADMIN — OXYCODONE AND ACETAMINOPHEN 1 TABLET(S): 5; 325 TABLET ORAL at 12:25

## 2019-06-17 RX ADMIN — Medication 50 MICROGRAM(S): at 05:04

## 2019-06-17 NOTE — DISCHARGE NOTE PROVIDER - CARE PROVIDER_API CALL
Guido Sanderson (DO)  Internal Medicine  [address] [place] [address]  Phone: [phone]  Fax: [phone]  Follow Up Time: 1 week    ortho at rehab,   Phone: (   )    -  Fax: (   )    -  Follow Up Time: 1-3 days

## 2019-06-17 NOTE — DISCHARGE NOTE PROVIDER - NSDCCONDITION_GEN_ALL_CORE
Stable
69y/o male with left testicular abscess  -Keep NPO for exploration, possible orchiectomy  -F/u cultures  -Continue abx  -Pain control

## 2019-06-17 NOTE — PHYSICAL THERAPY INITIAL EVALUATION ADULT - FOLLOWS COMMANDS/ANSWERS QUESTIONS, REHAB EVAL
able to follow single-step instructions/75% of the time/50% of the time/needs encouragement to participate

## 2019-06-17 NOTE — DIETITIAN INITIAL EVALUATION ADULT. - PERTINENT MEDS FT
MEDICATIONS  (STANDING):  amiodarone    Tablet 200 milliGRAM(s) Oral daily  aspirin enteric coated 81 milliGRAM(s) Oral daily  docusate sodium 100 milliGRAM(s) Oral two times a day  levothyroxine 50 MICROGram(s) Oral daily  metoprolol succinate ER 25 milliGRAM(s) Oral daily  oxyCODONE  ER Tablet 10 milliGRAM(s) Oral every 12 hours  pantoprazole    Tablet 40 milliGRAM(s) Oral before breakfast  QUEtiapine 50 milliGRAM(s) Oral at bedtime  simvastatin 20 milliGRAM(s) Oral at bedtime    MEDICATIONS  (PRN):  acetaminophen   Tablet .. 650 milliGRAM(s) Oral every 6 hours PRN Temp greater or equal to 38C (100.4F), Mild Pain (1 - 3)  oxyCODONE    5 mG/acetaminophen 325 mG 1 Tablet(s) Oral every 6 hours PRN Severe Pain (7 - 10)

## 2019-06-17 NOTE — DISCHARGE NOTE PROVIDER - NSDCCPCAREPLAN_GEN_ALL_CORE_FT
PRINCIPAL DISCHARGE DIAGNOSIS  Diagnosis: Back pain  Assessment and Plan of Treatment: Pain management      SECONDARY DISCHARGE DIAGNOSES  Diagnosis: Unable to ambulate  Assessment and Plan of Treatment: rehab

## 2019-06-17 NOTE — PHYSICAL THERAPY INITIAL EVALUATION ADULT - GENERAL OBSERVATIONS, REHAB EVAL
Pt encountered supine on stretcher in [facility]. Pt is confused and speaking in english and creole on and off. Pt very resistant and stated she does not want to get up. RN at bedside and assisted on educating pt on importance of mobility and role of PT. Pt states we have to be patient and will need 20 minutes and maybe she will be ready. PT educated pt on log rolling to assist with back pain during bed mobility and pt refused to move and started screaming she is in pain now to breathe because I am trying to move her. MD Sanchez and RIOS Wong also aware of pt not willing to participate in evaluation at this time. Will continue to follow up
Pt found alert, needs encouragement and reassurance to participate, c/o pain in the whole back mostly in the mid to low back, on room air.

## 2019-06-17 NOTE — PHYSICAL THERAPY INITIAL EVALUATION ADULT - BALANCE TRAINING, PT EVAL
Improve sitting, transfers, standing and ambulation balance to good with  assistive device and prevent falls.

## 2019-06-17 NOTE — PHYSICAL THERAPY INITIAL EVALUATION ADULT - DIAGNOSIS, PT EVAL
Pain increases with movt and exertions, unsteady ambulation balance, poor standing and ambulation tolerance, fall risk.

## 2019-06-17 NOTE — PHYSICAL THERAPY INITIAL EVALUATION ADULT - BED MOBILITY TRAINING, PT EVAL
Independent in bed mobility- supine to sit and vice versa, rolling side to side observing proper body mechanics, positioning, alignment and precautions.

## 2019-06-17 NOTE — DIETITIAN INITIAL EVALUATION ADULT. - PERTINENT LABORATORY DATA
06-17 Na143 mmol/L Glu 86 mg/dL K+ 3.4 mmol/L<L> Cr  0.51 mg/dL BUN 18 mg/dL 06-15 Alb 3.4 g/dL06-15 ALT 14 U/L AST 16 U/L Alkaline Phosphatase 73 U/L

## 2019-06-17 NOTE — PHYSICAL THERAPY INITIAL EVALUATION ADULT - GAIT TRAINING, PT EVAL
Independent in ambulation with use of rolling walker device up to 200 feet or more observing proper gait pattern, posture and use of walking device safely.

## 2019-06-17 NOTE — DIETITIAN INITIAL EVALUATION ADULT. - ETIOLOGY
Inadequate energy and protein intake/ increased energy and protein needs related to chronic back pain & sacral stage 2

## 2019-06-17 NOTE — DIETITIAN INITIAL EVALUATION ADULT. - PHYSICAL APPEARANCE
BMI = 18.9, no edema noted ; Nutrition focused physical exam conducted ;   Subcutaneous fat loss: [severe ] Orbital fat pads region, [severe ]Buccal fat region, [severe ]Triceps region,  [severe ]Ribs region.  Muscle wasting: [severe ]Temples region, [severe ]Clavicle region, [severe ]Shoulder region, [severe ]Scapula region, [severe ]Interosseous region,  [severe ]thigh region, [severe ]Calf region/underweight

## 2019-06-17 NOTE — DISCHARGE NOTE PROVIDER - HOSPITAL COURSE
89y old  Female who presents with a chief complaint of backache a/w acute on Chronic low back pain, with sciatica presence pain control with meds..         Unable to ambulate.  Plan: dc to rehab.         Compression fracture of body of thoracic vertebra.  Plan: pain control.         d/c to rehab

## 2019-06-17 NOTE — PHYSICAL THERAPY INITIAL EVALUATION ADULT - PRECAUTIONS/LIMITATIONS, REHAB EVAL
unsteady gait, fall risk, pain increases upon exertion and activities- sitting , standing and ambulation.

## 2019-06-17 NOTE — PHYSICAL THERAPY INITIAL EVALUATION ADULT - TRANSFER TRAINING, PT EVAL
Independent in transfers bed to chair and vice versa, toilet transfers and maneuvers using  rolling walker device, observing proper body  mechanics, positioning and precautions.

## 2019-06-17 NOTE — DISCHARGE NOTE PROVIDER - PROVIDER TOKENS
PROVIDER:[TOKEN:[[id number]],FOLLOWUP:[1 week]],FREE:[LAST:[ortho at rehab],PHONE:[(   )    -],FAX:[(   )    -],FOLLOWUP:[1-3 days]]

## 2019-06-17 NOTE — PHYSICAL THERAPY INITIAL EVALUATION ADULT - STRENGTHENING, PT EVAL
Improve strength in the UE and LE to 5/5  and be able to perform functional tasks-bed mobility, sitting, standing, transfers and ambulate in a safe manner using assistive device if needed and prevent falls.

## 2019-06-17 NOTE — PHYSICAL THERAPY INITIAL EVALUATION ADULT - PERTINENT HX OF CURRENT PROBLEM, REHAB EVAL
Pt stated she is having back pain for days that she cannot walk, CT 6/15: Multiple level thoraco-lumbar spine compression fracture many of which is chronic and age indeterminate.

## 2019-06-17 NOTE — PHYSICAL THERAPY INITIAL EVALUATION ADULT - LIVES WITH, PROFILE
As per patient she lives with children pvt house, has steps to enter but could not remember how many.

## 2019-06-17 NOTE — CHART NOTE - NSCHARTNOTEFT_GEN_A_CORE
Upon Nutritional Assessment by the Registered Dietitian your patient was determined to meet criteria / has evidence of the following diagnosis/diagnoses:          [ ]  Mild Protein Calorie Malnutrition        [ ]  Moderate Protein Calorie Malnutrition        [x ] Severe Protein Calorie Malnutrition        [ ] Unspecified Protein Calorie Malnutrition        [x ] Underweight / BMI <19        [ ] Morbid Obesity / BMI > 40      Findings as based on:  •  Comprehensive nutrition assessment and consultation  •  Calorie counts (nutrient intake analysis)  •  Food acceptance and intake status from observations by staff  •  Follow up  •  Patient education  •  Intervention secondary to interdisciplinary rounds  •   concerns      Treatment:    The following diet has been recommended: Regular w/ ensure enlive 8 oz twice per day (700 reyna & 40 g pro)       PROVIDER Section:     By signing this assessment you are acknowledging and agree with the diagnosis/diagnoses assigned by the Registered Dietitian    Comments:

## 2019-06-18 RX ORDER — MORPHINE SULFATE 50 MG/1
15 CAPSULE, EXTENDED RELEASE ORAL EVERY 12 HOURS
Refills: 0 | Status: DISCONTINUED | OUTPATIENT
Start: 2019-06-18 | End: 2019-06-19

## 2019-06-18 RX ORDER — MORPHINE SULFATE 50 MG/1
1 CAPSULE, EXTENDED RELEASE ORAL
Qty: 0 | Refills: 0 | DISCHARGE
Start: 2019-06-18

## 2019-06-18 RX ADMIN — SIMVASTATIN 20 MILLIGRAM(S): 20 TABLET, FILM COATED ORAL at 21:23

## 2019-06-18 RX ADMIN — OXYCODONE AND ACETAMINOPHEN 1 TABLET(S): 5; 325 TABLET ORAL at 15:04

## 2019-06-18 RX ADMIN — MORPHINE SULFATE 15 MILLIGRAM(S): 50 CAPSULE, EXTENDED RELEASE ORAL at 17:54

## 2019-06-18 RX ADMIN — OXYCODONE AND ACETAMINOPHEN 1 TABLET(S): 5; 325 TABLET ORAL at 21:23

## 2019-06-18 RX ADMIN — QUETIAPINE FUMARATE 50 MILLIGRAM(S): 200 TABLET, FILM COATED ORAL at 21:23

## 2019-06-18 RX ADMIN — Medication 100 MILLIGRAM(S): at 06:14

## 2019-06-18 RX ADMIN — Medication 50 MICROGRAM(S): at 06:15

## 2019-06-18 RX ADMIN — OXYCODONE AND ACETAMINOPHEN 1 TABLET(S): 5; 325 TABLET ORAL at 09:20

## 2019-06-18 RX ADMIN — OXYCODONE AND ACETAMINOPHEN 1 TABLET(S): 5; 325 TABLET ORAL at 08:50

## 2019-06-18 RX ADMIN — OXYCODONE HYDROCHLORIDE 10 MILLIGRAM(S): 5 TABLET ORAL at 06:13

## 2019-06-18 RX ADMIN — AMIODARONE HYDROCHLORIDE 200 MILLIGRAM(S): 400 TABLET ORAL at 06:14

## 2019-06-18 RX ADMIN — MORPHINE SULFATE 15 MILLIGRAM(S): 50 CAPSULE, EXTENDED RELEASE ORAL at 18:25

## 2019-06-18 RX ADMIN — OXYCODONE AND ACETAMINOPHEN 1 TABLET(S): 5; 325 TABLET ORAL at 15:36

## 2019-06-18 RX ADMIN — OXYCODONE AND ACETAMINOPHEN 1 TABLET(S): 5; 325 TABLET ORAL at 22:23

## 2019-06-18 RX ADMIN — PANTOPRAZOLE SODIUM 40 MILLIGRAM(S): 20 TABLET, DELAYED RELEASE ORAL at 06:15

## 2019-06-18 RX ADMIN — Medication 100 MILLIGRAM(S): at 17:54

## 2019-06-18 RX ADMIN — Medication 81 MILLIGRAM(S): at 12:27

## 2019-06-18 RX ADMIN — OXYCODONE AND ACETAMINOPHEN 1 TABLET(S): 5; 325 TABLET ORAL at 00:03

## 2019-06-19 ENCOUNTER — TRANSCRIPTION ENCOUNTER (OUTPATIENT)
Age: 84
End: 2019-06-19

## 2019-06-19 VITALS
HEART RATE: 65 BPM | DIASTOLIC BLOOD PRESSURE: 77 MMHG | RESPIRATION RATE: 18 BRPM | TEMPERATURE: 98 F | SYSTOLIC BLOOD PRESSURE: 131 MMHG | OXYGEN SATURATION: 95 %

## 2019-06-19 RX ADMIN — Medication 100 MILLIGRAM(S): at 06:39

## 2019-06-19 RX ADMIN — PANTOPRAZOLE SODIUM 40 MILLIGRAM(S): 20 TABLET, DELAYED RELEASE ORAL at 08:13

## 2019-06-19 RX ADMIN — Medication 25 MILLIGRAM(S): at 06:39

## 2019-06-19 RX ADMIN — Medication 50 MICROGRAM(S): at 06:39

## 2019-06-19 RX ADMIN — Medication 650 MILLIGRAM(S): at 10:40

## 2019-06-19 RX ADMIN — MORPHINE SULFATE 15 MILLIGRAM(S): 50 CAPSULE, EXTENDED RELEASE ORAL at 17:48

## 2019-06-19 RX ADMIN — Medication 650 MILLIGRAM(S): at 10:00

## 2019-06-19 RX ADMIN — Medication 81 MILLIGRAM(S): at 12:17

## 2019-06-19 RX ADMIN — Medication 100 MILLIGRAM(S): at 17:48

## 2019-06-19 RX ADMIN — MORPHINE SULFATE 15 MILLIGRAM(S): 50 CAPSULE, EXTENDED RELEASE ORAL at 06:39

## 2019-06-19 RX ADMIN — MORPHINE SULFATE 15 MILLIGRAM(S): 50 CAPSULE, EXTENDED RELEASE ORAL at 07:49

## 2019-06-19 RX ADMIN — MORPHINE SULFATE 15 MILLIGRAM(S): 50 CAPSULE, EXTENDED RELEASE ORAL at 18:28

## 2019-06-19 NOTE — DISCHARGE NOTE NURSING/CASE MANAGEMENT/SOCIAL WORK - NSDCDPATPORTLINK_GEN_ALL_CORE
You can access the [facility], offered by [facility], by registering with the following website: [email or web address]

## 2019-06-19 NOTE — PROGRESS NOTE ADULT - SUBJECTIVE AND OBJECTIVE BOX
Patient is a 89y old  Female who presents with a chief complaint of backache (17 Jun 2019 11:32)      INTERVAL HPI/OVERNIGHT EVENTS:  pt feeling better   MEDICATIONS  (STANDING):  aspirin enteric coated 81 milliGRAM(s) Oral daily  docusate sodium 100 milliGRAM(s) Oral two times a day  levothyroxine 50 MICROGram(s) Oral daily  metoprolol succinate ER 25 milliGRAM(s) Oral daily  oxyCODONE  ER Tablet 10 milliGRAM(s) Oral every 12 hours  pantoprazole    Tablet 40 milliGRAM(s) Oral before breakfast  QUEtiapine 50 milliGRAM(s) Oral at bedtime  simvastatin 20 milliGRAM(s) Oral at bedtime    MEDICATIONS  (PRN):  acetaminophen   Tablet .. 650 milliGRAM(s) Oral every 6 hours PRN Temp greater or equal to 38C (100.4F), Mild Pain (1 - 3)  oxyCODONE    5 mG/acetaminophen 325 mG 1 Tablet(s) Oral every 6 hours PRN Severe Pain (7 - 10)      Allergies    lidocaine (Urticaria)    Intolerances        REVIEW OF SYSTEMS:  CONSTITUTIONAL: No fever, weight loss, or fatigue  EYES: No eye pain, visual disturbances, or discharge  ENMT:  No difficulty hearing, tinnitus, vertigo; No sinus or throat pain  NECK: No pain or stiffness  BREASTS: No pain, masses, or nipple discharge  RESPIRATORY: No cough, wheezing, chills or hemoptysis; No shortness of breath  CARDIOVASCULAR: No chest pain, palpitations, dizziness, or leg swelling  GASTROINTESTINAL: No abdominal or epigastric pain. No nausea, vomiting, or hematemesis; No diarrhea or constipation. No melena or hematochezia.  GENITOURINARY: No dysuria, frequency, hematuria, or incontinence  NEUROLOGICAL: No headaches, memory loss, loss of strength, numbness, or tremors  SKIN: No itching, burning, rashes, or lesions   LYMPH NODES: No enlarged glands  ENDOCRINE: No heat or cold intolerance; No hair loss  MUSCULOSKELETAL: No joint pain or swelling; No muscle, back, or extremity pain  PSYCHIATRIC: No depression, anxiety, mood swings, or difficulty sleeping  HEME/LYMPH: No easy bruising, or bleeding gums  ALLERGY AND IMMUNOLOGIC: No hives or eczema    Vital Signs Last 24 Hrs  T(C): 36.6 (18 Jun 2019 11:43), Max: 36.9 (17 Jun 2019 17:12)  T(F): 97.8 (18 Jun 2019 11:43), Max: 98.4 (17 Jun 2019 17:12)  HR: 56 (18 Jun 2019 11:43) (50 - 71)  BP: 120/67 (18 Jun 2019 11:43) (101/54 - 124/80)  BP(mean): --  RR: 18 (18 Jun 2019 11:43) (17 - 18)  SpO2: 95% (18 Jun 2019 11:43) (93% - 97%)    PHYSICAL EXAM:  GENERAL: NAD, well-groomed, well-developed  HEAD:  Atraumatic, Normocephalic  EYES: EOMI, PERRLA, conjunctiva and sclera clear  ENMT: No tonsillar erythema, exudates, or enlargement; Moist mucous membranes, Good dentition, No lesions  NECK: Supple, No JVD, Normal thyroid  NERVOUS SYSTEM:  Alert & Oriented X3, Good concentration; Motor Strength 5/5 B/L upper and lower extremities; DTRs 2+ intact and symmetric  CHEST/LUNG: Clear to percussion bilaterally; No rales, rhonchi, wheezing, or rubs  HEART: Regular rate and rhythm; No murmurs, rubs, or gallops  ABDOMEN: Soft, Nontender, Nondistended; Bowel sounds present  EXTREMITIES:  2+ Peripheral Pulses, No clubbing, cyanosis, or edema  LYMPH: No lymphadenopathy noted  SKIN: No rashes or lesions    LABS:                        12.3   4.14  )-----------( 137      ( 17 Jun 2019 06:25 )             38.0     06-17    143  |  106  |  18  ----------------------------<  86  3.4<L>   |  27  |  0.51    Ca    8.3<L>      17 Jun 2019 06:25          CAPILLARY BLOOD GLUCOSE        CULTURES:  Culture Results:   >100,000 CFU/ml Aerococcus species  "Aerococcus spp. are predictably susceptible to penicillin,  ampicillin, tetracycline, and vancomycin.  All isolates are  resistant to sulfonamides" (06-15 @ 12:55)    HEMOGLOBIN A1C:    CHOLESTEROL:        RADIOLOGY & ADDITIONAL TESTS:
Patient is a 89y old  Female who presents with a chief complaint of backache (18 Jun 2019 12:09)      INTERVAL HPI/OVERNIGHT EVENTS:  no complaint  MEDICATIONS  (STANDING):  aspirin enteric coated 81 milliGRAM(s) Oral daily  docusate sodium 100 milliGRAM(s) Oral two times a day  levothyroxine 50 MICROGram(s) Oral daily  metoprolol succinate ER 25 milliGRAM(s) Oral daily  morphine ER Tablet 15 milliGRAM(s) Oral every 12 hours  pantoprazole    Tablet 40 milliGRAM(s) Oral before breakfast  QUEtiapine 50 milliGRAM(s) Oral at bedtime  simvastatin 20 milliGRAM(s) Oral at bedtime    MEDICATIONS  (PRN):  acetaminophen   Tablet .. 650 milliGRAM(s) Oral every 6 hours PRN Temp greater or equal to 38C (100.4F), Mild Pain (1 - 3)  oxyCODONE    5 mG/acetaminophen 325 mG 1 Tablet(s) Oral every 6 hours PRN Severe Pain (7 - 10)      Allergies    lidocaine (Urticaria)    Intolerances        REVIEW OF SYSTEMS:  CONSTITUTIONAL: No fever, weight loss, or fatigue  EYES: No eye pain, visual disturbances, or discharge  ENMT:  No difficulty hearing, tinnitus, vertigo; No sinus or throat pain  NECK: No pain or stiffness  BREASTS: No pain, masses, or nipple discharge  RESPIRATORY: No cough, wheezing, chills or hemoptysis; No shortness of breath  CARDIOVASCULAR: No chest pain, palpitations, dizziness, or leg swelling  GASTROINTESTINAL: No abdominal or epigastric pain. No nausea, vomiting, or hematemesis; No diarrhea or constipation. No melena or hematochezia.  GENITOURINARY: No dysuria, frequency, hematuria, or incontinence  NEUROLOGICAL: No headaches, memory loss, loss of strength, numbness, or tremors  SKIN: No itching, burning, rashes, or lesions   LYMPH NODES: No enlarged glands  ENDOCRINE: No heat or cold intolerance; No hair loss  MUSCULOSKELETAL: No joint pain or swelling; No muscle, back, or extremity pain  PSYCHIATRIC: No depression, anxiety, mood swings, or difficulty sleeping  HEME/LYMPH: No easy bruising, or bleeding gums  ALLERGY AND IMMUNOLOGIC: No hives or eczema    Vital Signs Last 24 Hrs  T(C): 36.9 (19 Jun 2019 05:57), Max: 37.1 (18 Jun 2019 23:42)  T(F): 98.4 (19 Jun 2019 05:57), Max: 98.8 (18 Jun 2019 23:42)  HR: 59 (19 Jun 2019 05:57) (56 - 64)  BP: 150/83 (19 Jun 2019 05:57) (104/64 - 150/83)  BP(mean): --  RR: 18 (19 Jun 2019 05:57) (18 - 20)  SpO2: 98% (19 Jun 2019 05:57) (93% - 98%)    PHYSICAL EXAM:  GENERAL: NAD, well-groomed, well-developed  HEAD:  Atraumatic, Normocephalic  EYES: EOMI, PERRLA, conjunctiva and sclera clear  ENMT: No tonsillar erythema, exudates, or enlargement; Moist mucous membranes, Good dentition, No lesions  NECK: Supple, No JVD, Normal thyroid  NERVOUS SYSTEM:  Alert & Oriented X3, Good concentration; Motor Strength 5/5 B/L upper and lower extremities; DTRs 2+ intact and symmetric  CHEST/LUNG: Clear to percussion bilaterally; No rales, rhonchi, wheezing, or rubs  HEART: Regular rate and rhythm; No murmurs, rubs, or gallops  ABDOMEN: Soft, Nontender, Nondistended; Bowel sounds present  EXTREMITIES:  2+ Peripheral Pulses, No clubbing, cyanosis, or edema  LYMPH: No lymphadenopathy noted  SKIN: No rashes or lesions    LABS:              CAPILLARY BLOOD GLUCOSE        CULTURES:    HEMOGLOBIN A1C:    CHOLESTEROL:        RADIOLOGY & ADDITIONAL TESTS:
Patient is a 89y old  Female who presents with a chief complaint of backache (18 Jun 2019 12:09)      INTERVAL HPI/OVERNIGHT EVENTS:  pt still back pain  MEDICATIONS  (STANDING):  aspirin enteric coated 81 milliGRAM(s) Oral daily  docusate sodium 100 milliGRAM(s) Oral two times a day  levothyroxine 50 MICROGram(s) Oral daily  metoprolol succinate ER 25 milliGRAM(s) Oral daily  oxyCODONE  ER Tablet 10 milliGRAM(s) Oral every 12 hours  pantoprazole    Tablet 40 milliGRAM(s) Oral before breakfast  QUEtiapine 50 milliGRAM(s) Oral at bedtime  simvastatin 20 milliGRAM(s) Oral at bedtime    MEDICATIONS  (PRN):  acetaminophen   Tablet .. 650 milliGRAM(s) Oral every 6 hours PRN Temp greater or equal to 38C (100.4F), Mild Pain (1 - 3)  oxyCODONE    5 mG/acetaminophen 325 mG 1 Tablet(s) Oral every 6 hours PRN Severe Pain (7 - 10)      Allergies    lidocaine (Urticaria)    Intolerances        REVIEW OF SYSTEMS:  CONSTITUTIONAL: No fever, weight loss, or fatigue  EYES: No eye pain, visual disturbances, or discharge  ENMT:  No difficulty hearing, tinnitus, vertigo; No sinus or throat pain  NECK: No pain or stiffness  BREASTS: No pain, masses, or nipple discharge  RESPIRATORY: No cough, wheezing, chills or hemoptysis; No shortness of breath  CARDIOVASCULAR: No chest pain, palpitations, dizziness, or leg swelling  GASTROINTESTINAL: No abdominal or epigastric pain. No nausea, vomiting, or hematemesis; No diarrhea or constipation. No melena or hematochezia.  GENITOURINARY: No dysuria, frequency, hematuria, or incontinence  NEUROLOGICAL: No headaches, memory loss, loss of strength, numbness, or tremors  SKIN: No itching, burning, rashes, or lesions   LYMPH NODES: No enlarged glands  ENDOCRINE: No heat or cold intolerance; No hair loss  MUSCULOSKELETAL: No joint pain or swelling; No muscle, back, or extremity pain  PSYCHIATRIC: No depression, anxiety, mood swings, or difficulty sleeping  HEME/LYMPH: No easy bruising, or bleeding gums  ALLERGY AND IMMUNOLOGIC: No hives or eczema    Vital Signs Last 24 Hrs  T(C): 36.6 (18 Jun 2019 11:43), Max: 36.9 (17 Jun 2019 17:12)  T(F): 97.8 (18 Jun 2019 11:43), Max: 98.4 (17 Jun 2019 17:12)  HR: 56 (18 Jun 2019 11:43) (50 - 71)  BP: 120/67 (18 Jun 2019 11:43) (101/54 - 124/80)  BP(mean): --  RR: 18 (18 Jun 2019 11:43) (17 - 18)  SpO2: 95% (18 Jun 2019 11:43) (93% - 97%)    PHYSICAL EXAM:  GENERAL: NAD, well-groomed, well-developed  HEAD:  Atraumatic, Normocephalic  EYES: EOMI, PERRLA, conjunctiva and sclera clear  ENMT: No tonsillar erythema, exudates, or enlargement; Moist mucous membranes, Good dentition, No lesions  NECK: Supple, No JVD, Normal thyroid  NERVOUS SYSTEM:  Alert & Oriented X3, Good concentration; Motor Strength 5/5 B/L upper and lower extremities; DTRs 2+ intact and symmetric  CHEST/LUNG: Clear to percussion bilaterally; No rales, rhonchi, wheezing, or rubs  HEART: Regular rate and rhythm; No murmurs, rubs, or gallops  ABDOMEN: Soft, Nontender, Nondistended; Bowel sounds present  EXTREMITIES:  2+ Peripheral Pulses, No clubbing, cyanosis, or edema  LYMPH: No lymphadenopathy noted  SKIN: No rashes or lesions    LABS:                        12.3   4.14  )-----------( 137      ( 17 Jun 2019 06:25 )             38.0     06-17    143  |  106  |  18  ----------------------------<  86  3.4<L>   |  27  |  0.51    Ca    8.3<L>      17 Jun 2019 06:25          CAPILLARY BLOOD GLUCOSE        CULTURES:  Culture Results:   >100,000 CFU/ml Aerococcus species  "Aerococcus spp. are predictably susceptible to penicillin,  ampicillin, tetracycline, and vancomycin.  All isolates are  resistant to sulfonamides" (06-15 @ 12:55)    HEMOGLOBIN A1C:    CHOLESTEROL:        RADIOLOGY & ADDITIONAL TESTS:
Patient is a 89y old  Female who presents with a chief complaint of backache (15 Maximo 2019 13:40)      INTERVAL HPI/OVERNIGHT EVENTS:  pt with no acute distress  MEDICATIONS  (STANDING):  amiodarone    Tablet 200 milliGRAM(s) Oral daily  aspirin enteric coated 81 milliGRAM(s) Oral daily  docusate sodium 100 milliGRAM(s) Oral two times a day  levothyroxine 50 MICROGram(s) Oral daily  metoprolol succinate ER 25 milliGRAM(s) Oral daily  pantoprazole    Tablet 40 milliGRAM(s) Oral before breakfast  QUEtiapine 50 milliGRAM(s) Oral at bedtime  simvastatin 20 milliGRAM(s) Oral at bedtime    MEDICATIONS  (PRN):  acetaminophen   Tablet .. 650 milliGRAM(s) Oral every 6 hours PRN Temp greater or equal to 38C (100.4F), Mild Pain (1 - 3)  oxyCODONE    5 mG/acetaminophen 325 mG 1 Tablet(s) Oral every 6 hours PRN Severe Pain (7 - 10)      Allergies    lidocaine (Urticaria)    Intolerances        REVIEW OF SYSTEMS:  CONSTITUTIONAL: No fever, weight loss, or fatigue  EYES: No eye pain, visual disturbances, or discharge  ENMT:  No difficulty hearing, tinnitus, vertigo; No sinus or throat pain  NECK: No pain or stiffness  BREASTS: No pain, masses, or nipple discharge  RESPIRATORY: No cough, wheezing, chills or hemoptysis; No shortness of breath  CARDIOVASCULAR: No chest pain, palpitations, dizziness, or leg swelling  GASTROINTESTINAL: No abdominal or epigastric pain. No nausea, vomiting, or hematemesis; No diarrhea or constipation. No melena or hematochezia.  GENITOURINARY: No dysuria, frequency, hematuria, or incontinence  NEUROLOGICAL: No headaches, memory loss, loss of strength, numbness, or tremors  SKIN: No itching, burning, rashes, or lesions   LYMPH NODES: No enlarged glands  ENDOCRINE: No heat or cold intolerance; No hair loss  MUSCULOSKELETAL: No joint pain or swelling; No muscle, back, or extremity pain  PSYCHIATRIC: No depression, anxiety, mood swings, or difficulty sleeping  HEME/LYMPH: No easy bruising, or bleeding gums  ALLERGY AND IMMUNOLOGIC: No hives or eczema    Vital Signs Last 24 Hrs  T(C): 37.1 (2019 17:31), Max: 37.1 (2019 17:31)  T(F): 98.8 (2019 17:31), Max: 98.8 (2019 17:31)  HR: 58 (2019 17:31) (58 - 86)  BP: 121/85 (2019 17:31) (114/60 - 136/95)  BP(mean): --  RR: 18 (2019 17:31) (16 - 18)  SpO2: 98% (2019 17:31) (93% - 100%)    PHYSICAL EXAM:  GENERAL: NAD, well-groomed, well-developed  HEAD:  Atraumatic, Normocephalic  EYES: EOMI, PERRLA, conjunctiva and sclera clear  ENMT: No tonsillar erythema, exudates, or enlargement; Moist mucous membranes, Good dentition, No lesions  NECK: Supple, No JVD, Normal thyroid  NERVOUS SYSTEM:  Alert & Oriented X3, Good concentration; Motor Strength 5/5 B/L upper and lower extremities; DTRs 2+ intact and symmetric  CHEST/LUNG: Clear to percussion bilaterally; No rales, rhonchi, wheezing, or rubs  HEART: Regular rate and rhythm; No murmurs, rubs, or gallops  ABDOMEN: Soft, Nontender, Nondistended; Bowel sounds present  EXTREMITIES:  2+ Peripheral Pulses, No clubbing, cyanosis, or edema  LYMPH: No lymphadenopathy noted  SKIN: No rashes or lesions    LABS:                        13.8   5.91  )-----------( 140      ( 15 Maximo 2019 05:41 )             42.1     06-15    140  |  105  |  10  ----------------------------<  95  4.0   |  28  |  0.53    Ca    9.0      15 Maximo 2019 05:41    TPro  7.4  /  Alb  3.4  /  TBili  2.0<H>  /  DBili  x   /  AST  16  /  ALT  14  /  AlkPhos  73  06-15    PT/INR - ( 15 Maximo 2019 05:41 )   PT: 12.2 sec;   INR: 1.09 ratio         PTT - ( 15 Maximo 2019 05:41 )  PTT:33.4 sec  Urinalysis Basic - ( 15 Maximo 2019 10:10 )    Color: Yellow / Appearance: Clear / S.020 / pH: x  Gluc: x / Ketone: Small  / Bili: Negative / Urobili: 1 mg/dL   Blood: x / Protein: 30 mg/dL / Nitrite: Negative   Leuk Esterase: Trace / RBC: 0-2 /HPF / WBC 3-5   Sq Epi: x / Non Sq Epi: Few / Bacteria: Many      CAPILLARY BLOOD GLUCOSE        CULTURES:  Culture Results:   >100,000 CFU/ml Aerococcus species  "Aerococcus spp. are predictably susceptible to penicillin,  ampicillin, tetracycline, and vancomycin.  All isolates are  resistant to sulfonamides" (06-15 @ 12:55)    HEMOGLOBIN A1C:    CHOLESTEROL:        RADIOLOGY & ADDITIONAL TESTS:

## 2019-06-19 NOTE — PROGRESS NOTE ADULT - PROBLEM SELECTOR PROBLEM 3
Compression fracture of body of thoracic vertebra

## 2019-06-25 DIAGNOSIS — E78.5 HYPERLIPIDEMIA, UNSPECIFIED: ICD-10-CM

## 2019-06-25 DIAGNOSIS — M54.40 LUMBAGO WITH SCIATICA, UNSPECIFIED SIDE: ICD-10-CM

## 2019-06-25 DIAGNOSIS — E03.9 HYPOTHYROIDISM, UNSPECIFIED: ICD-10-CM

## 2019-06-25 DIAGNOSIS — Z79.52 LONG TERM (CURRENT) USE OF SYSTEMIC STEROIDS: ICD-10-CM

## 2019-06-25 DIAGNOSIS — M15.9 POLYOSTEOARTHRITIS, UNSPECIFIED: ICD-10-CM

## 2019-06-25 DIAGNOSIS — E43 UNSPECIFIED SEVERE PROTEIN-CALORIE MALNUTRITION: ICD-10-CM

## 2019-06-25 DIAGNOSIS — I48.91 UNSPECIFIED ATRIAL FIBRILLATION: ICD-10-CM

## 2019-06-25 DIAGNOSIS — Z79.82 LONG TERM (CURRENT) USE OF ASPIRIN: ICD-10-CM

## 2019-06-25 DIAGNOSIS — Z96.651 PRESENCE OF RIGHT ARTIFICIAL KNEE JOINT: ICD-10-CM

## 2019-06-25 DIAGNOSIS — M48.54XA COLLAPSED VERTEBRA, NOT ELSEWHERE CLASSIFIED, THORACIC REGION, INITIAL ENCOUNTER FOR FRACTURE: ICD-10-CM

## 2019-06-25 DIAGNOSIS — Z95.0 PRESENCE OF CARDIAC PACEMAKER: ICD-10-CM

## 2019-06-25 DIAGNOSIS — I10 ESSENTIAL (PRIMARY) HYPERTENSION: ICD-10-CM

## 2019-06-27 NOTE — ED ADULT TRIAGE NOTE - LOCATION:
Follow Up:  refractory genital HSV 2,  HIV    Inverval History/ROS:  vulvar ulcers unchanged.  feels OK.  studying.      ROS: no fever, chills.    Allergies  No Known Allergies        ANTIMICROBIALS:  acyclovir IVPB 400 every 8 hours  atazanavir 300 daily  azithromycin   Tablet 1200 <User Schedule>  ceFAZolin   IVPB 1000 every 8 hours  ceFAZolin   IVPB    emtricitabine 200 mG/tenofovir alafenamide 25 mG (DESCOVY) Tablet 1 daily  ritonavir Tablet 100 daily  trimethoprim  160 mG/sulfamethoxazole 800 mG 1 <User Schedule>      OTHER MEDS:  sodium chloride 0.9%. 1000 milliLiter(s) IV Continuous <Continuous>      Vital Signs Last 24 Hrs  T(C): 36.8 (2019 07:04), Max: 36.8 (2019 07:04)  T(F): 98.2 (2019 07:04), Max: 98.2 (2019 07:04)  HR: 69 (2019 07:04) (60 - 69)  BP: 136/82 (2019 07:04) (136/82 - 167/102)  BP(mean): --  RR: 14 (2019 07:04) (14 - 18)  SpO2: 99% (2019 07:04) (99% - 100%)    PHYSICAL EXAM:  General:  non-toxic  HEAD/EYES:  white sclera   ENT:   supple   Cardiovascular: S1S2  Respiratory:   clear to ausculation bilaterally  GI:   soft, non-tender, normal bowel sounds  :   no CVA tenderness   Musculoskeletal:   no synovitis  Neurologic:   non-focal exam   Skin:  no rash, bulla left arm from dressing around PIC  Psychiatric:   appropriate affect  alert & oriented  Lines:   no phlebitis, peripheral iv                                12.3   7.50  )-----------( 271      ( 2019 05:11 )             38.6       -    139  |  104  |  10  ----------------------------<  104<H>  3.8   |  24  |  0.70    Ca    9.1      2019 05:11    TPro  7.0  /  Alb  3.9  /  TBili  1.5<H>  /  DBili  x   /  AST  18  /  ALT  16  /  AlkPhos  73        Urinalysis Basic - ( 2019 15:40 )    Color: LIGHT YELLOW / Appearance: Lt TURBID / S.019 / pH: 8.0  Gluc: NEGATIVE / Ketone: NEGATIVE  / Bili: NEGATIVE / Urobili: NORMAL   Blood: NEGATIVE / Protein: 50 / Nitrite: NEGATIVE   Leuk Esterase: LARGE / RBC: 3-5 / WBC >50   Sq Epi: OCC / Non Sq Epi: x / Bacteria: NEGATIVE        MICROBIOLOGY:    outpatient viral culture 19 sent for sensitivities        RADIOLOGY: Left arm;

## 2019-07-22 ENCOUNTER — EMERGENCY (EMERGENCY)
Facility: HOSPITAL | Age: 84
LOS: 0 days | Discharge: HOME | End: 2019-07-22
Attending: EMERGENCY MEDICINE
Payer: MEDICARE

## 2019-07-22 VITALS
OXYGEN SATURATION: 100 % | TEMPERATURE: 97 F | RESPIRATION RATE: 20 BRPM | HEART RATE: 82 BPM | DIASTOLIC BLOOD PRESSURE: 98 MMHG | SYSTOLIC BLOOD PRESSURE: 138 MMHG

## 2019-07-22 VITALS
RESPIRATION RATE: 19 BRPM | HEART RATE: 75 BPM | SYSTOLIC BLOOD PRESSURE: 132 MMHG | TEMPERATURE: 97 F | DIASTOLIC BLOOD PRESSURE: 104 MMHG | OXYGEN SATURATION: 96 %

## 2019-07-22 DIAGNOSIS — I48.91 UNSPECIFIED ATRIAL FIBRILLATION: ICD-10-CM

## 2019-07-22 DIAGNOSIS — Z95.0 PRESENCE OF CARDIAC PACEMAKER: Chronic | ICD-10-CM

## 2019-07-22 DIAGNOSIS — I10 ESSENTIAL (PRIMARY) HYPERTENSION: ICD-10-CM

## 2019-07-22 DIAGNOSIS — E03.9 HYPOTHYROIDISM, UNSPECIFIED: ICD-10-CM

## 2019-07-22 DIAGNOSIS — Z96.659 PRESENCE OF UNSPECIFIED ARTIFICIAL KNEE JOINT: Chronic | ICD-10-CM

## 2019-07-22 DIAGNOSIS — B02.9 ZOSTER WITHOUT COMPLICATIONS: ICD-10-CM

## 2019-07-22 DIAGNOSIS — K59.00 CONSTIPATION, UNSPECIFIED: ICD-10-CM

## 2019-07-22 DIAGNOSIS — R10.9 UNSPECIFIED ABDOMINAL PAIN: ICD-10-CM

## 2019-07-22 LAB
ALBUMIN SERPL ELPH-MCNC: 3.5 G/DL — SIGNIFICANT CHANGE UP (ref 3.3–5)
ALP SERPL-CCNC: 63 U/L — SIGNIFICANT CHANGE UP (ref 40–120)
ALT FLD-CCNC: 20 U/L — SIGNIFICANT CHANGE UP (ref 12–78)
ANION GAP SERPL CALC-SCNC: 7 MMOL/L — SIGNIFICANT CHANGE UP (ref 5–17)
APPEARANCE UR: ABNORMAL
APTT BLD: 29.1 SEC — SIGNIFICANT CHANGE UP (ref 28.5–37)
AST SERPL-CCNC: 20 U/L — SIGNIFICANT CHANGE UP (ref 15–37)
BACTERIA # UR AUTO: ABNORMAL
BASOPHILS # BLD AUTO: 0.01 K/UL — SIGNIFICANT CHANGE UP (ref 0–0.2)
BASOPHILS NFR BLD AUTO: 0.2 % — SIGNIFICANT CHANGE UP (ref 0–2)
BILIRUB SERPL-MCNC: 2 MG/DL — HIGH (ref 0.2–1.2)
BILIRUB UR-MCNC: NEGATIVE — SIGNIFICANT CHANGE UP
BUN SERPL-MCNC: 14 MG/DL — SIGNIFICANT CHANGE UP (ref 7–23)
CALCIUM SERPL-MCNC: 8.9 MG/DL — SIGNIFICANT CHANGE UP (ref 8.5–10.1)
CHLORIDE SERPL-SCNC: 114 MMOL/L — HIGH (ref 96–108)
CK MB CFR SERPL CALC: <1 NG/ML — SIGNIFICANT CHANGE UP (ref 0.5–3.6)
CO2 SERPL-SCNC: 28 MMOL/L — SIGNIFICANT CHANGE UP (ref 22–31)
COLOR SPEC: ABNORMAL
COMMENT - URINE: SIGNIFICANT CHANGE UP
CREAT SERPL-MCNC: 0.5 MG/DL — SIGNIFICANT CHANGE UP (ref 0.5–1.3)
DIFF PNL FLD: ABNORMAL
EOSINOPHIL # BLD AUTO: 0.01 K/UL — SIGNIFICANT CHANGE UP (ref 0–0.5)
EOSINOPHIL NFR BLD AUTO: 0.2 % — SIGNIFICANT CHANGE UP (ref 0–6)
EPI CELLS # UR: SIGNIFICANT CHANGE UP
GLUCOSE SERPL-MCNC: 91 MG/DL — SIGNIFICANT CHANGE UP (ref 70–99)
GLUCOSE UR QL: NEGATIVE MG/DL — SIGNIFICANT CHANGE UP
GRAN CASTS # UR COMP ASSIST: ABNORMAL /LPF
HCT VFR BLD CALC: 40 % — SIGNIFICANT CHANGE UP (ref 34.5–45)
HGB BLD-MCNC: 12.9 G/DL — SIGNIFICANT CHANGE UP (ref 11.5–15.5)
HYALINE CASTS # UR AUTO: ABNORMAL /LPF
IMM GRANULOCYTES NFR BLD AUTO: 0.2 % — SIGNIFICANT CHANGE UP (ref 0–1.5)
INR BLD: 1.16 RATIO — SIGNIFICANT CHANGE UP (ref 0.88–1.16)
KETONES UR-MCNC: ABNORMAL
LEUKOCYTE ESTERASE UR-ACNC: ABNORMAL
LYMPHOCYTES # BLD AUTO: 0.52 K/UL — LOW (ref 1–3.3)
LYMPHOCYTES # BLD AUTO: 11.4 % — LOW (ref 13–44)
MAGNESIUM SERPL-MCNC: 2.9 MG/DL — HIGH (ref 1.6–2.6)
MCHC RBC-ENTMCNC: 32.3 GM/DL — SIGNIFICANT CHANGE UP (ref 32–36)
MCHC RBC-ENTMCNC: 33.7 PG — SIGNIFICANT CHANGE UP (ref 27–34)
MCV RBC AUTO: 104.4 FL — HIGH (ref 80–100)
MONOCYTES # BLD AUTO: 0.2 K/UL — SIGNIFICANT CHANGE UP (ref 0–0.9)
MONOCYTES NFR BLD AUTO: 4.4 % — SIGNIFICANT CHANGE UP (ref 2–14)
NEUTROPHILS # BLD AUTO: 3.82 K/UL — SIGNIFICANT CHANGE UP (ref 1.8–7.4)
NEUTROPHILS NFR BLD AUTO: 83.6 % — HIGH (ref 43–77)
NITRITE UR-MCNC: NEGATIVE — SIGNIFICANT CHANGE UP
NRBC # BLD: 0 /100 WBCS — SIGNIFICANT CHANGE UP (ref 0–0)
PH UR: 6 — SIGNIFICANT CHANGE UP (ref 5–8)
PLATELET # BLD AUTO: 165 K/UL — SIGNIFICANT CHANGE UP (ref 150–400)
POTASSIUM SERPL-MCNC: 3.6 MMOL/L — SIGNIFICANT CHANGE UP (ref 3.5–5.3)
POTASSIUM SERPL-SCNC: 3.6 MMOL/L — SIGNIFICANT CHANGE UP (ref 3.5–5.3)
PROT SERPL-MCNC: 7.2 GM/DL — SIGNIFICANT CHANGE UP (ref 6–8.3)
PROT UR-MCNC: 100 MG/DL
PROTHROM AB SERPL-ACNC: 13.1 SEC — HIGH (ref 10–12.9)
RBC # BLD: 3.83 M/UL — SIGNIFICANT CHANGE UP (ref 3.8–5.2)
RBC # FLD: 16.6 % — HIGH (ref 10.3–14.5)
RBC CASTS # UR COMP ASSIST: SIGNIFICANT CHANGE UP /HPF (ref 0–4)
SODIUM SERPL-SCNC: 149 MMOL/L — HIGH (ref 135–145)
SP GR SPEC: 1.02 — SIGNIFICANT CHANGE UP (ref 1.01–1.02)
TROPONIN I SERPL-MCNC: 0.02 NG/ML — SIGNIFICANT CHANGE UP (ref 0.01–0.04)
UROBILINOGEN FLD QL: 4 MG/DL
WBC # BLD: 4.57 K/UL — SIGNIFICANT CHANGE UP (ref 3.8–10.5)
WBC # FLD AUTO: 4.57 K/UL — SIGNIFICANT CHANGE UP (ref 3.8–10.5)
WBC UR QL: ABNORMAL

## 2019-07-22 PROCEDURE — 99284 EMERGENCY DEPT VISIT MOD MDM: CPT

## 2019-07-22 PROCEDURE — 93010 ELECTROCARDIOGRAM REPORT: CPT

## 2019-07-22 PROCEDURE — 74177 CT ABD & PELVIS W/CONTRAST: CPT | Mod: 26

## 2019-07-22 RX ORDER — MULTIVIT WITH MIN/MFOLATE/K2 340-15/3 G
1 POWDER (GRAM) ORAL ONCE
Refills: 0 | Status: COMPLETED | OUTPATIENT
Start: 2019-07-22 | End: 2019-07-22

## 2019-07-22 RX ORDER — DOCUSATE SODIUM 100 MG
1 CAPSULE ORAL
Qty: 28 | Refills: 0
Start: 2019-07-22 | End: 2019-08-04

## 2019-07-22 RX ORDER — SENNA PLUS 8.6 MG/1
1 TABLET ORAL
Qty: 14 | Refills: 0
Start: 2019-07-22 | End: 2019-08-04

## 2019-07-22 RX ORDER — ACETAMINOPHEN 500 MG
650 TABLET ORAL ONCE
Refills: 0 | Status: COMPLETED | OUTPATIENT
Start: 2019-07-22 | End: 2019-07-22

## 2019-07-22 RX ORDER — CEFTRIAXONE 500 MG/1
1000 INJECTION, POWDER, FOR SOLUTION INTRAMUSCULAR; INTRAVENOUS ONCE
Refills: 0 | Status: COMPLETED | OUTPATIENT
Start: 2019-07-22 | End: 2019-07-22

## 2019-07-22 RX ADMIN — CEFTRIAXONE 1000 MILLIGRAM(S): 500 INJECTION, POWDER, FOR SOLUTION INTRAMUSCULAR; INTRAVENOUS at 12:52

## 2019-07-22 RX ADMIN — Medication 650 MILLIGRAM(S): at 12:50

## 2019-07-22 RX ADMIN — Medication 1 BOTTLE: at 16:33

## 2019-07-22 RX ADMIN — CEFTRIAXONE 100 MILLIGRAM(S): 500 INJECTION, POWDER, FOR SOLUTION INTRAMUSCULAR; INTRAVENOUS at 12:21

## 2019-07-22 RX ADMIN — Medication 10 MILLIGRAM(S): at 16:27

## 2019-07-22 RX ADMIN — Medication 650 MILLIGRAM(S): at 12:20

## 2019-07-22 NOTE — ED PROVIDER NOTE - NSFOLLOWUPINSTRUCTIONS_ED_ALL_ED_FT
Take the LAXATIVE and STOOL SOFTENER to help you move your bowels. Follow up with your primary care doctor.

## 2019-07-22 NOTE — ED ADULT NURSE NOTE - ED STAT RN HANDOFF DETAILS
Report received from RN at 7pm. Assessment available on [facility]. will continue to monitor. awaiting family pickup

## 2019-07-22 NOTE — ED PROVIDER NOTE - CLINICAL SUMMARY MEDICAL DECISION MAKING FREE TEXT BOX
pt pw abd pain thought has none at this time. pt pw abd pain thought has none at this time. ct shows significant stool. will give bowel regimen and dc home with laxatives.

## 2019-07-22 NOTE — ED ADULT NURSE NOTE - NSIMPLEMENTINTERV_GEN_ALL_ED
Implemented All Fall with Harm Risk Interventions:  [place] to call system. Call bell, personal items and telephone within reach. Instruct patient to call for assistance. Room bathroom lighting operational. Non-slip footwear when patient is off stretcher. Physically safe environment: no spills, clutter or unnecessary equipment. Stretcher in lowest position, wheels locked, appropriate side rails in place. Provide visual cue, wrist band, yellow gown, etc. Monitor gait and stability. Monitor for mental status changes and reorient to person, place, and time. Review medications for side effects contributing to fall risk. Reinforce activity limits and safety measures with patient and family. Provide visual clues: red socks.

## 2019-07-22 NOTE — ED PROVIDER NOTE - CARE PLAN
Principal Discharge DX:	Lower abdominal pain Principal Discharge DX:	Constipation, unspecified constipation type

## 2019-07-22 NOTE — ED PROVIDER NOTE - OBJECTIVE STATEMENT
Pertinent PMH/PSH/FHx/SHx and Review of Systems contained within:  89yo F w PMH of Afib, chronic back pain, OA, herpes zoster, HTN, HL, hypothyroidism, multiple chronic lumbar and thoracic vertebral fractures pw abd pain. pt notes 3 days abd pain, intermittent. last felt it this morning but it has resolved without intervention. she has constipation. no fever, vomiting, fever. she did not take anything for symptoms. no cp, sob, ha, vision loss, rhinorrhea, dysuria, numbness, rash, bleeding  Fh and Sh not otherwise contributory  ROS otherwise negative

## 2019-08-01 ENCOUNTER — INPATIENT (INPATIENT)
Facility: HOSPITAL | Age: 84
LOS: 0 days | Discharge: LEFT BEFORE DISCHARGE | End: 2019-08-01
Attending: EMERGENCY MEDICINE | Admitting: INTERNAL MEDICINE
Payer: MEDICARE

## 2019-08-01 VITALS
SYSTOLIC BLOOD PRESSURE: 104 MMHG | DIASTOLIC BLOOD PRESSURE: 75 MMHG | TEMPERATURE: 97 F | RESPIRATION RATE: 18 BRPM | OXYGEN SATURATION: 99 % | WEIGHT: 98.99 LBS | HEART RATE: 85 BPM

## 2019-08-01 VITALS
RESPIRATION RATE: 22 BRPM | HEART RATE: 71 BPM | TEMPERATURE: 98 F | OXYGEN SATURATION: 98 % | SYSTOLIC BLOOD PRESSURE: 108 MMHG | DIASTOLIC BLOOD PRESSURE: 75 MMHG

## 2019-08-01 DIAGNOSIS — Z95.0 PRESENCE OF CARDIAC PACEMAKER: Chronic | ICD-10-CM

## 2019-08-01 DIAGNOSIS — Z96.659 PRESENCE OF UNSPECIFIED ARTIFICIAL KNEE JOINT: Chronic | ICD-10-CM

## 2019-08-01 LAB
ALBUMIN SERPL ELPH-MCNC: 3.3 G/DL — SIGNIFICANT CHANGE UP (ref 3.3–5)
ALP SERPL-CCNC: 69 U/L — SIGNIFICANT CHANGE UP (ref 40–120)
ALT FLD-CCNC: 18 U/L — SIGNIFICANT CHANGE UP (ref 12–78)
ANION GAP SERPL CALC-SCNC: 6 MMOL/L — SIGNIFICANT CHANGE UP (ref 5–17)
AST SERPL-CCNC: 21 U/L — SIGNIFICANT CHANGE UP (ref 15–37)
BASE EXCESS BLDA CALC-SCNC: 4 MMOL/L — HIGH (ref -2–2)
BASOPHILS # BLD AUTO: 0.02 K/UL — SIGNIFICANT CHANGE UP (ref 0–0.2)
BASOPHILS NFR BLD AUTO: 0.4 % — SIGNIFICANT CHANGE UP (ref 0–2)
BILIRUB SERPL-MCNC: 0.8 MG/DL — SIGNIFICANT CHANGE UP (ref 0.2–1.2)
BLOOD GAS COMMENTS: SIGNIFICANT CHANGE UP
BLOOD GAS SOURCE: SIGNIFICANT CHANGE UP
BUN SERPL-MCNC: 17 MG/DL — SIGNIFICANT CHANGE UP (ref 7–23)
CALCIUM SERPL-MCNC: 8.4 MG/DL — LOW (ref 8.5–10.1)
CHLORIDE SERPL-SCNC: 107 MMOL/L — SIGNIFICANT CHANGE UP (ref 96–108)
CK MB CFR SERPL CALC: 1.5 NG/ML — SIGNIFICANT CHANGE UP (ref 0.5–3.6)
CO2 SERPL-SCNC: 28 MMOL/L — SIGNIFICANT CHANGE UP (ref 22–31)
CREAT SERPL-MCNC: 0.69 MG/DL — SIGNIFICANT CHANGE UP (ref 0.5–1.3)
D DIMER BLD IA.RAPID-MCNC: 951 NG/ML DDU — HIGH
EOSINOPHIL # BLD AUTO: 0.01 K/UL — SIGNIFICANT CHANGE UP (ref 0–0.5)
EOSINOPHIL NFR BLD AUTO: 0.2 % — SIGNIFICANT CHANGE UP (ref 0–6)
GLUCOSE SERPL-MCNC: 94 MG/DL — SIGNIFICANT CHANGE UP (ref 70–99)
HCO3 BLDA-SCNC: 27 MMOL/L — SIGNIFICANT CHANGE UP (ref 21–29)
HCT VFR BLD CALC: 42.3 % — SIGNIFICANT CHANGE UP (ref 34.5–45)
HGB BLD-MCNC: 13.6 G/DL — SIGNIFICANT CHANGE UP (ref 11.5–15.5)
HOROWITZ INDEX BLDA+IHG-RTO: 21 — SIGNIFICANT CHANGE UP
IMM GRANULOCYTES NFR BLD AUTO: 0.2 % — SIGNIFICANT CHANGE UP (ref 0–1.5)
INR BLD: 1.07 RATIO — SIGNIFICANT CHANGE UP (ref 0.88–1.16)
LIDOCAIN IGE QN: 108 U/L — SIGNIFICANT CHANGE UP (ref 73–393)
LYMPHOCYTES # BLD AUTO: 0.62 K/UL — LOW (ref 1–3.3)
LYMPHOCYTES # BLD AUTO: 11.7 % — LOW (ref 13–44)
MAGNESIUM SERPL-MCNC: 2.4 MG/DL — SIGNIFICANT CHANGE UP (ref 1.6–2.6)
MCHC RBC-ENTMCNC: 32.2 GM/DL — SIGNIFICANT CHANGE UP (ref 32–36)
MCHC RBC-ENTMCNC: 33.3 PG — SIGNIFICANT CHANGE UP (ref 27–34)
MCV RBC AUTO: 103.7 FL — HIGH (ref 80–100)
MONOCYTES # BLD AUTO: 0.36 K/UL — SIGNIFICANT CHANGE UP (ref 0–0.9)
MONOCYTES NFR BLD AUTO: 6.8 % — SIGNIFICANT CHANGE UP (ref 2–14)
NEUTROPHILS # BLD AUTO: 4.29 K/UL — SIGNIFICANT CHANGE UP (ref 1.8–7.4)
NEUTROPHILS NFR BLD AUTO: 80.7 % — HIGH (ref 43–77)
NRBC # BLD: 0 /100 WBCS — SIGNIFICANT CHANGE UP (ref 0–0)
PCO2 BLDA: 38 MMHG — SIGNIFICANT CHANGE UP (ref 32–46)
PH BLD: 7.47 — HIGH (ref 7.35–7.45)
PLATELET # BLD AUTO: 146 K/UL — LOW (ref 150–400)
PO2 BLDA: 53 MMHG — LOW (ref 74–108)
POTASSIUM SERPL-MCNC: 4.3 MMOL/L — SIGNIFICANT CHANGE UP (ref 3.5–5.3)
POTASSIUM SERPL-SCNC: 4.3 MMOL/L — SIGNIFICANT CHANGE UP (ref 3.5–5.3)
PROT SERPL-MCNC: 7.1 GM/DL — SIGNIFICANT CHANGE UP (ref 6–8.3)
PROTHROM AB SERPL-ACNC: 12 SEC — SIGNIFICANT CHANGE UP (ref 10–12.9)
RBC # BLD: 4.08 M/UL — SIGNIFICANT CHANGE UP (ref 3.8–5.2)
RBC # FLD: 15.9 % — HIGH (ref 10.3–14.5)
SAO2 % BLDA: 88 % — LOW (ref 92–96)
SODIUM SERPL-SCNC: 141 MMOL/L — SIGNIFICANT CHANGE UP (ref 135–145)
TROPONIN I SERPL-MCNC: <.015 NG/ML — SIGNIFICANT CHANGE UP (ref 0.01–0.04)
WBC # BLD: 5.31 K/UL — SIGNIFICANT CHANGE UP (ref 3.8–10.5)
WBC # FLD AUTO: 5.31 K/UL — SIGNIFICANT CHANGE UP (ref 3.8–10.5)

## 2019-08-01 PROCEDURE — 74177 CT ABD & PELVIS W/CONTRAST: CPT | Mod: 26

## 2019-08-01 PROCEDURE — 71045 X-RAY EXAM CHEST 1 VIEW: CPT | Mod: 26

## 2019-08-01 PROCEDURE — 76700 US EXAM ABDOM COMPLETE: CPT | Mod: 26

## 2019-08-01 PROCEDURE — 99285 EMERGENCY DEPT VISIT HI MDM: CPT

## 2019-08-01 PROCEDURE — 71275 CT ANGIOGRAPHY CHEST: CPT | Mod: 26

## 2019-08-01 PROCEDURE — 93010 ELECTROCARDIOGRAM REPORT: CPT

## 2019-08-01 RX ORDER — MINERAL OIL
133 OIL (ML) MISCELLANEOUS ONCE
Refills: 0 | Status: COMPLETED | OUTPATIENT
Start: 2019-08-01 | End: 2019-08-01

## 2019-08-01 RX ORDER — MULTIVIT WITH MIN/MFOLATE/K2 340-15/3 G
1 POWDER (GRAM) ORAL ONCE
Refills: 0 | Status: COMPLETED | OUTPATIENT
Start: 2019-08-01 | End: 2019-08-01

## 2019-08-01 RX ADMIN — Medication 1 BOTTLE: at 09:40

## 2019-08-01 RX ADMIN — Medication 133 MILLILITER(S): at 09:40

## 2019-08-01 NOTE — ED PROVIDER NOTE - NSFOLLOWUPINSTRUCTIONS_ED_ALL_ED_FT
You are leaving against medical advice even though we have not full investigated your heart.    Call your regular doctor for an immediate follow up appointment or return to the nearest emergency room.

## 2019-08-01 NOTE — ED ADULT NURSE NOTE - PSH
H/O cardiac pacemaker  [employer] placed 12/10  S/P Colonoscopic Polypectomy  15 years ago per pt  S/P TKR (total knee replacement)  Right TKR in 5/16

## 2019-08-01 NOTE — ED ADULT TRIAGE NOTE - CHIEF COMPLAINT QUOTE
PT BIBA with c/o palpitations, chest pain and abdominal pain + constipation, as per medics PVC's in the field PT BIBA with c/o palpitations, chest pain and abdominal pain + constipation, as per medics PVC's in the field, 162 mg ASA po given in the field

## 2019-08-01 NOTE — ED PROVIDER NOTE - PHYSICAL EXAMINATION
Gen: Alert, NAD, generally frail black woman   Head: NC, AT   Eyes: PERRL, EOMI, normal lids/conjunctiva  ENT: normal hearing, patent oropharynx without erythema/exudate, uvula midline  Neck: supple, no tenderness, Trachea midline  Pulm: Bilateral BS, normal resp effort, no wheeze/stridor/retractions  CV: RRR, prominent systolic murmur, pacemaker in site   Abd: soft, NT/ND, +BS, no hepatosplenomegaly  Mskel: cachectic, kyphotic spine   Skin: no rash, no bruising   Neuro: AAOx3, no sensory/motor deficits, CN 2-12 intact

## 2019-08-01 NOTE — PATIENT PROFILE ADULT - NSPROGENSOURCEINFO_GEN_A_NUR
Pt comes in with 2 days of Ear Pain, Cough, and Tactile Fever. Mother states that she herself also has similar symptoms. Pt given Tylenol by parent around 1500 today. Pt has been making wet diaper but mother admits that PO intake has decreased. Pt has moist mucous membranes, is drooling and has large tears while crying. Cough dry during triage, crust at nares.    Son, Rick/family

## 2019-08-01 NOTE — ED PROVIDER NOTE - OBJECTIVE STATEMENT
Pertinent PMH/PSH/FHx/SHx and Review of Systems contained within:  89yo F w PMH of Afib, chronic back pain, OA, herpes zoster, HTN, HL, hypothyroidism, multiple chronic lumbar and thoracic vertebral fractures pw abd pain, cp, palpitations and constipation. patient notes 3 days of symptoms. describes a "cover" around the abd and back. the cp is midsternal. she notes sob like she cant catch her breath. She did not take anything for pain. she is on laxatives. ros neg for ha, vision loss, rhinorrhea, dysuria, rash, bleeding, fever, chills, cough  Fh and Sh not otherwise contributory  ROS otherwise negative

## 2019-08-01 NOTE — ED ADULT NURSE NOTE - CHIEF COMPLAINT QUOTE
PT BIBA with c/o palpitations, chest pain and abdominal pain + constipation, as per medics PVC's in the field, 162 mg ASA po given in the field

## 2019-08-01 NOTE — ED PROVIDER NOTE - PROGRESS NOTE DETAILS
patient feeling better and wants to leave. I advised patient we strongly wanted her admitted. she initially agreed but now declines and told the hospitalist she wants to leave ama. given she is has capacity to do so and full understands she may still have life threatening heart disease, will dc as she requests.

## 2019-08-01 NOTE — ED ADULT NURSE NOTE - INTERVENTIONS DEFINITIONS
[place] to call system/Instruct patient to call for assistance/Non-slip footwear when patient is off stretcher

## 2019-08-01 NOTE — ED PROVIDER NOTE - CLINICAL SUMMARY MEDICAL DECISION MAKING FREE TEXT BOX
constipation, cp. will give laxative and enema. will rule out for acs and pe. likely admit given age and comorbidities.  As interpreted by ED physician, ECG is NSR with normal intervals/axis, no changes in QRS, no ST/T changes. constipation, cp. will give laxative and enema. will rule out for acs and pe. likely admit given age and comorbidities.  As interpreted by ED physician, ECG is NSR with normal intervals/axis, no changes in QRS, no ST/T changes.  initial trop neg. cta chest neg. abd full of stool. enema given. will admit for cp in elderly high risk patient.

## 2019-08-06 DIAGNOSIS — R07.9 CHEST PAIN, UNSPECIFIED: ICD-10-CM

## 2019-08-06 DIAGNOSIS — K59.09 OTHER CONSTIPATION: ICD-10-CM

## 2019-08-06 DIAGNOSIS — G89.29 OTHER CHRONIC PAIN: ICD-10-CM

## 2019-08-06 DIAGNOSIS — Z95.0 PRESENCE OF CARDIAC PACEMAKER: ICD-10-CM

## 2019-08-06 DIAGNOSIS — I10 ESSENTIAL (PRIMARY) HYPERTENSION: ICD-10-CM

## 2019-08-06 DIAGNOSIS — E03.9 HYPOTHYROIDISM, UNSPECIFIED: ICD-10-CM

## 2019-08-06 DIAGNOSIS — I48.91 UNSPECIFIED ATRIAL FIBRILLATION: ICD-10-CM

## 2019-08-06 DIAGNOSIS — M19.90 UNSPECIFIED OSTEOARTHRITIS, UNSPECIFIED SITE: ICD-10-CM

## 2019-08-06 DIAGNOSIS — M54.9 DORSALGIA, UNSPECIFIED: ICD-10-CM

## 2019-08-06 DIAGNOSIS — L89.152 PRESSURE ULCER OF SACRAL REGION, STAGE 2: ICD-10-CM

## 2019-08-06 DIAGNOSIS — E78.5 HYPERLIPIDEMIA, UNSPECIFIED: ICD-10-CM

## 2019-08-08 ENCOUNTER — EMERGENCY (EMERGENCY)
Facility: HOSPITAL | Age: 84
LOS: 0 days | Discharge: HOME | End: 2019-08-08
Attending: STUDENT IN AN ORGANIZED HEALTH CARE EDUCATION/TRAINING PROGRAM
Payer: MEDICARE

## 2019-08-08 VITALS
HEART RATE: 77 BPM | HEIGHT: 65 IN | TEMPERATURE: 98 F | RESPIRATION RATE: 19 BRPM | SYSTOLIC BLOOD PRESSURE: 140 MMHG | WEIGHT: 102.96 LBS | OXYGEN SATURATION: 98 % | DIASTOLIC BLOOD PRESSURE: 71 MMHG

## 2019-08-08 VITALS
HEART RATE: 70 BPM | SYSTOLIC BLOOD PRESSURE: 103 MMHG | RESPIRATION RATE: 16 BRPM | OXYGEN SATURATION: 97 % | TEMPERATURE: 98 F | DIASTOLIC BLOOD PRESSURE: 74 MMHG

## 2019-08-08 DIAGNOSIS — E03.9 HYPOTHYROIDISM, UNSPECIFIED: ICD-10-CM

## 2019-08-08 DIAGNOSIS — E78.00 PURE HYPERCHOLESTEROLEMIA, UNSPECIFIED: ICD-10-CM

## 2019-08-08 DIAGNOSIS — Z95.0 PRESENCE OF CARDIAC PACEMAKER: Chronic | ICD-10-CM

## 2019-08-08 DIAGNOSIS — I10 ESSENTIAL (PRIMARY) HYPERTENSION: ICD-10-CM

## 2019-08-08 DIAGNOSIS — Z88.8 ALLERGY STATUS TO OTHER DRUGS, MEDICAMENTS AND BIOLOGICAL SUBSTANCES STATUS: ICD-10-CM

## 2019-08-08 DIAGNOSIS — M54.9 DORSALGIA, UNSPECIFIED: ICD-10-CM

## 2019-08-08 DIAGNOSIS — Z96.659 PRESENCE OF UNSPECIFIED ARTIFICIAL KNEE JOINT: Chronic | ICD-10-CM

## 2019-08-08 DIAGNOSIS — M19.90 UNSPECIFIED OSTEOARTHRITIS, UNSPECIFIED SITE: ICD-10-CM

## 2019-08-08 DIAGNOSIS — I48.91 UNSPECIFIED ATRIAL FIBRILLATION: ICD-10-CM

## 2019-08-08 DIAGNOSIS — M54.89 OTHER DORSALGIA: ICD-10-CM

## 2019-08-08 LAB
ALBUMIN SERPL ELPH-MCNC: 3.2 G/DL — LOW (ref 3.3–5)
ALP SERPL-CCNC: 64 U/L — SIGNIFICANT CHANGE UP (ref 40–120)
ALT FLD-CCNC: 17 U/L — SIGNIFICANT CHANGE UP (ref 12–78)
AMYLASE P1 CFR SERPL: 107 U/L — SIGNIFICANT CHANGE UP (ref 25–115)
ANION GAP SERPL CALC-SCNC: 8 MMOL/L — SIGNIFICANT CHANGE UP (ref 5–17)
APPEARANCE UR: ABNORMAL
AST SERPL-CCNC: 20 U/L — SIGNIFICANT CHANGE UP (ref 15–37)
BACTERIA # UR AUTO: ABNORMAL
BASOPHILS # BLD AUTO: 0.03 K/UL — SIGNIFICANT CHANGE UP (ref 0–0.2)
BASOPHILS NFR BLD AUTO: 0.5 % — SIGNIFICANT CHANGE UP (ref 0–2)
BILIRUB DIRECT SERPL-MCNC: 0.28 MG/DL — HIGH (ref 0.05–0.2)
BILIRUB INDIRECT FLD-MCNC: 1.1 MG/DL — HIGH (ref 0.2–1)
BILIRUB SERPL-MCNC: 1.4 MG/DL — HIGH (ref 0.2–1.2)
BILIRUB UR-MCNC: NEGATIVE — SIGNIFICANT CHANGE UP
BUN SERPL-MCNC: 14 MG/DL — SIGNIFICANT CHANGE UP (ref 7–23)
CALCIUM SERPL-MCNC: 8.7 MG/DL — SIGNIFICANT CHANGE UP (ref 8.5–10.1)
CHLORIDE SERPL-SCNC: 108 MMOL/L — SIGNIFICANT CHANGE UP (ref 96–108)
CO2 SERPL-SCNC: 27 MMOL/L — SIGNIFICANT CHANGE UP (ref 22–31)
COLOR SPEC: YELLOW — SIGNIFICANT CHANGE UP
CREAT SERPL-MCNC: 0.58 MG/DL — SIGNIFICANT CHANGE UP (ref 0.5–1.3)
DIFF PNL FLD: ABNORMAL
EOSINOPHIL # BLD AUTO: 0.01 K/UL — SIGNIFICANT CHANGE UP (ref 0–0.5)
EOSINOPHIL NFR BLD AUTO: 0.2 % — SIGNIFICANT CHANGE UP (ref 0–6)
EPI CELLS # UR: SIGNIFICANT CHANGE UP
GLUCOSE SERPL-MCNC: 75 MG/DL — SIGNIFICANT CHANGE UP (ref 70–99)
GLUCOSE UR QL: NEGATIVE MG/DL — SIGNIFICANT CHANGE UP
HCT VFR BLD CALC: 42 % — SIGNIFICANT CHANGE UP (ref 34.5–45)
HGB BLD-MCNC: 13.6 G/DL — SIGNIFICANT CHANGE UP (ref 11.5–15.5)
IMM GRANULOCYTES NFR BLD AUTO: 0.2 % — SIGNIFICANT CHANGE UP (ref 0–1.5)
KETONES UR-MCNC: ABNORMAL
LEUKOCYTE ESTERASE UR-ACNC: ABNORMAL
LIDOCAIN IGE QN: 57 U/L — LOW (ref 73–393)
LYMPHOCYTES # BLD AUTO: 0.78 K/UL — LOW (ref 1–3.3)
LYMPHOCYTES # BLD AUTO: 13.7 % — SIGNIFICANT CHANGE UP (ref 13–44)
MCHC RBC-ENTMCNC: 32.4 GM/DL — SIGNIFICANT CHANGE UP (ref 32–36)
MCHC RBC-ENTMCNC: 33.6 PG — SIGNIFICANT CHANGE UP (ref 27–34)
MCV RBC AUTO: 103.7 FL — HIGH (ref 80–100)
MONOCYTES # BLD AUTO: 0.27 K/UL — SIGNIFICANT CHANGE UP (ref 0–0.9)
MONOCYTES NFR BLD AUTO: 4.7 % — SIGNIFICANT CHANGE UP (ref 2–14)
NEUTROPHILS # BLD AUTO: 4.59 K/UL — SIGNIFICANT CHANGE UP (ref 1.8–7.4)
NEUTROPHILS NFR BLD AUTO: 80.7 % — HIGH (ref 43–77)
NITRITE UR-MCNC: NEGATIVE — SIGNIFICANT CHANGE UP
NRBC # BLD: 0 /100 WBCS — SIGNIFICANT CHANGE UP (ref 0–0)
PH UR: 7 — SIGNIFICANT CHANGE UP (ref 5–8)
PLATELET # BLD AUTO: 137 K/UL — LOW (ref 150–400)
POTASSIUM SERPL-MCNC: 4 MMOL/L — SIGNIFICANT CHANGE UP (ref 3.5–5.3)
POTASSIUM SERPL-SCNC: 4 MMOL/L — SIGNIFICANT CHANGE UP (ref 3.5–5.3)
PROT SERPL-MCNC: 6.9 GM/DL — SIGNIFICANT CHANGE UP (ref 6–8.3)
PROT UR-MCNC: 30 MG/DL
RBC # BLD: 4.05 M/UL — SIGNIFICANT CHANGE UP (ref 3.8–5.2)
RBC # FLD: 15 % — HIGH (ref 10.3–14.5)
RBC CASTS # UR COMP ASSIST: ABNORMAL /HPF (ref 0–4)
SODIUM SERPL-SCNC: 143 MMOL/L — SIGNIFICANT CHANGE UP (ref 135–145)
SP GR SPEC: 1 — LOW (ref 1.01–1.02)
UROBILINOGEN FLD QL: NEGATIVE MG/DL — SIGNIFICANT CHANGE UP
WBC # BLD: 5.69 K/UL — SIGNIFICANT CHANGE UP (ref 3.8–10.5)
WBC # FLD AUTO: 5.69 K/UL — SIGNIFICANT CHANGE UP (ref 3.8–10.5)
WBC UR QL: >50

## 2019-08-08 PROCEDURE — 93010 ELECTROCARDIOGRAM REPORT: CPT

## 2019-08-08 PROCEDURE — 71045 X-RAY EXAM CHEST 1 VIEW: CPT | Mod: 26

## 2019-08-08 PROCEDURE — 99284 EMERGENCY DEPT VISIT MOD MDM: CPT

## 2019-08-08 RX ORDER — TRAMADOL HYDROCHLORIDE 50 MG/1
50 TABLET ORAL ONCE
Refills: 0 | Status: DISCONTINUED | OUTPATIENT
Start: 2019-08-08 | End: 2019-08-08

## 2019-08-08 RX ORDER — CEFTRIAXONE 500 MG/1
1000 INJECTION, POWDER, FOR SOLUTION INTRAMUSCULAR; INTRAVENOUS ONCE
Refills: 0 | Status: DISCONTINUED | OUTPATIENT
Start: 2019-08-08 | End: 2019-08-08

## 2019-08-08 RX ORDER — IBUPROFEN 200 MG
600 TABLET ORAL ONCE
Refills: 0 | Status: COMPLETED | OUTPATIENT
Start: 2019-08-08 | End: 2019-08-08

## 2019-08-08 RX ORDER — CEFTRIAXONE 500 MG/1
1000 INJECTION, POWDER, FOR SOLUTION INTRAMUSCULAR; INTRAVENOUS ONCE
Refills: 0 | Status: COMPLETED | OUTPATIENT
Start: 2019-08-08 | End: 2019-08-08

## 2019-08-08 RX ORDER — SODIUM CHLORIDE 9 MG/ML
1000 INJECTION INTRAMUSCULAR; INTRAVENOUS; SUBCUTANEOUS ONCE
Refills: 0 | Status: COMPLETED | OUTPATIENT
Start: 2019-08-08 | End: 2019-08-08

## 2019-08-08 RX ADMIN — TRAMADOL HYDROCHLORIDE 50 MILLIGRAM(S): 50 TABLET ORAL at 12:30

## 2019-08-08 RX ADMIN — SODIUM CHLORIDE 1000 MILLILITER(S): 9 INJECTION INTRAMUSCULAR; INTRAVENOUS; SUBCUTANEOUS at 11:15

## 2019-08-08 RX ADMIN — CEFTRIAXONE 1000 MILLIGRAM(S): 500 INJECTION, POWDER, FOR SOLUTION INTRAMUSCULAR; INTRAVENOUS at 12:00

## 2019-08-08 RX ADMIN — Medication 30 MILLILITER(S): at 10:15

## 2019-08-08 RX ADMIN — Medication 600 MILLIGRAM(S): at 10:15

## 2019-08-08 RX ADMIN — SODIUM CHLORIDE 1000 MILLILITER(S): 9 INJECTION INTRAMUSCULAR; INTRAVENOUS; SUBCUTANEOUS at 10:15

## 2019-08-08 RX ADMIN — Medication 600 MILLIGRAM(S): at 11:15

## 2019-08-08 RX ADMIN — CEFTRIAXONE 100 MILLIGRAM(S): 500 INJECTION, POWDER, FOR SOLUTION INTRAMUSCULAR; INTRAVENOUS at 11:25

## 2019-08-08 RX ADMIN — TRAMADOL HYDROCHLORIDE 50 MILLIGRAM(S): 50 TABLET ORAL at 11:24

## 2019-08-08 NOTE — ED PROVIDER NOTE - PROGRESS NOTE DETAILS
spoke to pts daughter Bailey ([phone]) who  state that she chronically c/o back pain, she does have anxiet and the older she gets, she feels it is getting worse, she is prescribed pain medication, she does call EMS, she would prefer that she does not get admitted and will find out who is home and call me back

## 2019-08-08 NOTE — ED ADULT TRIAGE NOTE - CHIEF COMPLAINT QUOTE
patient BIBA c/o of abdominal pain , back pain and diarrhea , denied N/V , denied chest pain denied difficulty breathing at the time  of triage

## 2019-08-08 NOTE — ED PROVIDER NOTE - PSH
H/O cardiac pacemaker  [employer] placed 12/10  S/P Colonoscopic Polypectomy  15 years ago per pt  S/P TKR (total knee replacement)  Right TKR in 5/16

## 2019-08-08 NOTE — ED PROVIDER NOTE - OBJECTIVE STATEMENT
89 year old female presents today c/o left upper back pain this morning, she did take Tylenol which helped very little, she describes pain aggravated with movement, (-) trauma (-) rash (-) swelling (-) chest pain (-) sob, pt denies abdominal pain (as reported as reported as her chief complaint)

## 2019-08-08 NOTE — ED ADULT NURSE NOTE - OBJECTIVE STATEMENT
patient BIBA c/o of abdominal pain , back pain and diarrhea  that started yesterday.  denied N/V , denied chest pain denied difficulty breathing .  PMH pacemaker ( 5 years ago and denies taking blood thinner, last interrogation Jan, 2019 as per patient)/ HTN.

## 2019-08-08 NOTE — ED PROVIDER NOTE - MUSCULOSKELETAL, MLM
Spine appears normal, range of motion is not limited, +left upper back tenderness with palpation of his muscle

## 2019-08-08 NOTE — ED ADULT NURSE NOTE - NSIMPLEMENTINTERV_GEN_ALL_ED
Implemented All Fall with Harm Risk Interventions:  [place] to call system. Call bell, personal items and telephone within reach. Instruct patient to call for assistance. Room bathroom lighting operational. Non-slip footwear when patient is off stretcher. Physically safe environment: no spills, clutter or unnecessary equipment. Stretcher in lowest position, wheels locked, appropriate side rails in place. Provide visual cue, wrist band, yellow gown, etc. Monitor gait and stability. Monitor for mental status changes and reorient to person, place, and time. Review medications for side effects contributing to fall risk. Reinforce activity limits and safety measures with patient and family. Provide visual clues: red socks.

## 2019-08-09 LAB
CULTURE RESULTS: SIGNIFICANT CHANGE UP
SPECIMEN SOURCE: SIGNIFICANT CHANGE UP

## 2019-08-16 ENCOUNTER — APPOINTMENT (OUTPATIENT)
Dept: ELECTROPHYSIOLOGY | Facility: CLINIC | Age: 84
End: 2019-08-16
Payer: MEDICARE

## 2019-08-22 ENCOUNTER — OTHER (OUTPATIENT)
Age: 84
End: 2019-08-22

## 2019-08-27 ENCOUNTER — RX RENEWAL (OUTPATIENT)
Age: 84
End: 2019-08-27

## 2019-08-27 ENCOUNTER — APPOINTMENT (OUTPATIENT)
Dept: ELECTROPHYSIOLOGY | Facility: CLINIC | Age: 84
End: 2019-08-27
Payer: MEDICARE

## 2019-08-27 VITALS
DIASTOLIC BLOOD PRESSURE: 77 MMHG | HEIGHT: 64 IN | BODY MASS INDEX: 17.07 KG/M2 | WEIGHT: 100 LBS | SYSTOLIC BLOOD PRESSURE: 117 MMHG

## 2019-08-27 DIAGNOSIS — Z95.810 PRESENCE OF AUTOMATIC (IMPLANTABLE) CARDIAC DEFIBRILLATOR: ICD-10-CM

## 2019-08-27 DIAGNOSIS — Z79.899 OTHER LONG TERM (CURRENT) DRUG THERAPY: ICD-10-CM

## 2019-08-27 PROCEDURE — 99214 OFFICE O/P EST MOD 30 MIN: CPT

## 2019-08-27 NOTE — PHYSICAL EXAM
[General Appearance - Well Developed] : well developed [Normal Appearance] : normal appearance [Well Groomed] : well groomed [General Appearance - Well Nourished] : well nourished [No Deformities] : no deformities [Normal Conjunctiva] : the conjunctiva exhibited no abnormalities [General Appearance - In No Acute Distress] : no acute distress [Eyelids - No Xanthelasma] : the eyelids demonstrated no xanthelasmas [Normal Oral Mucosa] : normal oral mucosa [No Oral Cyanosis] : no oral cyanosis [Normal Jugular Venous A Waves Present] : normal jugular venous A waves present [No Oral Pallor] : no oral pallor [Normal Jugular Venous V Waves Present] : normal jugular venous V waves present [No Jugular Venous Arguello A Waves] : no jugular venous arguello A waves [Respiration, Rhythm And Depth] : normal respiratory rhythm and effort [Auscultation Breath Sounds / Voice Sounds] : lungs were clear to auscultation bilaterally [Exaggerated Use Of Accessory Muscles For Inspiration] : no accessory muscle use [Murmurs] : no murmurs present [Heart Sounds] : normal S1 and S2 [Heart Rate And Rhythm] : heart rate and rhythm were normal [Abdomen Soft] : soft [Abdomen Tenderness] : non-tender [Abdomen Mass (___ Cm)] : no abdominal mass palpated [Abnormal Walk] : normal gait [Nail Clubbing] : no clubbing of the fingernails [Gait - Sufficient For Exercise Testing] : the gait was sufficient for exercise testing [Cyanosis, Localized] : no localized cyanosis [Petechial Hemorrhages (___cm)] : no petechial hemorrhages [Skin Color & Pigmentation] : normal skin color and pigmentation [] : no rash [Skin Lesions] : no skin lesions [No Venous Stasis] : no venous stasis [No Skin Ulcers] : no skin ulcer [Oriented To Time, Place, And Person] : oriented to person, place, and time [No Xanthoma] : no  xanthoma was observed [Mood] : the mood was normal [Affect] : the affect was normal [No Anxiety] : not feeling anxious

## 2019-08-28 NOTE — DISCUSSION/SUMMARY
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 Suffering from chronic back pain, on tramadol which she did not take today. In pain in office. Remote monitoring revealed episode of VT s/p ATP therapy and eventual ICD shock on 8/14/2019. Admits doing well from a cardiac standpoint. Suffering mostly from back pain with feeling as if she is going to vomit. Denies chest pain, palpitations, SOB, syncope or near syncope. Given recent asymptomatic VT, recommend increasing metoprolol to 50mg daily. Remains on Amiodarone. Rediscussed adverse effect profile of Amiodarone including need for frequent monitoring of TFTs, LFTs, Opthalmology examination and PFTs. \par \par Sincerely,\par \par Bladimir Gomez MD

## 2019-08-28 NOTE — HISTORY OF PRESENT ILLNESS
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 Suffering from chronic back pain, on tramadol which she did not take today. In pain in office. Remote monitoring revealed episode of VT s/p ATP therapy and eventual ICD shock on 8/14/2019. Admits doing well from a cardiac standpoint. Suffering mostly from back pain with feeling as if she is going to vomit. Denies chest pain, palpitations, SOB, syncope or near syncope.

## 2019-08-29 NOTE — DISCHARGE NOTE ADULT - NSFTFHOME1RD_GEN_ALL_CORE
[facility] AND [facility] Infectious Disease Progress Note    Frank Carreon Patient Status:  Inpatient    4/10/1935 MRN [id number]   Location [facility] 3W/SW Attending Luis Antonio Sears Fall risk

## 2019-09-09 ENCOUNTER — APPOINTMENT (OUTPATIENT)
Dept: ELECTROPHYSIOLOGY | Facility: CLINIC | Age: 84
End: 2019-09-09

## 2019-10-25 ENCOUNTER — NON-APPOINTMENT (OUTPATIENT)
Age: 84
End: 2019-10-25

## 2019-10-25 ENCOUNTER — LABORATORY RESULT (OUTPATIENT)
Age: 84
End: 2019-10-25

## 2019-10-25 ENCOUNTER — APPOINTMENT (OUTPATIENT)
Dept: ELECTROPHYSIOLOGY | Facility: CLINIC | Age: 84
End: 2019-10-25
Payer: MEDICARE

## 2019-10-25 VITALS
HEIGHT: 64 IN | BODY MASS INDEX: 17.07 KG/M2 | OXYGEN SATURATION: 97 % | RESPIRATION RATE: 14 BRPM | SYSTOLIC BLOOD PRESSURE: 112 MMHG | WEIGHT: 100 LBS | DIASTOLIC BLOOD PRESSURE: 75 MMHG | HEART RATE: 60 BPM

## 2019-10-25 DIAGNOSIS — E78.5 HYPERLIPIDEMIA, UNSPECIFIED: ICD-10-CM

## 2019-10-25 DIAGNOSIS — I47.2 VENTRICULAR TACHYCARDIA: ICD-10-CM

## 2019-10-25 DIAGNOSIS — I25.3 ANEURYSM OF HEART: ICD-10-CM

## 2019-10-25 DIAGNOSIS — Z92.29 PERSONAL HISTORY OF OTHER DRUG THERAPY: ICD-10-CM

## 2019-10-25 DIAGNOSIS — M19.90 UNSPECIFIED OSTEOARTHRITIS, UNSPECIFIED SITE: ICD-10-CM

## 2019-10-25 PROCEDURE — 93000 ELECTROCARDIOGRAM COMPLETE: CPT

## 2019-10-25 PROCEDURE — 99214 OFFICE O/P EST MOD 30 MIN: CPT

## 2019-10-25 NOTE — REVIEW OF SYSTEMS
[Feeling Fatigued] : feeling fatigued [Dyspnea on exertion] : dyspnea during exertion [Joint Pain] : joint pain [Joint Stiffness] : joint stiffness [Negative] : Heme/Lymph

## 2019-10-26 ENCOUNTER — RX RENEWAL (OUTPATIENT)
Age: 84
End: 2019-10-26

## 2019-10-26 RX ORDER — BISACODYL 5 MG/1
5 TABLET ORAL
Qty: 30 | Refills: 0 | Status: DISCONTINUED | COMMUNITY
Start: 2019-09-07

## 2019-10-26 RX ORDER — QUETIAPINE FUMARATE 50 MG/1
50 TABLET ORAL
Qty: 30 | Refills: 0 | Status: DISCONTINUED | COMMUNITY
Start: 2019-07-16

## 2019-10-26 RX ORDER — OMEPRAZOLE 20 MG/1
20 CAPSULE, DELAYED RELEASE ORAL
Qty: 30 | Refills: 0 | Status: DISCONTINUED | COMMUNITY
Start: 2019-06-09

## 2019-10-26 RX ORDER — ASPIRIN 81 MG/1
81 TABLET, CHEWABLE ORAL
Qty: 36 | Refills: 0 | Status: DISCONTINUED | COMMUNITY
Start: 2019-09-07

## 2019-10-26 RX ORDER — AZELASTINE HYDROCHLORIDE 137 UG/1
0.1 SPRAY, METERED NASAL
Qty: 30 | Refills: 0 | Status: DISCONTINUED | COMMUNITY
Start: 2019-04-25

## 2019-10-26 RX ORDER — LACTULOSE 10 G/15ML
10 SOLUTION ORAL; RECTAL
Qty: 946 | Refills: 0 | Status: DISCONTINUED | COMMUNITY
Start: 2019-07-16

## 2019-10-26 RX ORDER — MELOXICAM 7.5 MG/1
7.5 TABLET ORAL
Qty: 90 | Refills: 0 | Status: DISCONTINUED | COMMUNITY
Start: 2019-05-21

## 2019-10-26 RX ORDER — METOPROLOL SUCCINATE 25 MG/1
25 TABLET, EXTENDED RELEASE ORAL
Qty: 30 | Refills: 0 | Status: DISCONTINUED | COMMUNITY
Start: 2019-07-16

## 2019-10-26 RX ORDER — AMOXICILLIN AND CLAVULANATE POTASSIUM 500; 125 MG/1; MG/1
500-125 TABLET, FILM COATED ORAL
Qty: 10 | Refills: 0 | Status: DISCONTINUED | COMMUNITY
Start: 2019-08-14

## 2019-10-26 RX ORDER — TRAMADOL HYDROCHLORIDE AND ACETAMINOPHEN 37.5; 325 MG/1; MG/1
37.5-325 TABLET, FILM COATED ORAL
Qty: 28 | Refills: 0 | Status: DISCONTINUED | COMMUNITY
Start: 2019-08-12

## 2019-10-26 NOTE — HISTORY OF PRESENT ILLNESS
[FreeTextEntry1] : Ms. ABIGAIL GAYLE returns today for routine follow up visit s/p ICD shocks. 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  She presents today for follow up after shocks one week ago.\par Currently denies SOB at rest, dizzy spells, chest pains , syncope or near syncope.  DTR denies further shocks since then.\par

## 2019-10-26 NOTE — DISCUSSION/SUMMARY
[Patient] : the patient [Ventricular Tachycardia] : ventricular tachycardia [FreeTextEntry4] : ICD shock [FreeTextEntry2] : daughter [FreeTextEntry1] : In summary, Ms. 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 Continue amiodarone 200mg daily and will check Chemistry as well to check electrolytes.  Will proceed with conservative measures given patients advanced age and co-morbid conditions.  Discussed in detail with patients DTR.  Script for increase dose of metoprolol sent to pharmacy.  TSH was high at 9.61, but other thyroid parameters normal (not likely to be causing VT), K was 3.9 and Mg was 2.0. \par \par Sincerely,\par \par Bladimir Gomez MD

## 2019-10-26 NOTE — REASON FOR VISIT
[Follow-Up - Clinic] : a clinic follow-up of [Ventricular Tachycardia] : ventricular tachycardia [FreeTextEntry1] : ICD shocks earlier in October

## 2019-10-26 NOTE — PHYSICAL EXAM
[General Appearance - Well Developed] : well developed [Normal Appearance] : normal appearance [Auscultation Breath Sounds / Voice Sounds] : lungs were clear to auscultation bilaterally [Decreased Breath Sounds] : decreased breath sounds [Heart Rate And Rhythm] : heart rate and rhythm were normal [Heart Sounds] : normal S1 and S2 [Bowel Sounds] : normal bowel sounds [Abnormal Walk] : normal gait [Skin Color & Pigmentation] : normal skin color and pigmentation [Oriented To Time, Place, And Person] : oriented to person, place, and time [Normal Conjunctiva] : the conjunctiva exhibited no abnormalities [Eyelids - No Xanthelasma] : the eyelids demonstrated no xanthelasmas [Normal Oral Mucosa] : normal oral mucosa [No Oral Pallor] : no oral pallor [No Oral Cyanosis] : no oral cyanosis [Normal Jugular Venous A Waves Present] : normal jugular venous A waves present [Normal Jugular Venous V Waves Present] : normal jugular venous V waves present [No Jugular Venous Arguello A Waves] : no jugular venous arguello A waves [Respiration, Rhythm And Depth] : normal respiratory rhythm and effort [Exaggerated Use Of Accessory Muscles For Inspiration] : no accessory muscle use [Nail Clubbing] : no clubbing of the fingernails [Cyanosis, Localized] : no localized cyanosis [Petechial Hemorrhages (___cm)] : no petechial hemorrhages [] : no rash [No Venous Stasis] : no venous stasis [Skin Lesions] : no skin lesions [No Skin Ulcers] : no skin ulcer [No Xanthoma] : no  xanthoma was observed [FreeTextEntry1] : L

## 2019-10-28 LAB
ALBUMIN SERPL ELPH-MCNC: 3.7 G/DL
ALP BLD-CCNC: 80 U/L
ALT SERPL-CCNC: 8 U/L
ANION GAP SERPL CALC-SCNC: 13 MMOL/L
AST SERPL-CCNC: 15 U/L
BILIRUB SERPL-MCNC: 1.4 MG/DL
BUN SERPL-MCNC: 12 MG/DL
CALCIUM SERPL-MCNC: 9.1 MG/DL
CHLORIDE SERPL-SCNC: 103 MMOL/L
CO2 SERPL-SCNC: 28 MMOL/L
CREAT SERPL-MCNC: 0.39 MG/DL
GLUCOSE SERPL-MCNC: 80 MG/DL
MAGNESIUM SERPL-MCNC: 2 MG/DL
POTASSIUM SERPL-SCNC: 3.9 MMOL/L
PROT SERPL-MCNC: 6.6 G/DL
SODIUM SERPL-SCNC: 144 MMOL/L
T3RU NFR SERPL: 0.9 TBI
T4 SERPL-MCNC: 7.6 UG/DL
TSH SERPL-ACNC: 9.61 UIU/ML

## 2019-11-27 ENCOUNTER — APPOINTMENT (OUTPATIENT)
Dept: ELECTROPHYSIOLOGY | Facility: CLINIC | Age: 84
End: 2019-11-27

## 2019-12-10 ENCOUNTER — APPOINTMENT (OUTPATIENT)
Dept: ELECTROPHYSIOLOGY | Facility: CLINIC | Age: 84
End: 2019-12-10

## 2019-12-29 NOTE — ED PROVIDER NOTE - MDM ORDERS SUBMITTED SELECTION
[FreeTextEntry1] : IMPRESSION: Ms. Lawson is a 48 year old woman with a history of HTN, migraine headaches, PFO, and family history of CAD who presents today for follow up of her blood pressure. \par \par PLAN:\par 1. Her blood pressure is acceptable at this time, thus she will continue on the HCTZ 12.5mg every other day and on Propranolol 40mg daily along with a low sodium diet.  \par 2. She will continue on aspirin 81 mg daily as her headaches may be secondary to her PFO. She states that her headaches have been improved since she started taking her ASA on a regular basis. \par 3. She will follow up with me in 3 months for a blood pressure check or sooner should she experience any cardiac symptoms in the interim. EKG/Imaging Studies/Medications/Labs

## 2020-01-07 NOTE — H&P ADULT - MINUTES
Tobacco Use    Smoking status: Passive Smoke Exposure - Never Smoker    Smokeless tobacco: Never Used   Substance and Sexual Activity    Alcohol use: No     Comment: 3 DRINKS A YEAR    Drug use: No    Sexual activity: Not on file   Lifestyle    Physical activity:     Days per week: Not on file     Minutes per session: Not on file    Stress: Not on file   Relationships    Social connections:     Talks on phone: Not on file     Gets together: Not on file     Attends Protestant service: Not on file     Active member of club or organization: Not on file     Attends meetings of clubs or organizations: Not on file     Relationship status: Not on file    Intimate partner violence:     Fear of current or ex partner: Not on file     Emotionally abused: Not on file     Physically abused: Not on file     Forced sexual activity: Not on file   Other Topics Concern    Not on file   Social History Narrative    Not on file         Objective:   Physical Exam  Vitals signs and nursing note reviewed. Constitutional:       Appearance: She is well-developed. Eyes:      Conjunctiva/sclera: Conjunctivae normal.      Pupils: Pupils are equal, round, and reactive to light. Neck:      Musculoskeletal: Normal range of motion. Cardiovascular:      Rate and Rhythm: Normal rate and regular rhythm. Pulses: Normal pulses. Heart sounds: Normal heart sounds. Pulmonary:      Effort: Pulmonary effort is normal.      Breath sounds: Normal breath sounds. Musculoskeletal: Normal range of motion. Skin:     General: Skin is warm and dry. Neurological:      Mental Status: She is alert and oriented to person, place, and time. Psychiatric:         Speech: Speech normal.         Behavior: Behavior normal.         Thought Content: Thought content normal.         Judgment: Judgment normal.       Vitals:    01/07/20 1439   BP: 128/74       Assessment:       Diagnosis Orders   1.  ES[facility] (end stage renal disease) ([facility]
60

## 2020-01-30 NOTE — ED ADULT NURSE NOTE - CINV DISCH TEACH PARTICIP
Patient Fusiform Excision Additional Text (Leave Blank If You Do Not Want): The margin was drawn around the clinically apparent lesion.  A fusiform shape was then drawn on the skin incorporating the lesion and margins.  Incisions were then made along these lines to the appropriate tissue plane and the lesion was extirpated.

## 2020-02-28 NOTE — PHYSICAL THERAPY INITIAL EVALUATION ADULT - LEVEL OF INDEPENDENCE, REHAB EVAL
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    Afrberile,no place in LTAC yet.  Afebrile.       moderate assist (50% patients effort)

## 2020-04-02 NOTE — ED ADULT NURSE NOTE - TEMPLATE LIST FOR HEAD TO TOE ASSESSMENT
PATIENT INFORMATION    Anticipatory guidance discussed  Add one food at a time every 3-5 days to see if tolerated  Adequate diet for breastfeeding  Avoid cow's milk until 12 months of age  Avoid infant walkers  Avoid potential choking hazards (large, spherical, or coin shaped foods) unit  Avoid putting to bed with bottle  Avoid small toys (choking hazard)  Call for decreased feeding, fever  Car seat issues, including proper placement  Consider saving potentially allergenic foods (e.g. fish, egg white, wheat) until last  Encouraged that any formula used be iron-fortified  Fluoride supplementation if unfluoridated water supply  Impossible to \"spoil\" infants at this age  Limiting daytime sleep to 3-4 hours at a time  Make middle-of-night feeds \"brief and boring\"  Most babies sleep through night by 6 months of age  Never leave unattended except in crib  Observe while eating; consider CPR classes  Obtain and know how to use thermometer  Place in crib before completely asleep  Risk of falling once learns to roll  Safe sleep furniture  Set hot water heater less than 120 degrees F  Sleep face up to decrease the chances of SIDS  Smoke detectors  Start solids gradually at 4-6 months    Follow-Up  - Return for your 6 month well child visit.    4 months old Health and Safety Tips - The following hyperlinks are available to access via [employer]    Parent Education from [employer]    Educación para padres sobre niños sanos    Common dosing for acetaminophen:  Acetaminophen (Tylenol) can be given every 4 hours.  · Infant Drops: 160mg/5ml for  Weight 6-11 lbs 12-17 lbs 18-23 lbs 24-35 lbs   Dose 1.25 ml 2.5 ml 3.75 ml 5 ml     Additional Educational Resources:  For additional resources regarding your symptoms, diagnosis, or further health information, please visit the Online Health Resources section in [employer].  
General

## 2020-05-15 NOTE — ED PROVIDER NOTE - GASTROINTESTINAL, MLM
Pt c/o sliver to left hand was at work [employer] and was working with lumbar. Last td yrs ago. Abdomen soft, non-tender, no guarding.

## 2020-08-03 NOTE — DIETITIAN INITIAL EVALUATION ADULT. - SOURCE
+chart review/patient Quality 130: Documentation Of Current Medications In The Medical Record: Current Medications Documented Detail Level: Detailed Quality 226: Preventive Care And Screening: Tobacco Use: Screening And Cessation Intervention: Patient screened for tobacco use and is an ex/non-smoker Quality 431: Preventive Care And Screening: Unhealthy Alcohol Use - Screening: Patient screened for unhealthy alcohol use using a single question and scores less than 2 times per year

## 2020-09-13 NOTE — ED ADULT NURSE NOTE - NS ED NURSE DC INFO COMPLEXITY
You can access the FollowMyHealth Patient Portal offered by [facility] by registering at the following website: [email or web address]. By joining [employer]’s FollowMyHealth portal, you will also be able to view your health information using other applications (apps) compatible with our system. Simple: Patient demonstrates quick and easy understanding/Returned Demonstration/Verbalized Understanding

## 2021-01-04 NOTE — ED ADULT NURSE NOTE - NS ED NURSE IV DC DT
12-Dec-2017 11:32 Tissue Cultured Epidermal Autograft Text: The defect edges were debeveled with a #15 scalpel blade.  Given the location of the defect, shape of the defect and the proximity to free margins a tissue cultured epidermal autograft was deemed most appropriate.  The graft was then trimmed to fit the size of the defect.  The graft was then placed in the primary defect and oriented appropriately.

## 2021-10-25 NOTE — PROGRESS NOTE ADULT - PROBLEM SELECTOR PLAN 3
cont med Pt is a 51 y/o M with PMH of Diabetes (on insulin), Htn (says not on meds, but pharmacy states amlodipine, and carvedilol), obesity, PSH of Left foot 5th digit amputation, who presented to the ED with complaints of chronic left heel ulcer for 2-3 months. Patient states it has not been healing well. He has been seeing a podiatrist Dr. Ruffin, who sent him to the hospital for further evaluation. Pt says that he has sometimes seen some purple discharge from the wound, he denies any fevers or chills. He also denies any limited range of motion, difficulty with ambulation, pain at the site, or at the ankle joint. He denies any other complaints.  Pt is a 51 y/o M with PMH of Diabetes (on insulin), Htn (says not on meds, but pharmacy states amlodipine, and carvedilol), anemia, obesity, PSH of Left foot 5th digit amputation, who presented to the ED with complaints of chronic left heel ulcer for 2-3 months. Patient states it has not been healing well. He has been seeing a podiatrist Dr. Ruffin, who sent him to the hospital for further evaluation. Pt says that he has sometimes seen some purple discharge from the wound, he denies any fevers or chills. He also denies any limited range of motion, difficulty with ambulation, pain at the site, or at the ankle joint. He denies any other complaints.     Upon further questioning after lab review. Patient endorses that his PCP told him to see a nephrologist for his kidney but he hadn't had the opportunity to go. He also knew about anemia and endorses some episodes of bleeding both painless BRBPR and black tarry stools around 8 months ago. Has never had a colonoscopy or endoscopy. Patient denies ever having a echocardiogram, or being told he has heart failure.  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 Patient states it has not been healing well. He has been seeing a podiatrist Dr. Ruffin, who sent him to the hospital for further evaluation. Pt says that he has sometimes seen some purple discharge from the wound, he denies any fevers or chills. He also denies any limited range of motion, difficulty with ambulation, pain at the site, or at the ankle joint. He denies any other complaints.     Upon further questioning after lab review. Patient endorses that his PCP told him to see a nephrologist for his kidney but he hadn't had the opportunity to go. He also knew about anemia and endorses some episodes of bleeding both painless BRBPR and black tarry stools around 8 months ago. Has never had a colonoscopy or endoscopy. Patient denies ever having a echocardiogram, or being told he has heart failure.

## 2021-11-10 NOTE — PATIENT PROFILE ADULT - NSPROCHRONICPAIN_GEN_A_NUR
no Azithromycin Counseling:  I discussed with the patient the risks of azithromycin including but not limited to GI upset, allergic reaction, drug rash, diarrhea, and yeast infections.

## 2022-01-17 NOTE — ED PROVIDER NOTE - NS ED NOTE AC HIGH RISK COUNTRIES
1/17/2022    HPI:  Chief complaint and history of present illness as per medical assistant/nurse documented today in the Flu/COVID-19 clinic. Fani Negrete is a 25year old male who is in with 2 day complaint of flu like symptoms. He states he has had fevers, chills, cough, headache, sore throat muscle aches, nausea and fatigue. He states his symptoms are stable. He states he was exposed by family who are positive with Covid. He is not immunized. MEDICATIONS:  Prior to Visit Medications    Not on File           PHYSICAL EXAM:  Physical Exam  Vitals reviewed. Constitutional:       General: He is not in acute distress. Appearance: Normal appearance. He is well-developed. He is not ill-appearing or diaphoretic. HENT:      Head: Normocephalic and atraumatic. Right Ear: Tympanic membrane, ear canal and external ear normal.      Left Ear: Tympanic membrane, ear canal and external ear normal.      Nose: Nose normal. No congestion or rhinorrhea. Mouth/Throat:      Mouth: Mucous membranes are moist.      Pharynx: Oropharynx is clear. No oropharyngeal exudate or posterior oropharyngeal erythema. Eyes:      General: No scleral icterus. Conjunctiva/sclera: Conjunctivae normal.      Pupils: Pupils are equal, round, and reactive to light. Cardiovascular:      Rate and Rhythm: Normal rate and regular rhythm. Heart sounds: Normal heart sounds. No murmur heard. No friction rub. No gallop. Pulmonary:      Effort: Pulmonary effort is normal. No respiratory distress. Breath sounds: Normal breath sounds. No wheezing. Chest:      Chest wall: No tenderness. Abdominal:      Palpations: Abdomen is soft. Musculoskeletal:         General: Normal range of motion. Cervical back: Normal range of motion and neck supple. No rigidity or tenderness. Lymphadenopathy:      Cervical: No cervical adenopathy. Skin:     General: Skin is warm and dry.    Neurological:      Mental Status: He is alert and oriented to person, place, and time. Psychiatric:         Behavior: Behavior normal.         Thought Content: Thought content normal.         Judgment: Judgment normal.         ASSESSMENT/PLAN:  1. Cough  - Covid-19 Ambulatory    2. Flu-like symptoms    Fluids, rest  Will call results of test  Saline nasal spray for congestion  Warm salt gargles for throat irritation  Seek ED care if symptoms worsen or with PCP  Verbalized understanding and agreement with plan      FOLLOW-UP:  No follow-ups on file.     In addition to other information, the printed after visit summary provided to the patient includes:  [] COVID-19 Self care instructions  [] COVID-19 General patient information No

## 2022-06-17 NOTE — ED ADULT NURSE NOTE - SEVERITY
PAIN SCALE 10 OF 10. O-T Plasty Text: The defect edges were debeveled with a #15 scalpel blade.  Given the location of the defect, shape of the defect and the proximity to free margins an O-T plasty was deemed most appropriate.  Using a sterile surgical marker, an appropriate O-T plasty was drawn incorporating the defect and placing the expected incisions within the relaxed skin tension lines where possible.    The area thus outlined was incised deep to adipose tissue with a #15 scalpel blade.  The skin margins were undermined to an appropriate distance in all directions utilizing iris scissors.

## 2022-11-10 NOTE — ED ADULT NURSE NOTE - NSFALLRSKPASTHIST_ED_ALL_ED
Patient ambulated well without assistance, patient made aware of f/u and return precautions reviewed. Ambulated well without assistance. yes

## 2023-03-03 NOTE — DISCHARGE NOTE ADULT - ADMISSION DATE +STARTOFVISITDATE
Statement Selected posterior cervical L/posterior cervical R/anterior cervical L/anterior cervical R

## 2023-03-15 NOTE — ED ADULT NURSE NOTE - CHPI ED NUR AGGRAVATING FX
[facility]eg[facility] Approved    Filling Pharmacy: [facility]  Additional Information: Pharmacy contacted - received paid claim for $24.98 copay. Pharmacy will contact patient when medication is ready to be picked up.           bowel movement

## 2023-05-03 NOTE — ED ADULT TRIAGE NOTE - NS ED NURSE DIRECT TO ROOM YN
05/03/23 0800   Pain Assessment   Pain Assessment Tool 0-10   Pain Score No Pain   Restrictions/Precautions   Precautions Aspiration;Cognitive; Fall Risk;Supervision on toilet/commode   Weight Bearing Restrictions No   ROM Restrictions No   Cognition   Overall Cognitive Status Impaired   Subjective   Subjective Patient ready to particiapte in PT session   Sit to Stand   Type of Assistance Needed Independent   Physical Assistance Level No physical assistance   Sit to Stand CARE Score 6   Bed-Chair Transfer   Type of Assistance Needed Independent   Physical Assistance Level No physical assistance   Chair/Bed-to-Chair Transfer CARE Score 6   Walk 10 Feet   Type of Assistance Needed Independent   Physical Assistance Level No physical assistance   Walk 10 Feet CARE Score 6   Walk 50 Feet with Two Turns   Type of Assistance Needed Independent   Physical Assistance Level No physical assistance   Walk 50 Feet with Two Turns CARE Score 6   Walk 150 Feet   Type of Assistance Needed Independent   Physical Assistance Level No physical assistance   Walk 150 Feet CARE Score 6   Ambulation   Primary Mode of Locomotion Prior to Admission Walk   Distance Walked (feet) 16 ft  (+ 400)   Gait Pattern Lateral deviation   Findings (S)  Patient is independent with all distances of mobility  When ambulating increased distance, he may need S as this would likely be in the community  S for safety and communication barriers   Does the patient walk? 2  Yes   Toilet Transfer   Type of Assistance Needed Independent   Physical Assistance Level No physical assistance   Toilet Transfer CARE Score 6   Therapeutic Interventions   Strengthening PT devised HEP  Performed 1 set of 10 BLE each standing exercise  Therapy later in day to perform supine therex   SEE EXERCISES BELOW   Other (S)  L4 x15 minutes BUE/LE   Assessment   Treatment Assessment PT treatment session focused LE strengthening via use of NuStep and strengthening/balance via review of HEP  Patient able to look at pictures of each exercise and replicate without verbal interference from PT  Will be provided with HEP in handout form when completed  Continued PT intervention recommended as an OP for neuro re-ed/dual tasking  Recommendation   PT Discharge Recommendation Home with outpatient rehabilitation   PT Therapy Minutes   PT Time In 0800   PT Time Out 0900   PT Total Time (minutes) 60   PT Mode of treatment - Individual (minutes) 60   PT Mode of treatment - Concurrent (minutes) 0   PT Mode of treatment - Group (minutes) 0   PT Mode of treatment - Co-treat (minutes) 0   PT Mode of Treatment - Total time(minutes) 60 minutes   PT Cumulative Minutes 990         Access Code: [email or web address]  URL: [email or web address]  Date: 05/03/2023  Prepared by: Cristal Toney    Exercises  - Supine Bridge  - 4 sets - 10 reps  - Supine Bridge with Resistance Band  - 4 sets - 10 reps  - Supine Bridge with Mini Swiss Ball Between Knees  - 4 sets - 10 reps  - Marching Bridge  - 4 sets - 10 reps  - Single Leg Bridge  - 4 sets - 10 reps  - Clamshell  - 4 sets - 10 reps  - Clamshell with Resistance  - 4 sets - 10 reps  - Bird Dog on Counter  - 4 sets - 10 reps  - Heel Raises with Counter Support  - 3 sets - 10 reps  - Standing Hip Abduction with Counter Support  - 3 sets - 10 reps  - Standing March with Counter Support  - 4 sets - 10 reps  - Standing Hip Extension with Counter Support  - 4 sets - 10 reps  - Side Stepping with Counter Support  - 4 sets - 10 reps  - Mini Squat with Counter Support  - 4 sets - 10 reps  - Standing Tandem Balance with Counter Support  - 4 sets - 10 reps  - Standing Single Leg Stance with Counter Support  - 4 sets - 10 reps  - Lunge with Counter Support  - 3 sets - 10 reps  - Side Lunge with Counter Support  - 3 sets - 10 reps  - Sit to Stand with Arm Swing  - 4 sets - 10 reps    Hermelinda Hwang PT, DPT No

## 2024-04-04 NOTE — ED ADULT NURSE NOTE - NS ED NURSE LEVEL OF CONSCIOUSNESS AFFECT
Individual Psychotherapy (PhD/LCSW)    4/4/2024    The patient location is: [place], [place]  The chief complaint leading to consultation is: Anxiety, Sleep, Chronic Pain    Visit type: audiovisual    Face to Face time with patient: 56  60 minutes of total time spent on the encounter, which includes face to face time and non-face to face time preparing to see the patient (eg, review of tests), Obtaining and/or reviewing separately obtained history, Documenting clinical information in the electronic or other health record, Independently interpreting results (not separately reported) and communicating results to the patient/family/caregiver, or Care coordination (not separately reported).     Each patient to whom he or she provides medical services by telemedicine is:  (1) informed of the relationship between the physician and patient and the respective role of any other health care provider with respect to management of the patient; and (2) notified that he or she may decline to receive medical services by telemedicine and may withdraw from such care at any time.    Site:  [facility]         Therapeutic Intervention: Met with patient.  Outpatient - Insight oriented psychotherapy 60 min - CPT code 49041 and Outpatient - Supportive psychotherapy 60 min - CPT Code 47231    Chief complaint/reason for encounter: depression, anxiety, and sleep     Interval history and content of current session: LCSW and patient completed follow up psychotherapy session this date, virtually. He presents alert, oriented x3, with a pleasant affect. Had his children and siblings over for the Easter holiday and did a crawfish boil. Although his back pain remains present, he utilizes his sons who help him with heavy lifting. Ongoing discussion of how to adapt processes in order to not exacerbate his back pain. He spends a majority of his time with his siblings, nieces and nephews, and children.     Patient is working to incorporate more  structured exercise and would like to go to the gym at his apartment complex. They opened the pool this week and he is looking forward to it warming up so he can begin swimming. Endorses feelings of anxiety -  restlessness, fatigued,  muscle tightness, feeling constantly on edge, concentration difficulties, and a general state of irritability that all significantly impacts pt's daily life and functioning. Explored pt's anxiety and utilized anxiety reduction techniques in order to identify pt's realistic verus unrealistic thought processes.       Goals discussed and reviewed. Pt goals are to reduce negative automatic thoughts, reduce physical symptoms of anxiety, reduce time spent worrying (<30 min/per day) , acquire awareness towards stress/anxiety, develop adaptive choices for problem solving and coping, and acquire relapse prevention skills.  Psychotherapy interventions include supportive, Interactive, self-oriented, and behavior modification therapies.  Pt receptive to psychotherapy. Wishes to return in 2 weeks     Treatment plan:  Target symptoms: lack of focus, recurrent depression, anxiety   Why chosen therapy is appropriate versus another modality: relevant to diagnosis, patient responds to this modality, evidence based practice  Outcome monitoring methods: self-report, observation, checklist/rating scale  Therapeutic intervention type: insight oriented psychotherapy, supportive psychotherapy    Risk parameters:  Patient reports no suicidal ideation  Patient reports no homicidal ideation  Patient reports no self-injurious behavior  Patient reports no violent behavior    Verbal deficits: None    Patient's response to intervention:  The patient's response to intervention is accepting, motivated.    Progress toward goals and other mental status changes:  The patient's progress toward goals is good.    Diagnosis:     ICD-10-CM ICD-9-CM   1. Adjustment disorder with mixed anxiety and depressed mood  F43.23 309.28      Plan:  individual psychotherapy    Return to clinic: 2 weeks    Length of Service (minutes): 56     Calm

## 2024-09-05 NOTE — ED ADULT TRIAGE NOTE - NS ED TRIAGE AVPU SCALE
89 Alert-The patient is alert, awake and responds to voice. The patient is oriented to time, place, and person. The triage nurse is able to obtain subjective information.

## 2024-11-18 NOTE — ED ADULT NURSE NOTE - GENITOURINARY ASSESSMENT
[de-identified] : Incisions are healing well.  There is no evidence of any wound dehiscence.  No expressed drainage or erythema present.  Soft tissue swelling present.  Neurovascular intact.
WDL

## 2024-11-30 NOTE — CONSULT NOTE ADULT - CONSULT REASON
Interval History: shortness of breath remarkably improved.    Review of Systems   Constitutional: Positive for malaise/fatigue. Negative for diaphoresis and weight gain.   HENT:  Negative for hoarse voice.    Eyes:  Negative for double vision and visual disturbance.   Cardiovascular:  Negative for chest pain, claudication, cyanosis, dyspnea on exertion, irregular heartbeat, leg swelling, near-syncope, orthopnea, palpitations, paroxysmal nocturnal dyspnea and syncope.   Respiratory:  Positive for shortness of breath (improved). Negative for cough, hemoptysis and snoring.    Hematologic/Lymphatic: Negative for bleeding problem. Does not bruise/bleed easily.   Skin:  Negative for color change and poor wound healing.   Musculoskeletal:  Negative for muscle cramps, muscle weakness and myalgias.   Gastrointestinal:  Negative for bloating, abdominal pain, change in bowel habit, diarrhea, heartburn, hematemesis, hematochezia, melena and nausea.   Neurological:  Negative for excessive daytime sleepiness, dizziness, headaches, light-headedness, loss of balance, numbness and weakness.   Psychiatric/Behavioral:  Negative for memory loss. The patient does not have insomnia.    Allergic/Immunologic: Negative for hives.     Objective:     Vital Signs (Most Recent):  Temp: 97.3 °F (36.3 °C) (11/30/24 0740)  Pulse: 64 (11/30/24 0938)  Resp: 18 (11/30/24 0847)  BP: (!) 189/90 (11/30/24 0938)  SpO2: 96 % (11/30/24 0847) Vital Signs (24h Range):  Temp:  [97.3 °F (36.3 °C)-98.4 °F (36.9 °C)] 97.3 °F (36.3 °C)  Pulse:  [57-76] 64  Resp:  [16-20] 18  SpO2:  [91 %-96 %] 96 %  BP: (133-189)/(60-90) 189/90     Weight: 84.4 kg (186 lb 1.1 oz)  Body mass index is 32.96 kg/m².     SpO2: 96 %         Intake/Output Summary (Last 24 hours) at 11/30/2024 1145  Last data filed at 11/30/2024 1102  Gross per 24 hour   Intake 240 ml   Output 3000 ml   Net -2760 ml       Lines/Drains/Airways       Drain  Duration             Female External Urinary  Catheter w/ Suction 11/29/24 2224 <1 day              Peripheral Intravenous Line  Duration                  Peripheral IV - Single Lumen 11/29/24 0630 20 G Posterior;Right Hand 1 day         Peripheral IV - Single Lumen 11/29/24 0742 20 G Right Antecubital 1 day                       Physical Exam  Vitals and nursing note reviewed.   Constitutional:       General: She is not in acute distress.     Appearance: Normal appearance. She is well-developed. She is not ill-appearing.   HENT:      Head: Normocephalic and atraumatic.   Eyes:      General: No scleral icterus.     Pupils: Pupils are equal, round, and reactive to light.   Neck:      Thyroid: No thyromegaly.      Vascular: Normal carotid pulses. No carotid bruit, hepatojugular reflux or JVD.      Trachea: No tracheal deviation.   Cardiovascular:      Rate and Rhythm: Normal rate and regular rhythm.      Pulses: Normal pulses.      Heart sounds: Murmur heard.      Harsh midsystolic murmur is present with a grade of 2/6 at the upper right sternal border radiating to the neck.      High-pitched blowing decrescendo early diastolic murmur is present with a grade of 1/4 at the upper right sternal border radiating to the apex.      No friction rub. No gallop.   Pulmonary:      Effort: Pulmonary effort is normal. No respiratory distress.      Breath sounds: Rales present. No wheezing or rhonchi.      Comments: Left breast surgically absent  Chest:      Chest wall: No tenderness.   Abdominal:      General: Bowel sounds are normal. There is no abdominal bruit.      Palpations: Abdomen is soft. There is no hepatomegaly or pulsatile mass.      Tenderness: There is no abdominal tenderness.   Musculoskeletal:      Right shoulder: No deformity.      Cervical back: Normal range of motion and neck supple.      Right lower leg: No edema.      Left lower leg: No edema.   Skin:     General: Skin is warm and dry.      Findings: No erythema or rash.      Nails: There is no clubbing.    Neurological:      General: No focal deficit present.      Mental Status: She is alert and oriented to person, place, and time.      Cranial Nerves: No cranial nerve deficit.      Coordination: Coordination normal.   Psychiatric:         Mood and Affect: Mood normal.         Speech: Speech normal.         Behavior: Behavior normal.            Significant Labs:   Recent Lab Results         11/30/24  0447   11/29/24  2104   11/29/24  1447        Anion Gap 12           PTT 48.4  Comment: Refer to local heparin nomogram for intensity/dose specific   therapeutic   range.     45.2  Comment: Refer to local heparin nomogram for intensity/dose specific   therapeutic   range.     47.0  Comment: Refer to local heparin nomogram for intensity/dose specific   therapeutic   range.         Baso # 0.01           Basophil % 0.1           BUN 43           Calcium 9.8           Chloride 103           CO2 25           Creatinine 2.0           Differential Method Automated           eGFR 25           Eos # 0.0           Eos % 0.0           Glucose 150           Gran # (ANC) 7.2           Gran % 87.1           Hematocrit 27.3           Hemoglobin 9.1           Immature Grans (Abs) 0.03  Comment: Mild elevation in immature granulocytes is non specific and   can be seen in a variety of conditions including stress response,   acute inflammation, trauma and pregnancy. Correlation with other   laboratory and clinical findings is essential.             Immature Granulocytes 0.4           Lymph # 0.4           Lymph % 4.5           Magnesium  2.0           MCH 34.9           MCHC 33.3                      Mono # 0.7           Mono % 7.9           MPV 11.5           nRBC 0           Platelet Count 239           Potassium 3.8           RBC 2.61           RDW 15.8           Sodium 140           Troponin I 1.635  Comment: The reference interval for Troponin I represents the 99th percentile   cutoff   for our facility and is consistent with  3rd generation assay   performance.     2.224  Comment: The reference interval for Troponin I represents the 99th percentile   cutoff   for our facility and is consistent with 3rd generation assay   performance.           WBC 8.25                   Significant Imaging: Narrative & Impression  EXAMINATION:  XR CHEST AP PORTABLE     CLINICAL HISTORY:  Asthma;.     TECHNIQUE:  Single frontal portable view of the chest was performed.     COMPARISON:  None     FINDINGS:  Moderate to severe cardiomegaly.     Pulmonary vascular congestion with interstitial edema suspicious for heart failure.  Slightly more confluent opacification right perihilar region raising question of superimposed infection/pneumonia.  No pleural effusion or pneumothorax.     Impression:     As above.        Electronically signed by:Sunil Harvey  Date:                                            11/29/2024  Time:                                           07:35   afib/chest pain

## 2025-05-14 NOTE — ED PROVIDER NOTE - MUSCULOSKELETAL MINIMAL EXAM
Kenneth Ray called to request a refill on his medication.      Last office visit : 3/25/2025   Next office visit : Visit date not found     Requested Prescriptions     Signed Prescriptions Disp Refills    desvenlafaxine succinate (PRISTIQ) 25 MG TB24 extended release tablet 14 tablet 0     Sig: Take 1 tablet by mouth daily **PT NEEDS APPT**     Authorizing Provider: JAMMIE MILLARD     Ordering User: DUKE DOVER LPN  
Pt complains of diffuse pain along back.

## 2025-06-04 NOTE — DISCHARGE NOTE ADULT - FUNCTIONAL SCREEN CURRENT LEVEL: AMBULATION, MLM
[Dear  ___] : Dear  [unfilled], [Consult Letter:] : I had the pleasure of evaluating your patient, [unfilled]. [Please see my note below.] : Please see my note below. [Consult Closing:] : Thank you very much for allowing me to participate in the care of this patient.  If you have any questions, please do not hesitate to contact me. [Sincerely,] : Sincerely, [FreeTextEntry3] : Dr. OSWALD Little [DrLali  ___] : Dr. EAST (2) assistive person